# Patient Record
Sex: FEMALE | Race: WHITE | NOT HISPANIC OR LATINO | URBAN - METROPOLITAN AREA
[De-identification: names, ages, dates, MRNs, and addresses within clinical notes are randomized per-mention and may not be internally consistent; named-entity substitution may affect disease eponyms.]

---

## 2019-02-05 VITALS
TEMPERATURE: 98 F | DIASTOLIC BLOOD PRESSURE: 79 MMHG | RESPIRATION RATE: 20 BRPM | WEIGHT: 149.91 LBS | HEART RATE: 94 BPM | OXYGEN SATURATION: 95 % | SYSTOLIC BLOOD PRESSURE: 120 MMHG

## 2019-02-05 RX ORDER — SODIUM CHLORIDE 9 MG/ML
1000 INJECTION INTRAMUSCULAR; INTRAVENOUS; SUBCUTANEOUS ONCE
Qty: 0 | Refills: 0 | Status: COMPLETED | OUTPATIENT
Start: 2019-02-05 | End: 2019-02-05

## 2019-02-05 NOTE — ED PROVIDER NOTE - PROGRESS NOTE DETAILS
Pt perked up with IVfs, now talking and more strength noted on exam. CT head neg for ICH. UA with +infection. CXR with no obvious infiltrate. Pt was covered with broad spectrum abx.  She denies CP/SOB, no abdominal pain. Per daughter, patient has no complaints at this time. She is resting comfortably in bed. No resp distress.

## 2019-02-05 NOTE — ED ADULT TRIAGE NOTE - CHIEF COMPLAINT QUOTE
pt BIBA from [facility] for eval of abnormal labs drawn on 2/4/19 noted WBC's 16 and keppra level 8. Per nursing home staff pt has been more lethargic refusing food and medications x 24hrs
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
## 2019-02-05 NOTE — ED PROVIDER NOTE - MEDICAL DECISION MAKING DETAILS
Pt febrile with ams and abnormal labs, very weak and dehydrated appearing on exam, decreased strength throughout, septic workup initiated, will also repeat CT head given recent trauma to r/o ICH. Pt will require admission

## 2019-02-05 NOTE — ED PROVIDER NOTE - PHYSICAL EXAMINATION
GEN: Elderly, febrile, awake, alert, responsive to voice, appears very weak.  ENT: Airway patent, Nasal mucosa clear. Mouth with dry mucosa.  EYES: Clear bilaterally. perrl, eomi  RESPIRATORY: Breathing comfortably with normal RR. No hypoxia, no resp distress.  CARDIAC: Regular rate and rhythm  ABDOMEN: Soft, nontender, +bowel sounds, no rebound, rigidity, or guarding.  MSK: Range of motion is not limited, no deformities noted.  NEURO: Awake and responsive to voice, able to follow some commands, overall strength decreased, but able to move hands and feet on command.   SKIN: Skin normal color for race, warm, dry and intact. Resolving ecchymosis to right forehead. GEN: Elderly, febrile, awake, alert, responsive to voice, appears very weak.  ENT: Airway patent, Nasal mucosa clear. Mouth with dry mucosa.  EYES: Clear bilaterally. perrl, eomi  RESPIRATORY: Breathing comfortably with normal RR. No hypoxia, no resp distress.  CARDIAC: Regular rate and rhythm  ABDOMEN: Soft, nontender, +bowel sounds, no rebound, rigidity, or guarding.  MSK: Range of motion is not limited, no deformities noted. No pain with ROM of extremities, no midline c-spine TTP, step offs or deformities.   NEURO: Awake and responsive to voice, able to follow some commands, overall strength decreased, but able to move hands and feet on command.    SKIN: Skin normal color for race, warm, dry and intact. Resolving ecchymosis to right forehead.

## 2019-02-05 NOTE — ED PROVIDER NOTE - CARE PLAN
Principal Discharge DX:	Fever Principal Discharge DX:	Fever  Secondary Diagnosis:	UTI (urinary tract infection)  Secondary Diagnosis:	Dehydration

## 2019-02-06 ENCOUNTER — INPATIENT (INPATIENT)
Facility: HOSPITAL | Age: 74
LOS: 4 days | Discharge: NURSING, REHAB OR LONG-TERM CARE | DRG: 872 | End: 2019-02-11
Attending: INTERNAL MEDICINE | Admitting: INTERNAL MEDICINE
Payer: MEDICARE

## 2019-02-06 DIAGNOSIS — N17.9 ACUTE KIDNEY FAILURE, UNSPECIFIED: ICD-10-CM

## 2019-02-06 DIAGNOSIS — I10 ESSENTIAL (PRIMARY) HYPERTENSION: ICD-10-CM

## 2019-02-06 DIAGNOSIS — Z29.9 ENCOUNTER FOR PROPHYLACTIC MEASURES, UNSPECIFIED: ICD-10-CM

## 2019-02-06 DIAGNOSIS — E78.5 HYPERLIPIDEMIA, UNSPECIFIED: ICD-10-CM

## 2019-02-06 DIAGNOSIS — I25.10 ATHEROSCLEROTIC HEART DISEASE OF NATIVE CORONARY ARTERY WITHOUT ANGINA PECTORIS: ICD-10-CM

## 2019-02-06 DIAGNOSIS — Z91.89 OTHER SPECIFIED PERSONAL RISK FACTORS, NOT ELSEWHERE CLASSIFIED: ICD-10-CM

## 2019-02-06 DIAGNOSIS — N39.0 URINARY TRACT INFECTION, SITE NOT SPECIFIED: ICD-10-CM

## 2019-02-06 DIAGNOSIS — R56.9 UNSPECIFIED CONVULSIONS: ICD-10-CM

## 2019-02-06 DIAGNOSIS — A41.9 SEPSIS, UNSPECIFIED ORGANISM: ICD-10-CM

## 2019-02-06 DIAGNOSIS — D64.9 ANEMIA, UNSPECIFIED: ICD-10-CM

## 2019-02-06 DIAGNOSIS — Z98.890 OTHER SPECIFIED POSTPROCEDURAL STATES: Chronic | ICD-10-CM

## 2019-02-06 DIAGNOSIS — Z90.3 ACQUIRED ABSENCE OF STOMACH [PART OF]: Chronic | ICD-10-CM

## 2019-02-06 LAB
ANION GAP SERPL CALC-SCNC: 12 MMOL/L — SIGNIFICANT CHANGE UP (ref 5–17)
BLD GP AB SCN SERPL QL: NEGATIVE — SIGNIFICANT CHANGE UP
BUN SERPL-MCNC: 25 MG/DL — HIGH (ref 7–23)
CALCIUM SERPL-MCNC: 9.1 MG/DL — SIGNIFICANT CHANGE UP (ref 8.4–10.5)
CHLORIDE SERPL-SCNC: 106 MMOL/L — SIGNIFICANT CHANGE UP (ref 96–108)
CO2 SERPL-SCNC: 22 MMOL/L — SIGNIFICANT CHANGE UP (ref 22–31)
CREAT SERPL-MCNC: 1.54 MG/DL — HIGH (ref 0.5–1.3)
FERRITIN SERPL-MCNC: 292 NG/ML — HIGH (ref 15–150)
GLUCOSE SERPL-MCNC: 110 MG/DL — HIGH (ref 70–99)
HCT VFR BLD CALC: 23.7 % — LOW (ref 34.5–45)
HGB BLD-MCNC: 7.2 G/DL — LOW (ref 11.5–15.5)
IRON SATN MFR SERPL: 13 UG/DL — LOW (ref 30–160)
IRON SATN MFR SERPL: 6 % — LOW (ref 14–50)
MAGNESIUM SERPL-MCNC: 2.1 MG/DL — SIGNIFICANT CHANGE UP (ref 1.6–2.6)
MCHC RBC-ENTMCNC: 30 PG — SIGNIFICANT CHANGE UP (ref 27–34)
MCHC RBC-ENTMCNC: 30.4 GM/DL — LOW (ref 32–36)
MCV RBC AUTO: 98.8 FL — SIGNIFICANT CHANGE UP (ref 80–100)
NRBC # BLD: 0 /100 WBCS — SIGNIFICANT CHANGE UP (ref 0–0)
PLATELET # BLD AUTO: 439 K/UL — HIGH (ref 150–400)
POTASSIUM SERPL-MCNC: 4.3 MMOL/L — SIGNIFICANT CHANGE UP (ref 3.5–5.3)
POTASSIUM SERPL-SCNC: 4.3 MMOL/L — SIGNIFICANT CHANGE UP (ref 3.5–5.3)
RAPID RVP RESULT: SIGNIFICANT CHANGE UP
RBC # BLD: 2.4 M/UL — LOW (ref 3.8–5.2)
RBC # FLD: 15.4 % — HIGH (ref 10.3–14.5)
RH IG SCN BLD-IMP: POSITIVE — SIGNIFICANT CHANGE UP
RH IG SCN BLD-IMP: POSITIVE — SIGNIFICANT CHANGE UP
SODIUM SERPL-SCNC: 140 MMOL/L — SIGNIFICANT CHANGE UP (ref 135–145)
TIBC SERPL-MCNC: 215 UG/DL — LOW (ref 220–430)
TRANSFERRIN SERPL-MCNC: 177 MG/DL — LOW (ref 200–360)
UIBC SERPL-MCNC: 202 UG/DL — SIGNIFICANT CHANGE UP (ref 110–370)
WBC # BLD: 12.65 K/UL — HIGH (ref 3.8–10.5)
WBC # FLD AUTO: 12.65 K/UL — HIGH (ref 3.8–10.5)

## 2019-02-06 PROCEDURE — 93010 ELECTROCARDIOGRAM REPORT: CPT

## 2019-02-06 PROCEDURE — 70450 CT HEAD/BRAIN W/O DYE: CPT | Mod: 26

## 2019-02-06 PROCEDURE — 71045 X-RAY EXAM CHEST 1 VIEW: CPT | Mod: 26

## 2019-02-06 PROCEDURE — 99285 EMERGENCY DEPT VISIT HI MDM: CPT | Mod: 25

## 2019-02-06 RX ORDER — LEVETIRACETAM 250 MG/1
1000 TABLET, FILM COATED ORAL ONCE
Qty: 0 | Refills: 0 | Status: COMPLETED | OUTPATIENT
Start: 2019-02-06 | End: 2019-02-06

## 2019-02-06 RX ORDER — PANTOPRAZOLE SODIUM 20 MG/1
40 TABLET, DELAYED RELEASE ORAL
Qty: 0 | Refills: 0 | Status: DISCONTINUED | OUTPATIENT
Start: 2019-02-06 | End: 2019-02-11

## 2019-02-06 RX ORDER — SODIUM CHLORIDE 9 MG/ML
1000 INJECTION INTRAMUSCULAR; INTRAVENOUS; SUBCUTANEOUS ONCE
Qty: 0 | Refills: 0 | Status: COMPLETED | OUTPATIENT
Start: 2019-02-06 | End: 2019-02-06

## 2019-02-06 RX ORDER — LACOSAMIDE 50 MG/1
200 TABLET ORAL
Qty: 0 | Refills: 0 | Status: DISCONTINUED | OUTPATIENT
Start: 2019-02-06 | End: 2019-02-11

## 2019-02-06 RX ORDER — PIPERACILLIN AND TAZOBACTAM 4; .5 G/20ML; G/20ML
3.38 INJECTION, POWDER, LYOPHILIZED, FOR SOLUTION INTRAVENOUS ONCE
Qty: 0 | Refills: 0 | Status: COMPLETED | OUTPATIENT
Start: 2019-02-06 | End: 2019-02-06

## 2019-02-06 RX ORDER — ATORVASTATIN CALCIUM 80 MG/1
10 TABLET, FILM COATED ORAL AT BEDTIME
Qty: 0 | Refills: 0 | Status: DISCONTINUED | OUTPATIENT
Start: 2019-02-06 | End: 2019-02-11

## 2019-02-06 RX ORDER — LEVETIRACETAM 250 MG/1
500 TABLET, FILM COATED ORAL
Qty: 0 | Refills: 0 | Status: DISCONTINUED | OUTPATIENT
Start: 2019-02-06 | End: 2019-02-11

## 2019-02-06 RX ORDER — HALOPERIDOL DECANOATE 100 MG/ML
1 INJECTION INTRAMUSCULAR ONCE
Qty: 0 | Refills: 0 | Status: COMPLETED | OUTPATIENT
Start: 2019-02-06 | End: 2019-02-06

## 2019-02-06 RX ORDER — FERROUS SULFATE 325(65) MG
325 TABLET ORAL DAILY
Qty: 0 | Refills: 0 | Status: DISCONTINUED | OUTPATIENT
Start: 2019-02-06 | End: 2019-02-11

## 2019-02-06 RX ORDER — MEMANTINE HYDROCHLORIDE 10 MG/1
10 TABLET ORAL DAILY
Qty: 0 | Refills: 0 | Status: DISCONTINUED | OUTPATIENT
Start: 2019-02-06 | End: 2019-02-11

## 2019-02-06 RX ORDER — ACETAMINOPHEN 500 MG
1000 TABLET ORAL ONCE
Qty: 0 | Refills: 0 | Status: COMPLETED | OUTPATIENT
Start: 2019-02-06 | End: 2019-02-06

## 2019-02-06 RX ORDER — VANCOMYCIN HCL 1 G
1000 VIAL (EA) INTRAVENOUS ONCE
Qty: 0 | Refills: 0 | Status: COMPLETED | OUTPATIENT
Start: 2019-02-06 | End: 2019-02-06

## 2019-02-06 RX ORDER — DONEPEZIL HYDROCHLORIDE 10 MG/1
5 TABLET, FILM COATED ORAL AT BEDTIME
Qty: 0 | Refills: 0 | Status: DISCONTINUED | OUTPATIENT
Start: 2019-02-06 | End: 2019-02-11

## 2019-02-06 RX ORDER — ASPIRIN/CALCIUM CARB/MAGNESIUM 324 MG
81 TABLET ORAL DAILY
Qty: 0 | Refills: 0 | Status: DISCONTINUED | OUTPATIENT
Start: 2019-02-06 | End: 2019-02-11

## 2019-02-06 RX ORDER — CEFTRIAXONE 500 MG/1
1 INJECTION, POWDER, FOR SOLUTION INTRAMUSCULAR; INTRAVENOUS EVERY 24 HOURS
Qty: 0 | Refills: 0 | Status: DISCONTINUED | OUTPATIENT
Start: 2019-02-06 | End: 2019-02-10

## 2019-02-06 RX ADMIN — LEVETIRACETAM 500 MILLIGRAM(S): 250 TABLET, FILM COATED ORAL at 21:50

## 2019-02-06 RX ADMIN — LACOSAMIDE 200 MILLIGRAM(S): 50 TABLET ORAL at 06:58

## 2019-02-06 RX ADMIN — PIPERACILLIN AND TAZOBACTAM 200 GRAM(S): 4; .5 INJECTION, POWDER, LYOPHILIZED, FOR SOLUTION INTRAVENOUS at 00:45

## 2019-02-06 RX ADMIN — LACOSAMIDE 200 MILLIGRAM(S): 50 TABLET ORAL at 18:48

## 2019-02-06 RX ADMIN — LEVETIRACETAM 400 MILLIGRAM(S): 250 TABLET, FILM COATED ORAL at 01:15

## 2019-02-06 RX ADMIN — DONEPEZIL HYDROCHLORIDE 5 MILLIGRAM(S): 10 TABLET, FILM COATED ORAL at 21:50

## 2019-02-06 RX ADMIN — MEMANTINE HYDROCHLORIDE 10 MILLIGRAM(S): 10 TABLET ORAL at 13:00

## 2019-02-06 RX ADMIN — PANTOPRAZOLE SODIUM 40 MILLIGRAM(S): 20 TABLET, DELAYED RELEASE ORAL at 06:58

## 2019-02-06 RX ADMIN — HALOPERIDOL DECANOATE 1 MILLIGRAM(S): 100 INJECTION INTRAMUSCULAR at 05:31

## 2019-02-06 RX ADMIN — SODIUM CHLORIDE 2000 MILLILITER(S): 9 INJECTION INTRAMUSCULAR; INTRAVENOUS; SUBCUTANEOUS at 01:31

## 2019-02-06 RX ADMIN — CEFTRIAXONE 100 GRAM(S): 500 INJECTION, POWDER, FOR SOLUTION INTRAMUSCULAR; INTRAVENOUS at 12:59

## 2019-02-06 RX ADMIN — Medication 250 MILLIGRAM(S): at 01:31

## 2019-02-06 RX ADMIN — LEVETIRACETAM 500 MILLIGRAM(S): 250 TABLET, FILM COATED ORAL at 13:01

## 2019-02-06 RX ADMIN — SODIUM CHLORIDE 1000 MILLILITER(S): 9 INJECTION INTRAMUSCULAR; INTRAVENOUS; SUBCUTANEOUS at 00:13

## 2019-02-06 RX ADMIN — ATORVASTATIN CALCIUM 10 MILLIGRAM(S): 80 TABLET, FILM COATED ORAL at 21:50

## 2019-02-06 RX ADMIN — Medication 325 MILLIGRAM(S): at 13:00

## 2019-02-06 RX ADMIN — Medication 81 MILLIGRAM(S): at 13:00

## 2019-02-06 RX ADMIN — Medication 400 MILLIGRAM(S): at 00:20

## 2019-02-06 NOTE — H&P ADULT - ASSESSMENT
74 y/o F PMHx CAD s/p stents (>10 years ago), HTN, HLD, anemia, seizure disorder on Keppra, dementia (waxes and wanes) presenting from [facility] with increased weakness, lethargy for the last few days, admitted for sepsis 2/2 UTI.

## 2019-02-06 NOTE — H&P ADULT - NSHPPHYSICALEXAM_GEN_ALL_CORE
.  VITAL SIGNS:  T(C): 36.8 (02-06-19 @ 02:11), Max: 38.4 (02-06-19 @ 00:00)  T(F): 98.3 (02-06-19 @ 02:11), Max: 101.2 (02-06-19 @ 00:00)  HR: 80 (02-06-19 @ 02:11) (80 - 94)  BP: 95/- (02-06-19 @ 02:11) (95/- - 120/79)  BP(mean): --  RR: 20 (02-05-19 @ 23:38) (20 - 20)  SpO2: 95% (02-05-19 @ 23:38) (95% - 95%)  Wt(kg): --    PHYSICAL EXAM:    Constitutional: WDWN resting comfortably in bed; NAD  Head: NC/AT  Eyes: PERRL, EOMI, anicteric sclera  ENT: no nasal discharge; uvula midline, no oropharyngeal erythema or exudates; MMM  Neck: supple; no JVD or thyromegaly  Respiratory: CTA B/L; no W/R/R, no retractions  Cardiac: +S1/S2; RRR; no M/R/G; PMI non-displaced  Gastrointestinal: abdomen soft, NT/ND; no rebound or guarding; +BSx4  Genitourinary: normal external genitalia  Back: spine midline, no bony tenderness or step-offs; no CVAT B/L  Extremities: WWP, no clubbing or cyanosis; no peripheral edema  Musculoskeletal: NROM x4; no joint swelling, tenderness or erythema  Vascular: 2+ radial, femoral, DP/PT pulses B/L  Dermatologic: skin warm, dry and intact; no rashes, wounds, or scars  Lymphatic: no submandibular or cervical LAD  Neurologic: AAOx3; CNII-XII grossly intact; no focal deficits  Psychiatric: affect and characteristics of appearance, verbalizations, behaviors are appropriate .  VITAL SIGNS:  T(C): 36.8 (02-06-19 @ 02:11), Max: 38.4 (02-06-19 @ 00:00)  T(F): 98.3 (02-06-19 @ 02:11), Max: 101.2 (02-06-19 @ 00:00)  HR: 80 (02-06-19 @ 02:11) (80 - 94)  BP: 95/- (02-06-19 @ 02:11) (95/- - 120/79)  BP(mean): --  RR: 20 (02-05-19 @ 23:38) (20 - 20)  SpO2: 95% (02-05-19 @ 23:38) (95% - 95%)  Wt(kg): --    PHYSICAL EXAM:    Constitutional: WDWN female, resting comfortably in bed; NAD, uncooperative  Head: NC/AT  Eyes: PERRL, EOMI, anicteric sclera  ENT: no nasal discharge; uvula midline, no oropharyngeal erythema or exudates; MMM  Neck: supple; no JVD or thyromegaly  Respiratory: refusing exam, combative  Cardiac: refusing exam, combative  Gastrointestinal: abdomen soft, NT/ND; no rebound or guarding;  Genitourinary: normal external genitalia  Back: spine midline, no bony tenderness or step-offs; no CVAT B/L  Extremities: WWP, no clubbing or cyanosis; no peripheral edema  Musculoskeletal: NROM x4; no joint swelling, tenderness or erythema  Vascular: 2+ radial, femoral, DP/PT pulses B/L  Dermatologic: skin warm, dry and intact; no rashes, wounds, or scars  Lymphatic: no submandibular or cervical LAD  Neurologic: speaking in [place], incoherent, unable to assess orientation

## 2019-02-06 NOTE — H&P ADULT - NSHPLABSRESULTS_GEN_ALL_CORE
.  LABS:                         7.7    14.07 )-----------( 530      ( 2019 00:03 )             25.4     02-    142  |  106  |  26<H>  ----------------------------<  120<H>  4.7   |  24  |  1.68<H>    Ca    9.5      2019 00:03  Mg     2.2         TPro  7.3  /  Alb  3.2<L>  /  TBili  0.3  /  DBili  x   /  AST  36  /  ALT  39  /  AlkPhos  103  02-      Urinalysis Basic - ( 2019 00:14 )    Color: Yellow / Appearance: Clear / S.015 / pH: x  Gluc: x / Ketone: NEGATIVE  / Bili: Negative / Urobili: 0.2 E.U./dL   Blood: x / Protein: 30 mg/dL / Nitrite: NEGATIVE   Leuk Esterase: Trace / RBC: < 5 /HPF / WBC 5-10 /HPF   Sq Epi: x / Non Sq Epi: 0-5 /HPF / Bacteria: Present /HPF            Lactate, Blood: 0.9 mmoL/L ( @ 00:02)      RADIOLOGY, EKG & ADDITIONAL TESTS: Reviewed.

## 2019-02-06 NOTE — H&P ADULT - PROBLEM SELECTOR PLAN 9
F: s/p 2 L NC   E: Replete electrolytes PRN, Goal: K>4, Mg>2  N: Dysphagia screen, then can resume DASH/TLC diet    DVT Ppx: SCDs  CODE: DNR (not DNI), MOLST in patient chart  Dispo: Admit to [facility].

## 2019-02-06 NOTE — ED ADULT NURSE REASSESSMENT NOTE - NS ED NURSE REASSESS COMMENT FT1
Patient started exhibiting agitation while attempting to complete rpt ekg. Medicine team notified. Patient awaiting to go to unit. Awaiting disposition

## 2019-02-06 NOTE — PATIENT PROFILE ADULT - NSPRONUTRITIONRISK_GEN_A_NUR
Order signed.  Sorry, I thought he had the PRESCRIPTION and just needed an okay to fill early.    Jackeline Rachel M.D.     No indicators present

## 2019-02-06 NOTE — H&P ADULT - PMH
Anemia    CAD (coronary artery disease)    Dementia    HLD (hyperlipidemia)    HTN (hypertension)    Seizure

## 2019-02-06 NOTE — H&P ADULT - PROBLEM SELECTOR PLAN 4
Patient with history of seizures, most recent episode approx 2 weeks ago with associated fall resulting in hospitalization at [facility]. On Keppra and Vimpat outpatient. Keppra level noted to be low on outpatient bloodwork at [facility] rehab.   -f/u AM Keppra level  -c/w home medications, Keppra and Vimpat.

## 2019-02-06 NOTE — H&P ADULT - PROBLEM SELECTOR PLAN 10
Transition of care performed with sharing of clinical summary. Plan; 1) PCP Contacted on Admission: (Y/N) --> Name & Phone #: Dr Zurdo Ruvalcaba (confirm phone number with patient daughter in AM. Daughter - Lorrie Schmitt Ph: [phone])  2) Date of Contact with PCP:  3) PCP Contacted at Discharge: (Y/N)  4) Summary of Handoff Given to PCP:   5) Post-Discharge Appointment Date and Location:

## 2019-02-06 NOTE — ED ADULT NURSE NOTE - NS ED NURSE REPORT GIVEN DT
VITAMIN B COMPLEX      Last Written Prescription Date: 9/15/16  Last Fill Quantity: 30,  # refills: 1   Last Office Visit with [facility], [facility] or [facility] prescribing provider: 11/22/16                                         Next 5 appointments (look out 90 days)     Jan 17, 2017  9:45 AM   [facility] with [place] [facility]    [address] [facility] [address]   [phone]                  FERROUS SULFATE        Last Written Prescription Date: 11/9/16  Last Fill Quantity: 60,    # refills: 1  Last Office Visit with [facility], [facility] or [facility] prescribing provider:  11/22/16   Next 5 appointments (look out 90 days)     Jan 17, 2017  9:45 AM   Nurse Only with [place] PI CMA/LPN   [facility]    [address] [facility] [address]   [phone]                   WBC     12.3   1/3/2017  RBC     3.79   1/3/2017  HGB     12.4   1/3/2017  HCT     37.8   1/3/2017  No components found with this name: mct  MCV      100   1/3/2017  MCH     32.7   1/3/2017  MCHC     32.8   1/3/2017  RDW     14.1   1/3/2017  PLT      412   1/3/2017  AST       27   9/25/2013  ALT       32   9/25/2013  CREATININE   Date Value Ref Range Status   05/10/2016 0.88 0.52 - 1.04 mg/dL Final            06-Feb-2019 03:24

## 2019-02-06 NOTE — ED ADULT NURSE NOTE - NSIMPLEMENTINTERV_GEN_ALL_ED
Implemented All Fall Risk Interventions:  [place] to call system. Call bell, personal items and telephone within reach. Instruct patient to call for assistance. Room bathroom lighting operational. Non-slip footwear when patient is off stretcher. Physically safe environment: no spills, clutter or unnecessary equipment. Stretcher in lowest position, wheels locked, appropriate side rails in place. Provide visual cue, wrist band, yellow gown, etc. Monitor gait and stability. Monitor for mental status changes and reorient to person, place, and time. Review medications for side effects contributing to fall risk. Reinforce activity limits and safety measures with patient and family.

## 2019-02-06 NOTE — ED ADULT NURSE NOTE - NSINTERVENTIONOPT_GEN_ALL_ED
Enhanced Supervision/Chair Alarms/Move Toilet (commode/urinal) to patient using "arms reach"/Stretcher Alarms/[place]our[place] Rounding

## 2019-02-06 NOTE — H&P ADULT - PROBLEM SELECTOR PLAN 3
Patient with ALE on admission, 1.68 (unknown baseline), bloodwork prior to admission at [facility] rehab 1.37 (2/5/19), likely pre-renal in the setting of sepsis  -f/u urine lytes and calculate FeNa  -continue to trend sCr.

## 2019-02-06 NOTE — H&P ADULT - PROBLEM SELECTOR PLAN 6
Patient with history of HTN, on Lisinopril 5 mg daily and Lopressor 25 mg BID  -hold home meds in the setting of sepsis.

## 2019-02-06 NOTE — H&P ADULT - PROBLEM SELECTOR PLAN 1
Patient presenting with symptoms of increased lethargy, altered mental status, meeting SEPSIS criteria (febrile to 101.2 F, tachycardic to 96, Leukocytosis 14.07), negative lactate. CXR with no obvious infiltrates, UA + on admission, s/p Vanc/Zosyn in ED  -UA +, f/u Ucx  -f/u Bcx  -s/p Vanc/Zosyn in ED x1, c/w Ceftriaxone 1 g daily  -f/u official read of CXR.

## 2019-02-06 NOTE — H&P ADULT - HISTORY OF PRESENT ILLNESS
74 y/o F PMHx CAD s/p stents (>10 years ago), HTN, HLD, anemia, seizure disorder on Keppra, dementia (waxes and wanes) presenting from [facility] with increased weakness, lethargy for the last few days.    ED VS: T 97.5-101.2F; HR 80-94; BP /79; RR 20; Sp02 95 RA  ED Course: Leukocytosis 14.07, Hgb 7.7 (normocytic), thrombocytosis 530, lactate 0.9, sCr 1.68 (unknown baseline), CT head negative, UA +, RVP negative, s/p 2L NS, s/p Vanc/Zosyn 74 y/o F PMHx CAD s/p stents (>10 years ago), HTN, HLD, anemia, seizure disorder on Keppra, dementia (waxes and wanes) presenting from [facility] rehab with increased weakness, lethargy for the last few days. Of note, patient with recent admission to [facility] for 2 weeks (discharged on Friday 2/1). Patient was admitted then for seizure/fall with course c/b UTI. Patient discharged on Cefpodoxime. Since her discharge to [facility], patient noted to become more lethargic and spiked a temperature of 101F. Patient otherwise with no symptoms of chest pain, shortness of breath, cough, nausea/vomiting, diarrhea, changes in urinary habits. Remainder of ROS negative.     ED VS: T 97.5-101.2F; HR 80-94; BP /79; RR 20; Sp02 95 RA  ED Course: Leukocytosis 14.07, Hgb 7.7 (normocytic), thrombocytosis 530, lactate 0.9, sCr 1.68 (unknown baseline), CT head negative, UA +, RVP negative, CXR performed (pending official read), preliminary read with no obvious infiltrates, s/p 2L NS, s/p Vanc/Zosyn
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
## 2019-02-06 NOTE — H&P ADULT - PROBLEM SELECTOR PLAN 7
Patient with history of CAD with multiple stents, last placed >10 years ago, on ASA 81  -c/w ASA 81 as no signs of active bleeding on exam.

## 2019-02-06 NOTE — ED ADULT NURSE NOTE - CHIEF COMPLAINT QUOTE
pt BIBA from [facility] for eval of abnormal labs drawn on 2/4/19 noted WBC's 16 and keppra level 8. Per nursing home staff pt has been more lethargic refusing food and medications x 24hrs

## 2019-02-07 LAB
ANION GAP SERPL CALC-SCNC: 14 MMOL/L — SIGNIFICANT CHANGE UP (ref 5–17)
BASOPHILS # BLD AUTO: 0.07 K/UL — SIGNIFICANT CHANGE UP (ref 0–0.2)
BASOPHILS NFR BLD AUTO: 0.5 % — SIGNIFICANT CHANGE UP (ref 0–2)
BUN SERPL-MCNC: 19 MG/DL — SIGNIFICANT CHANGE UP (ref 7–23)
CALCIUM SERPL-MCNC: 9.6 MG/DL — SIGNIFICANT CHANGE UP (ref 8.4–10.5)
CHLORIDE SERPL-SCNC: 106 MMOL/L — SIGNIFICANT CHANGE UP (ref 96–108)
CO2 SERPL-SCNC: 21 MMOL/L — LOW (ref 22–31)
CREAT SERPL-MCNC: 1.38 MG/DL — HIGH (ref 0.5–1.3)
EOSINOPHIL # BLD AUTO: 0.16 K/UL — SIGNIFICANT CHANGE UP (ref 0–0.5)
EOSINOPHIL NFR BLD AUTO: 1.2 % — SIGNIFICANT CHANGE UP (ref 0–6)
GLUCOSE SERPL-MCNC: 116 MG/DL — HIGH (ref 70–99)
HCT VFR BLD CALC: 28.8 % — LOW (ref 34.5–45)
HGB BLD-MCNC: 8.8 G/DL — LOW (ref 11.5–15.5)
IMM GRANULOCYTES NFR BLD AUTO: 0.7 % — SIGNIFICANT CHANGE UP (ref 0–1.5)
LYMPHOCYTES # BLD AUTO: 1.1 K/UL — SIGNIFICANT CHANGE UP (ref 1–3.3)
LYMPHOCYTES # BLD AUTO: 8.2 % — LOW (ref 13–44)
MAGNESIUM SERPL-MCNC: 2 MG/DL — SIGNIFICANT CHANGE UP (ref 1.6–2.6)
MCHC RBC-ENTMCNC: 29.5 PG — SIGNIFICANT CHANGE UP (ref 27–34)
MCHC RBC-ENTMCNC: 30.6 GM/DL — LOW (ref 32–36)
MCV RBC AUTO: 96.6 FL — SIGNIFICANT CHANGE UP (ref 80–100)
MONOCYTES # BLD AUTO: 0.76 K/UL — SIGNIFICANT CHANGE UP (ref 0–0.9)
MONOCYTES NFR BLD AUTO: 5.7 % — SIGNIFICANT CHANGE UP (ref 2–14)
NEUTROPHILS # BLD AUTO: 11.2 K/UL — HIGH (ref 1.8–7.4)
NEUTROPHILS NFR BLD AUTO: 83.7 % — HIGH (ref 43–77)
PLATELET # BLD AUTO: 466 K/UL — HIGH (ref 150–400)
POTASSIUM SERPL-MCNC: 4.2 MMOL/L — SIGNIFICANT CHANGE UP (ref 3.5–5.3)
POTASSIUM SERPL-SCNC: 4.2 MMOL/L — SIGNIFICANT CHANGE UP (ref 3.5–5.3)
RBC # BLD: 2.98 M/UL — LOW (ref 3.8–5.2)
RBC # FLD: 16.3 % — HIGH (ref 10.3–14.5)
SODIUM SERPL-SCNC: 141 MMOL/L — SIGNIFICANT CHANGE UP (ref 135–145)
WBC # BLD: 13.39 K/UL — HIGH (ref 3.8–10.5)
WBC # FLD AUTO: 13.39 K/UL — HIGH (ref 3.8–10.5)

## 2019-02-07 PROCEDURE — 99222 1ST HOSP IP/OBS MODERATE 55: CPT

## 2019-02-07 RX ORDER — HALOPERIDOL DECANOATE 100 MG/ML
0.5 INJECTION INTRAMUSCULAR ONCE
Qty: 0 | Refills: 0 | Status: COMPLETED | OUTPATIENT
Start: 2019-02-07 | End: 2019-02-07

## 2019-02-07 RX ORDER — QUETIAPINE FUMARATE 200 MG/1
12.5 TABLET, FILM COATED ORAL DAILY
Qty: 0 | Refills: 0 | Status: DISCONTINUED | OUTPATIENT
Start: 2019-02-07 | End: 2019-02-11

## 2019-02-07 RX ORDER — SODIUM CHLORIDE 9 MG/ML
1000 INJECTION INTRAMUSCULAR; INTRAVENOUS; SUBCUTANEOUS
Qty: 0 | Refills: 0 | Status: DISCONTINUED | OUTPATIENT
Start: 2019-02-07 | End: 2019-02-11

## 2019-02-07 RX ADMIN — HALOPERIDOL DECANOATE 0.5 MILLIGRAM(S): 100 INJECTION INTRAMUSCULAR at 23:42

## 2019-02-07 RX ADMIN — LEVETIRACETAM 500 MILLIGRAM(S): 250 TABLET, FILM COATED ORAL at 21:50

## 2019-02-07 RX ADMIN — PANTOPRAZOLE SODIUM 40 MILLIGRAM(S): 20 TABLET, DELAYED RELEASE ORAL at 06:42

## 2019-02-07 RX ADMIN — Medication 81 MILLIGRAM(S): at 12:04

## 2019-02-07 RX ADMIN — SODIUM CHLORIDE 80 MILLILITER(S): 9 INJECTION INTRAMUSCULAR; INTRAVENOUS; SUBCUTANEOUS at 18:35

## 2019-02-07 RX ADMIN — CEFTRIAXONE 100 GRAM(S): 500 INJECTION, POWDER, FOR SOLUTION INTRAMUSCULAR; INTRAVENOUS at 12:04

## 2019-02-07 RX ADMIN — SODIUM CHLORIDE 80 MILLILITER(S): 9 INJECTION INTRAMUSCULAR; INTRAVENOUS; SUBCUTANEOUS at 21:50

## 2019-02-07 RX ADMIN — LACOSAMIDE 200 MILLIGRAM(S): 50 TABLET ORAL at 06:42

## 2019-02-07 RX ADMIN — MEMANTINE HYDROCHLORIDE 10 MILLIGRAM(S): 10 TABLET ORAL at 12:04

## 2019-02-07 RX ADMIN — QUETIAPINE FUMARATE 12.5 MILLIGRAM(S): 200 TABLET, FILM COATED ORAL at 21:50

## 2019-02-07 RX ADMIN — ATORVASTATIN CALCIUM 10 MILLIGRAM(S): 80 TABLET, FILM COATED ORAL at 21:50

## 2019-02-07 RX ADMIN — Medication 325 MILLIGRAM(S): at 12:04

## 2019-02-07 RX ADMIN — LEVETIRACETAM 500 MILLIGRAM(S): 250 TABLET, FILM COATED ORAL at 09:17

## 2019-02-07 RX ADMIN — DONEPEZIL HYDROCHLORIDE 5 MILLIGRAM(S): 10 TABLET, FILM COATED ORAL at 21:49

## 2019-02-07 RX ADMIN — LACOSAMIDE 200 MILLIGRAM(S): 50 TABLET ORAL at 18:33

## 2019-02-07 NOTE — PROGRESS NOTE ADULT - SUBJECTIVE AND OBJECTIVE BOX
OVERNIGHT EVENTS: JERO    SUBJECTIVE: No new issues, afebrile    Vital Signs Last 12 Hrs  T(F): 98.5 (19 @ 08:50), Max: 98.5 (19 @ 08:50)  HR: 83 (19 @ 08:50) (83 - 83)  BP: 111/73 (19 @ 08:50) (111/73 - 147/80)  RR: 16 (19 @ 08:50) (16 - 18)  SpO2: 98% (19 @ 08:50) (98% - 98%)  I&O's Summary      PHYSICAL EXAM:  Constitutional: NAD, comfortable in bed.  HEENT: NC/AT, PERRLA, EOMI, no conjunctival pallor or scleral icterus, MMM  Neck: Supple, no JVD  Respiratory: Normal rate, rhythm, depth, effort. CTAB. No w/r/r.   Cardiovascular: RRR, normal S1 and S2, no m/r/g.   Gastrointestinal: +BS, soft NTND, no guarding or rebound tenderness, no palpable masses   Extremities: wwp; no cyanosis, clubbing or edema.   Vascular: Pulses equal and strong throughout.   Neurological: AAOx3, no CN deficits, strength and sensation intact throughout.   Skin: No gross skin abnormalities or rashes        LABS:                        8.8    13.39 )-----------( 466      ( 2019 06:56 )             28.8     02-07    141  |  106  |  19  ----------------------------<  116<H>  4.2   |  21<L>  |  1.38<H>    Ca    9.6      2019 06:56  Mg     2.0     -    TPro  7.3  /  Alb  3.2<L>  /  TBili  0.3  /  DBili  x   /  AST  36  /  ALT  39  /  AlkPhos  103  02-      Urinalysis Basic - ( 2019 00:14 )    Color: Yellow / Appearance: Clear / S.015 / pH: x  Gluc: x / Ketone: NEGATIVE  / Bili: Negative / Urobili: 0.2 E.U./dL   Blood: x / Protein: 30 mg/dL / Nitrite: NEGATIVE   Leuk Esterase: Trace / RBC: < 5 /HPF / WBC 5-10 /HPF   Sq Epi: x / Non Sq Epi: 0-5 /HPF / Bacteria: Present /HPF        RADIOLOGY & ADDITIONAL TESTS:    MEDICATIONS  (STANDING):  aspirin enteric coated 81 milliGRAM(s) Oral daily  atorvastatin 10 milliGRAM(s) Oral at bedtime  cefTRIAXone   IVPB 1 Gram(s) IV Intermittent every 24 hours  donepezil 5 milliGRAM(s) Oral at bedtime  ferrous    sulfate 325 milliGRAM(s) Oral daily  lacosamide 200 milliGRAM(s) Oral two times a day  levETIRAcetam 500 milliGRAM(s) Oral two times a day  memantine 10 milliGRAM(s) Oral daily  pantoprazole    Tablet 40 milliGRAM(s) Oral before breakfast    MEDICATIONS  (PRN):

## 2019-02-07 NOTE — PROGRESS NOTE ADULT - PROBLEM SELECTOR PLAN 4
Patient with history of seizures, most recent episode approx 2 weeks ago with associated fall resulting in hospitalization at [facility]. On Keppra and Vimpat outpatient. Keppra level noted to be low on outpatient bloodwork at [facility] rehab.   -will f/u Keppra level (it is a send out so takes longer to get back)   -c/w home medications, Keppra and Vimpat. Patient with history of seizures, most recent episode approx 2 weeks ago with associated fall resulting in hospitalization at [facility]. On Keppra and Vimpat outpatient. Keppra level noted to be low on outpatient bloodwork at [email or web address] rehab.   - will f/u Keppra level (it is a send out so takes longer to get back)   - c/w home medications, Keppra and Vimpat

## 2019-02-07 NOTE — PROGRESS NOTE ADULT - PROBLEM SELECTOR PLAN 3
Patient with ALE on admission, 1.68 (unknown baseline), bloodwork prior to admission at [facility] rehab 1.37 (2/5/19), likely pre-renal in the setting of sepsis  -f/u urine lytes and calculate FeNa  -continue to trend sCr.

## 2019-02-07 NOTE — PROGRESS NOTE ADULT - PROBLEM SELECTOR PLAN 3
Patient with ALE on admission, 1.68 (unknown baseline), bloodwork prior to admission at [facility] rehab 1.37 (2/5/19), likely pre-renal in the setting of sepsis  -f/u urine lytes and calculate FeNa  -continue to trend sCr.

## 2019-02-07 NOTE — PROGRESS NOTE ADULT - PROBLEM SELECTOR PLAN 4
Patient with history of seizures, most recent episode approx 2 weeks ago with associated fall resulting in hospitalization at [facility]. On Keppra and Vimpat outpatient. Keppra level noted to be low on outpatient bloodwork at [facility] rehab.   - will f/u Keppra level (it is a send out so takes longer to get back)   - c/w home medications, Keppra and Vimpat

## 2019-02-07 NOTE — PROGRESS NOTE ADULT - SUBJECTIVE AND OBJECTIVE BOX
OVERNIGHT EVENTS: JERO    SUBJECTIVE: Patient reports she is feeling well. She has no chest pain, shortness of breath. No pain with urination. No abdominal pain, nausea or vomiting.     Vital Signs Last 12 Hrs  T(F): 98.5 (19 @ 08:50), Max: 98.5 (19 @ 08:50)  HR: 83 (19 @ 08:50) (83 - 83)  BP: 111/73 (19 @ 08:50) (111/73 - 147/80)  RR: 16 (19 @ 08:50) (16 - 18)  SpO2: 98% (19 @ 08:50) (98% - 98%)  I&O's Summary      PHYSICAL EXAM:  Constitutional: NAD, comfortable in bed.  HEENT: NC/AT, PERRLA, EOMI, no conjunctival pallor or scleral icterus, MMM  Neck: Supple, no JVD  Respiratory: Normal rate, rhythm, depth, effort. CTAB. No w/r/r.   Cardiovascular: RRR, normal S1 and S2, no m/r/g.   Gastrointestinal: +BS, soft NTND, no guarding or rebound tenderness, no palpable masses   Extremities: wwp; no cyanosis, clubbing or edema.   Vascular: Pulses equal and strong throughout.   Neurological: AAOx3, no CN deficits, strength and sensation intact throughout.   Skin: No gross skin abnormalities or rashes        LABS:                        8.8    13.39 )-----------( 466      ( 2019 06:56 )             28.8     -    141  |  106  |  19  ----------------------------<  116<H>  4.2   |  21<L>  |  1.38<H>    Ca    9.6      2019 06:56  Mg     2.0         TPro  7.3  /  Alb  3.2<L>  /  TBili  0.3  /  DBili  x   /  AST  36  /  ALT  39  /  AlkPhos  103        Urinalysis Basic - ( 2019 00:14 )    Color: Yellow / Appearance: Clear / S.015 / pH: x  Gluc: x / Ketone: NEGATIVE  / Bili: Negative / Urobili: 0.2 E.U./dL   Blood: x / Protein: 30 mg/dL / Nitrite: NEGATIVE   Leuk Esterase: Trace / RBC: < 5 /HPF / WBC 5-10 /HPF   Sq Epi: x / Non Sq Epi: 0-5 /HPF / Bacteria: Present /HPF        RADIOLOGY & ADDITIONAL TESTS:    MEDICATIONS  (STANDING):  aspirin enteric coated 81 milliGRAM(s) Oral daily  atorvastatin 10 milliGRAM(s) Oral at bedtime  cefTRIAXone   IVPB 1 Gram(s) IV Intermittent every 24 hours  donepezil 5 milliGRAM(s) Oral at bedtime  ferrous    sulfate 325 milliGRAM(s) Oral daily  lacosamide 200 milliGRAM(s) Oral two times a day  levETIRAcetam 500 milliGRAM(s) Oral two times a day  memantine 10 milliGRAM(s) Oral daily  pantoprazole    Tablet 40 milliGRAM(s) Oral before breakfast    MEDICATIONS  (PRN):

## 2019-02-07 NOTE — PROGRESS NOTE ADULT - PROBLEM SELECTOR PLAN 1
Patient presenting with symptoms of increased lethargy, altered mental status, meeting SEPSIS criteria (febrile to 101.2 F, tachycardic to 96, Leukocytosis 14.07), negative lactate. CXR with no obvious infiltrates, UA + on admission, s/p Vanc/Zosyn in ED  -UA positive and will f/u UC (results should be back tomorrow)   - blood cx NGTD   -s/p Vanc/Zosyn in ED x1, c/w Ceftriaxone 1 g daily  - CXR without any infiltrates   - unclear why WBC continues to elevate from 12 to 13.5 today; starting on 80cc/hour NS overnight and will continue to trend WBC   - will follow up with [facility] tomorrow about wanting ID; currently does not want ID

## 2019-02-07 NOTE — PROGRESS NOTE ADULT - PROBLEM SELECTOR PLAN 1
Patient presenting with symptoms of increased lethargy, altered mental status, meeting SEPSIS criteria (febrile to 101.2 F, tachycardic to 96, Leukocytosis 14.07), negative lactate. CXR with no obvious infiltrates, UA + on admission, s/p Vanc/Zosyn in ED  -UA +, f/u Ucx  -f/u Bcx  -s/p Vanc/Zosyn in ED x1, c/w Ceftriaxone 1 g daily  -f/u official read of CXR. Patient presenting with symptoms of increased lethargy, altered mental status, meeting SEPSIS criteria (febrile to 101.2 F, tachycardic to 96, Leukocytosis 14.07), negative lactate. CXR with no obvious infiltrates, UA + on admission, s/p Vanc/Zosyn in ED  -UA positive and will f/u UC (results should be back tomorrow)   - blood cx NGTD   -s/p Vanc/Zosyn in ED x1, c/w Ceftriaxone 1 g daily  - CXR without any infiltrates   - unclear why WBC continues to elevate from 12 to 13.5 today; starting on 80cc/hour NS overnight and will continue to trend WBC   - will follow up with Latosha tomorrow about wanting ID; currently does not want ID

## 2019-02-07 NOTE — PROGRESS NOTE ADULT - SUBJECTIVE AND OBJECTIVE BOX
CC/ HPI Patient is a 73 year old female with dementia, CAD s/p stents, hypertension, transferred from [facility] with weakness and lethargy for the last few days, admitted for urosepsis, seen this morning the patient denies respiratory complaint.      PAST MEDICAL & SURGICAL HISTORY:  Dementia  Seizure  Anemia  HLD (hyperlipidemia)  HTN (hypertension)  CAD (coronary artery disease)  S/P partial gastrectomy  H/O thyroidectomy    SOCHX:   -  alcohol    FMHX: FA/MO  - contributory     ROS reviewed below with positive findings marked (+) :  GEN:  fever, chills ENT: tracheostomy,   epistaxis,  sinusitis COR: +CAD, CHF, + HTN, dysrhythmia PUL: COPD, ILD, asthma, pneumonia GI: PEG, dysphagia, hemorrhage, other DAVONTE: kidney disease, electrolyte disorder HEM:  +anemia, thrombus, coagulopathy, cancer ENDO:  thyroid disease, diabetes mellitus CNS:  +dementia, stroke, +seizure, PSY:  depression, anxiety, other      MEDICATIONS  (STANDING):  aspirin enteric coated 81 milliGRAM(s) Oral daily  atorvastatin 10 milliGRAM(s) Oral at bedtime  cefTRIAXone   IVPB 1 Gram(s) IV Intermittent every 24 hours  donepezil 5 milliGRAM(s) Oral at bedtime  ferrous    sulfate 325 milliGRAM(s) Oral daily  lacosamide 200 milliGRAM(s) Oral two times a day  levETIRAcetam 500 milliGRAM(s) Oral two times a day  memantine 10 milliGRAM(s) Oral daily  pantoprazole    Tablet 40 milliGRAM(s) Oral before breakfast  sodium chloride 0.9%. 1000 milliLiter(s) (80 mL/Hr) IV Continuous <Continuous>        Vital Signs Last 24 Hrs  T(C): 37.6 (07 Feb 2019 16:36), Max: 37.6 (07 Feb 2019 16:36)  T(F): 99.6 (07 Feb 2019 16:36), Max: 99.6 (07 Feb 2019 16:36)  HR: 93 (07 Feb 2019 16:36) (80 - 93)  BP: 147/83 (07 Feb 2019 16:36) (111/73 - 147/83)  RR: 16 (07 Feb 2019 16:36) (16 - 18)  SpO2: 97% (07 Feb 2019 16:36) (96% - 98%)    GENERAL:         comfortable,  - distress.  HEENT:            - trauma,  - icterus,  - injection,  - nasal discharge.  NECK:              - jugular venous distention, - thyromegaly.  LYMPH:           - lymphadenopathy, - masses.  RESP:               + clear,   - rales,   - rhonchi,   - wheezes.   COR:                S1S2   - gallops,  - rubs.  ABD:                bowel sounds,   soft, - tender, - distended.  EXT/MSC:         - cyanosis,  - clubbing,  - edema.    NEURO:             alert,   responds to stimuli.                          8.8    13.39 )-----------( 466      ( 07 Feb 2019 06:56 )             28.8     02-07    141  |  106  |  19  ----------------------------<  116<H>  4.2   |  21<L>  |  1.38<H>      X-ray Chest 1 View AP/PA (02.06.19)  Frontal view of the chest demonstrates low lung volumes.  Midline trachea. Normal heart size. No consolidation. No pleural effusion.          ASSESSMENT/PLAN    1)  Altered mental status  2)  Sepsis  3)  Hypertension  4)  Dementia    Satisfactory SpO2  Bronchodilators:  Atrovent/ albuterol q 4 – 6 hours as needed  ID/Antibiotics: Ceftriaxone, follow up cultures  Cardiac/HTN: BP satisfactory  GI: Rx/ prophylaxis c PPI/H2B  Heme: Rx/VT prophylaxis   Discuss with medical team

## 2019-02-08 LAB
ANION GAP SERPL CALC-SCNC: 13 MMOL/L — SIGNIFICANT CHANGE UP (ref 5–17)
BASOPHILS # BLD AUTO: 0.05 K/UL — SIGNIFICANT CHANGE UP (ref 0–0.2)
BASOPHILS NFR BLD AUTO: 0.4 % — SIGNIFICANT CHANGE UP (ref 0–2)
BUN SERPL-MCNC: 21 MG/DL — SIGNIFICANT CHANGE UP (ref 7–23)
CALCIUM SERPL-MCNC: 9.5 MG/DL — SIGNIFICANT CHANGE UP (ref 8.4–10.5)
CHLORIDE SERPL-SCNC: 106 MMOL/L — SIGNIFICANT CHANGE UP (ref 96–108)
CO2 SERPL-SCNC: 22 MMOL/L — SIGNIFICANT CHANGE UP (ref 22–31)
CREAT SERPL-MCNC: 1.39 MG/DL — HIGH (ref 0.5–1.3)
EOSINOPHIL # BLD AUTO: 0.3 K/UL — SIGNIFICANT CHANGE UP (ref 0–0.5)
EOSINOPHIL NFR BLD AUTO: 2.5 % — SIGNIFICANT CHANGE UP (ref 0–6)
GLUCOSE SERPL-MCNC: 98 MG/DL — SIGNIFICANT CHANGE UP (ref 70–99)
HCT VFR BLD CALC: 30.2 % — LOW (ref 34.5–45)
HGB BLD-MCNC: 9.1 G/DL — LOW (ref 11.5–15.5)
IMM GRANULOCYTES NFR BLD AUTO: 0.7 % — SIGNIFICANT CHANGE UP (ref 0–1.5)
LEVETIRACETAM SERPL-MCNC: 37.1 MCG/ML — SIGNIFICANT CHANGE UP (ref 12–46)
LYMPHOCYTES # BLD AUTO: 1.33 K/UL — SIGNIFICANT CHANGE UP (ref 1–3.3)
LYMPHOCYTES # BLD AUTO: 11.2 % — LOW (ref 13–44)
MAGNESIUM SERPL-MCNC: 1.9 MG/DL — SIGNIFICANT CHANGE UP (ref 1.6–2.6)
MCHC RBC-ENTMCNC: 29.1 PG — SIGNIFICANT CHANGE UP (ref 27–34)
MCHC RBC-ENTMCNC: 30.1 GM/DL — LOW (ref 32–36)
MCV RBC AUTO: 96.5 FL — SIGNIFICANT CHANGE UP (ref 80–100)
MONOCYTES # BLD AUTO: 0.69 K/UL — SIGNIFICANT CHANGE UP (ref 0–0.9)
MONOCYTES NFR BLD AUTO: 5.8 % — SIGNIFICANT CHANGE UP (ref 2–14)
NEUTROPHILS # BLD AUTO: 9.47 K/UL — HIGH (ref 1.8–7.4)
NEUTROPHILS NFR BLD AUTO: 79.4 % — HIGH (ref 43–77)
PLATELET # BLD AUTO: 443 K/UL — HIGH (ref 150–400)
POTASSIUM SERPL-MCNC: 4.4 MMOL/L — SIGNIFICANT CHANGE UP (ref 3.5–5.3)
POTASSIUM SERPL-SCNC: 4.4 MMOL/L — SIGNIFICANT CHANGE UP (ref 3.5–5.3)
RBC # BLD: 3.13 M/UL — LOW (ref 3.8–5.2)
RBC # FLD: 16 % — HIGH (ref 10.3–14.5)
SODIUM SERPL-SCNC: 141 MMOL/L — SIGNIFICANT CHANGE UP (ref 135–145)
WBC # BLD: 11.92 K/UL — HIGH (ref 3.8–10.5)
WBC # FLD AUTO: 11.92 K/UL — HIGH (ref 3.8–10.5)

## 2019-02-08 RX ADMIN — QUETIAPINE FUMARATE 12.5 MILLIGRAM(S): 200 TABLET, FILM COATED ORAL at 12:20

## 2019-02-08 RX ADMIN — Medication 81 MILLIGRAM(S): at 12:20

## 2019-02-08 RX ADMIN — MEMANTINE HYDROCHLORIDE 10 MILLIGRAM(S): 10 TABLET ORAL at 12:20

## 2019-02-08 RX ADMIN — LACOSAMIDE 200 MILLIGRAM(S): 50 TABLET ORAL at 17:31

## 2019-02-08 RX ADMIN — DONEPEZIL HYDROCHLORIDE 5 MILLIGRAM(S): 10 TABLET, FILM COATED ORAL at 21:59

## 2019-02-08 RX ADMIN — Medication 325 MILLIGRAM(S): at 12:20

## 2019-02-08 RX ADMIN — PANTOPRAZOLE SODIUM 40 MILLIGRAM(S): 20 TABLET, DELAYED RELEASE ORAL at 06:49

## 2019-02-08 RX ADMIN — CEFTRIAXONE 100 GRAM(S): 500 INJECTION, POWDER, FOR SOLUTION INTRAMUSCULAR; INTRAVENOUS at 12:21

## 2019-02-08 RX ADMIN — LACOSAMIDE 200 MILLIGRAM(S): 50 TABLET ORAL at 06:49

## 2019-02-08 RX ADMIN — ATORVASTATIN CALCIUM 10 MILLIGRAM(S): 80 TABLET, FILM COATED ORAL at 21:59

## 2019-02-08 RX ADMIN — LEVETIRACETAM 500 MILLIGRAM(S): 250 TABLET, FILM COATED ORAL at 12:20

## 2019-02-08 RX ADMIN — SODIUM CHLORIDE 80 MILLILITER(S): 9 INJECTION INTRAMUSCULAR; INTRAVENOUS; SUBCUTANEOUS at 06:49

## 2019-02-08 RX ADMIN — LEVETIRACETAM 500 MILLIGRAM(S): 250 TABLET, FILM COATED ORAL at 21:59

## 2019-02-08 NOTE — PHYSICAL THERAPY INITIAL EVALUATION ADULT - ADDITIONAL COMMENTS
Pt. is an unreliable historian, unable to provide further background information.   Pt. is currently admitted from [facility].

## 2019-02-08 NOTE — PROGRESS NOTE ADULT - PROBLEM SELECTOR PLAN 4
Patient with history of seizures, most recent episode approx 2 weeks ago with associated fall resulting in hospitalization at [facility]. On Keppra and Vimpat outpatient. Keppra level noted to be low on outpatient bloodwork at [facility] rehab.   - Keppra levels normal today   - c/w home medications, Keppra and Vimpat

## 2019-02-08 NOTE — PROGRESS NOTE ADULT - PROBLEM SELECTOR PLAN 3
Patient with ALE on admission, 1.68 (unknown baseline), blood work prior to admission at [facility] rehab 1.37 (2/5/19), likely pre-renal in the setting of sepsis  - creatinine still at 1.39 this AM (2/8) improved from admission   - will f/u urine lytes

## 2019-02-08 NOTE — PROGRESS NOTE ADULT - SUBJECTIVE AND OBJECTIVE BOX
CC/ HPI Patient is a 73 year old female with dementia, CAD s/p stents, hypertension, transferred from [facility] with weakness and lethargy for the last few days, admitted for urosepsis,  this morning the patient denies respiratory complaint.      PAST MEDICAL & SURGICAL HISTORY:  Dementia  Seizure  Anemia  HLD (hyperlipidemia)  HTN (hypertension)  CAD (coronary artery disease)  S/P partial gastrectomy  H/O thyroidectomy    SOCHX:   -  alcohol    FMHX: FA/MO  - contributory     ROS reviewed below with positive findings marked (+) :  GEN:  fever, chills ENT: tracheostomy,   epistaxis,  sinusitis COR: +CAD, CHF, + HTN, dysrhythmia PUL: COPD, ILD, asthma, pneumonia GI: PEG, dysphagia, hemorrhage, other DAVONTE: kidney disease, electrolyte disorder HEM:  +anemia, thrombus, coagulopathy, cancer ENDO:  thyroid disease, diabetes mellitus CNS:  +dementia, stroke, +seizure, PSY:  depression, anxiety, other      MEDICATIONS  (STANDING):  aspirin enteric coated 81 milliGRAM(s) Oral daily  atorvastatin 10 milliGRAM(s) Oral at bedtime  cefTRIAXone   IVPB 1 Gram(s) IV Intermittent every 24 hours  donepezil 5 milliGRAM(s) Oral at bedtime  ferrous    sulfate 325 milliGRAM(s) Oral daily  lacosamide 200 milliGRAM(s) Oral two times a day  levETIRAcetam 500 milliGRAM(s) Oral two times a day  memantine 10 milliGRAM(s) Oral daily  pantoprazole    Tablet 40 milliGRAM(s) Oral before breakfast  QUEtiapine 12.5 milliGRAM(s) Oral daily  sodium chloride 0.9%. 1000 milliLiter(s) (80 mL/Hr) IV Continuous <Continuous>    MEDICATIONS  (PRN):      Vital Signs Last 24 Hrs  T(C): 37.2 (08 Feb 2019 09:18), Max: 37.6 (07 Feb 2019 16:36)  T(F): 99 (08 Feb 2019 09:18), Max: 99.6 (07 Feb 2019 16:36)  HR: 82 (08 Feb 2019 09:18) (82 - 97)  BP: 121/82 (08 Feb 2019 09:18) (121/82 - 160/81)  BP(mean): --  RR: 20 (08 Feb 2019 09:18) (16 - 20)  SpO2: 99% (08 Feb 2019 09:18) (95% - 99%)    GENERAL:         comfortable,  - distress.  HEENT:            - trauma,  - icterus,  - injection,  - nasal discharge.  NECK:              - jugular venous distention, - thyromegaly.  LYMPH:           - lymphadenopathy, - masses.  RESP:               + clear,   - rales,   - rhonchi,   - wheezes.   COR:                S1S2   - gallops,  - rubs.  ABD:                bowel sounds,   soft, - tender, - distended.  EXT/MSC:         - cyanosis,  - clubbing,  - edema.    NEURO:             alert,   responds to stimuli.                            9.1    11.92 )-----------( 443      ( 08 Feb 2019 07:34 )             30.2     02-08    141  |  106  |  21  ----------------------------<  98  4.4   |  22  |  1.39<H>        X-ray Chest 1 View AP/PA (02.06.19)  Frontal view of the chest demonstrates low lung volumes.  Midline trachea. Normal heart size. No consolidation. No pleural effusion.          ASSESSMENT/PLAN    1)  Altered mental status  2)  Sepsis  3)  Hypertension  4)  Dementia    Satisfactory SpO2  Bronchodilators:  Atrovent/ albuterol q 4 – 6 hours as needed  ID/Antibiotics: Ceftriaxone, follow up cultures  Cardiac/HTN: BP satisfactory  GI: Rx/ prophylaxis c PPI/H2B  Heme: Rx/VT prophylaxis   Discussed with Dr. De La Torre, who will cover Feb 9 -11.

## 2019-02-08 NOTE — PROGRESS NOTE ADULT - PROBLEM SELECTOR PLAN 3
Patient with ALE on admission, 1.68 (unknown baseline), blood work prior to admission at [facility] rehab 1.37 (2/5/19), likely pre-renal in the setting of sepsis  -f/u urine lytes and calculate FeNa  -continue to trend sCr. Patient with ALE on admission, 1.68 (unknown baseline), blood work prior to admission at [facility] rehab 1.37 (2/5/19), likely pre-renal in the setting of sepsis  - creatinine still at 1.39 this AM (2/8) improved from admission   - will f/u urine lytes

## 2019-02-08 NOTE — CONSULT NOTE ADULT - ASSESSMENT
72 y/o F PMHx CAD s/p stents (>10 years ago), HTN, HLD, anemia, seizure disorder on Keppra, dementia (waxes and wanes) presenting from [facility] with increased weakness, lethargy for the last few days, admitted for sepsis 2/2 UTI.     Problem/Plan - 1:  ·  Problem: Sepsis.  Plan: Patient presenting with symptoms of increased lethargy, altered mental status, meeting SEPSIS criteria (febrile to 101.2 F, tachycardic to 96, Leukocytosis 14.07), negative lactate. CXR with no obvious infiltrates, UA + on admission, s/p Vanc/Zosyn in ED  -UA positive and will f/u UC (results should be back tomorrow)   - blood cx NGTD   -s/p Vanc/Zosyn in ED x1, c/w Ceftriaxone 1 g daily  - CXR without any infiltrates   - unclear why WBC continues to elevate from 12 to 13.5 today; starting on 80cc/hour NS overnight and will continue to trend WBC   - will follow up with Latosha tomorrow about wanting ID; currently does not want ID.    Problem/Plan - 2:  ·  Problem: UTI (urinary tract infection).  Plan: Plan as per above for Problem #1    #Dementia  -c/w home medication, Donepezil.     Problem/Plan - 3:  ·  Problem: ALE (acute kidney injury).  Plan: Patient with ALE on admission, 1.68 (unknown baseline), bloodwork prior to [facility] at [facility] 1.37 (2/5/19), likely pre-renal in the setting of sepsis  -f/u urine lytes and calculate FeNa  -continue to trend sCr.

## 2019-02-08 NOTE — PHYSICAL THERAPY INITIAL EVALUATION ADULT - PERTINENT HX OF CURRENT PROBLEM, REHAB EVAL
Pt. is a 73 y.o. female s/p recent admission to [facility] with seizure and UTI with subsequent discharge to [facility], currently returning from [facility] with increased lethargy and weakness , found to be septic due to UTI.

## 2019-02-08 NOTE — PROGRESS NOTE ADULT - PROBLEM SELECTOR PLAN 4
Patient with history of seizures, most recent episode approx 2 weeks ago with associated fall resulting in hospitalization at [facility]. On Keppra and Vimpat outpatient. Keppra level noted to be low on outpatient bloodwork at [facility] rehab.   - Keppra levels normal today   - c/w home medications, Keppra and Vimpat

## 2019-02-08 NOTE — CONSULT NOTE ADULT - SUBJECTIVE AND OBJECTIVE BOX
REASON FOR CONSULT:    HISTORY OF PRESENT ILLNESS:    74 y/o F PMHx CAD s/p stents (>10 years ago), HTN, HLD, anemia, seizure disorder on Keppra, dementia (waxes and wanes) presenting from [facility] rehab with increased weakness, lethargy for the last few days. Of note, patient with recent admission to [facility] for 2 weeks (discharged on Friday 2/1). Patient was admitted then for seizure/fall with course c/b UTI. Patient discharged on Cefpodoxime. Since her discharge to [facility] rehab, patient noted to become more lethargic and spiked a temperature of 101F. Patient otherwise with no symptoms of chest pain, shortness of breath, cough, nausea/vomiting, diarrhea, changes in urinary habits. Remainder of ROS negative.     PAST MEDICAL & SURGICAL HISTORY:  Dementia  Seizure  Anemia  HLD (hyperlipidemia)  HTN (hypertension)  CAD (coronary artery disease)  S/P partial gastrectomy  H/O thyroidectomy      [ ] Diabetes   [ ] Hypertension  [ ] Hyperlipidemia  [ ] CAD  [ ] PCI  [ ] CABG    PREVIOUS DIAGNOSTIC TESTING:    [ ] Echocardiogram:  [ ]  Catheterization:  [ ] Stress Test:  	    MEDICATIONS:    cefTRIAXone   IVPB 1 Gram(s) IV Intermittent every 24 hours      donepezil 5 milliGRAM(s) Oral at bedtime  lacosamide 200 milliGRAM(s) Oral two times a day  levETIRAcetam 500 milliGRAM(s) Oral two times a day  memantine 10 milliGRAM(s) Oral daily    pantoprazole    Tablet 40 milliGRAM(s) Oral before breakfast    atorvastatin 10 milliGRAM(s) Oral at bedtime    aspirin enteric coated 81 milliGRAM(s) Oral daily  ferrous    sulfate 325 milliGRAM(s) Oral daily      FAMILY HISTORY:  Family history of hypertension (Mother)  Family history of diabetes mellitus (Mother)      SOCIAL HISTORY:    [ x] Non-smoker  [ ] Smoker  [ ] Alcohol    FAMILY HX: NC    Allergies    No Known Allergies    Intolerances    	    REVIEW OF SYSTEMS:    [x] as per HPI  CONSTITUTIONAL: No fever, weight loss, or fatigue  ENT:  No difficulty hearing, tinnitus, vertigo; No sinus or throat pain  RESPIRATORY: No cough, wheezing, chills or hemoptysis; No Shortness of Breath  CARDIOVASCULAR: No chest pain, palpitations, dizziness, or leg swelling  GASTROINTESTINAL: No abdominal or epigastric pain. No nausea, vomiting, or hematemesis; No diarrhea or constipation. No melena or hematochezia.  GENITOURINARY: No dysuria, frequency, hematuria, or incontinence  NEUROLOGICAL: No headaches, memory loss, loss of strength, numbness, or tremors  MUSCULOSKELETAL: No joint pain or swelling; No muscle, back, or extremity pain  [x] All others negative	  [ ] Unable to obtain    PHYSICAL EXAM:  T(C): 36.9 (02-07-19 @ 08:50), Max: 36.9 (02-06-19 @ 14:40)  HR: 83 (02-07-19 @ 08:50) (73 - 83)  BP: 111/73 (02-07-19 @ 08:50) (111/73 - 147/80)  RR: 16 (02-07-19 @ 08:50) (16 - 18)  SpO2: 98% (02-07-19 @ 08:50) (96% - 99%)  Wt(kg): --  I&O's Summary      Appearance: Normal	  HEENT:   Normal oral mucosa, PERRL, EOMI	  Lymphatic: No lymphadenopathy  Cardiovascular: Normal S1 S2, No JVD, No murmurs, No edema  Respiratory: Lungs clear to auscultation	  Psychiatry: A & O x 3, Mood & affect appropriate  Gastrointestinal:  Soft, Non-tender, + BS	  Skin: No rashes, No ecchymoses, No cyanosis	  Neurologic: Non-focal  Extremities: Normal range of motion, No clubbing, cyanosis or edema  Vascular: Peripheral pulses palpable 2+ bilaterally    TELEMETRY: 	    ECG:  < from: 12 Lead ECG (02.05.19 @ 23:52) >  Diagnosis Line Normal sinus rhythm    < end of copied text >    ECHO:   STRESS:  CATH:  	  RADIOLOGY:  CXR: < from: Xray Chest 1 View AP/PA (02.06.19 @ 01:45) >  IMPRESSION: Frontal view of the chest demonstrates low lung volumes.   Midline trachea. Normal heart size. No consolidation. No pleural effusion.    < end of copied text >    CT:  US:   	  	  LABS:	 	    CARDIAC MARKERS:                                  8.8    13.39 )-----------( 466      ( 07 Feb 2019 06:56 )             28.8     02-07    141  |  106  |  19  ----------------------------<  116<H>  4.2   |  21<L>  |  1.38<H>    Ca    9.6      07 Feb 2019 06:56  Mg     2.0     02-07    TPro  7.3  /  Alb  3.2<L>  /  TBili  0.3  /  DBili  x   /  AST  36  /  ALT  39  /  AlkPhos  103  02-06    proBNP:   Lipid Profile:   HgA1c:   TSH:     ASSESSMENT/PLAN: 	    HLD (hyperlipidemia).  Plan: Continue home medication, Lipitor 10 mg qHS.        HTN (hypertension). Plan: Patient with history of HTN, on Lisinopril 5 mg daily and Lopressor 25 mg BID  -resume acei and BB as tolerated         CAD (coronary artery disease).  Plan: Patient with history of CAD with multiple stents, last placed >10 years ago, on ASA 81  -c/w ASA 81 as no signs of active bleeding on exam.
Patient is a 73y old  Female who presents with a chief complaint of fevers (07 Feb 2019 20:53)       HPI:  74 y/o F PMHx CAD s/p stents (>10 years ago), HTN, HLD, anemia, seizure disorder on Keppra, dementia (waxes and wanes) presenting from [facility] rehab with increased weakness, lethargy for the last few days. Of note, patient with recent admission to [facility] for 2 weeks (discharged on Friday 2/1). Patient was admitted then for seizure/fall with course c/b UTI. Patient discharged on Cefpodoxime. Since her discharge to [facility] rehab, patient noted to become more lethargic and spiked a temperature of 101F. Patient otherwise with no symptoms of chest pain, shortness of breath, cough, nausea/vomiting, diarrhea, changes in urinary habits. Remainder of ROS negative.     ED VS: T 97.5-101.2F; HR 80-94; BP /79; RR 20; Sp02 95 RA  ED Course: Leukocytosis 14.07, Hgb 7.7 (normocytic), thrombocytosis 530, lactate 0.9, sCr 1.68 (unknown baseline), CT head negative, UA +, RVP negative, CXR performed (pending official read), preliminary read with no obvious infiltrates, s/p 2L NS, s/p Vanc/Zosyn (06 Feb 2019 02:56)      PAST MEDICAL & SURGICAL HISTORY:  Dementia  Seizure  Anemia  HLD (hyperlipidemia)  HTN (hypertension)  CAD (coronary artery disease)  S/P partial gastrectomy  H/O thyroidectomy      MEDICATIONS  (STANDING):  aspirin enteric coated 81 milliGRAM(s) Oral daily  atorvastatin 10 milliGRAM(s) Oral at bedtime  cefTRIAXone   IVPB 1 Gram(s) IV Intermittent every 24 hours  donepezil 5 milliGRAM(s) Oral at bedtime  ferrous    sulfate 325 milliGRAM(s) Oral daily  lacosamide 200 milliGRAM(s) Oral two times a day  levETIRAcetam 500 milliGRAM(s) Oral two times a day  memantine 10 milliGRAM(s) Oral daily  pantoprazole    Tablet 40 milliGRAM(s) Oral before breakfast  QUEtiapine 12.5 milliGRAM(s) Oral daily  sodium chloride 0.9%. 1000 milliLiter(s) (80 mL/Hr) IV Continuous <Continuous>    MEDICATIONS  (PRN):      Social History: transferred from [facility] subacute rehab, prior to [facility], lives alone in an elevator accessible apartment building, had HHA 3 days x 4 hrs    Functional Level Prior to Admission: unknown at [facility], patient refused to answer    FAMILY HISTORY:  Family history of hypertension (Mother)  Family history of diabetes mellitus (Mother)      CBC Full  -  ( 08 Feb 2019 07:34 )  WBC Count : 11.92 K/uL  Hemoglobin : 9.1 g/dL  Hematocrit : 30.2 %  Platelet Count - Automated : 443 K/uL  Mean Cell Volume : 96.5 fl  Mean Cell Hemoglobin : 29.1 pg  Mean Cell Hemoglobin Concentration : 30.1 gm/dL  Auto Neutrophil # : 9.47 K/uL  Auto Lymphocyte # : 1.33 K/uL  Auto Monocyte # : 0.69 K/uL  Auto Eosinophil # : 0.30 K/uL  Auto Basophil # : 0.05 K/uL  Auto Neutrophil % : 79.4 %  Auto Lymphocyte % : 11.2 %  Auto Monocyte % : 5.8 %  Auto Eosinophil % : 2.5 %  Auto Basophil % : 0.4 %      02-08    141  |  106  |  21  ----------------------------<  98  4.4   |  22  |  1.39<H>    Ca    9.5      08 Feb 2019 07:34  Mg     1.9     02-08              Radiology:    < from: Xray Chest 1 View AP/PA (02.06.19 @ 01:45) >  EXAM:  XR CHEST AP OR PA 1V                          PROCEDURE DATE:  02/06/2019          INTERPRETATION:  CLINICAL INDICATION: 73-year-old with altered mental   status.      IMPRESSION: Frontal view of the chest demonstrates low lung volumes.   Midline trachea. Normal heart size. No consolidation. No pleural effusion.          < from: CT Head No Cont (02.06.19 @ 01:50) >  EXAM:  CT BRAIN                          PROCEDURE DATE:  02/06/2019          INTERPRETATION:  CT OF THE HEAD WITHOUT CONTRAST    INDICATION:  fever, ams, s/p fall/sz two weeks ago  r/o ICH    TECHNIQUE: An axial noncontrast CT scan of the head wasperformed.   Sagittal and coronal reformatted images were also obtained.    CONTRAST:  None    COMPARISON:  None    FINDINGS:  There is no hydrocephalus. The basal cisterns are patent. There is no   focal mass effect or midline shift. There are no extra-axial collections   or intraparenchymal hemorrhage.    There are scattered regions of hypodensity involving the periventricular   and subcortical white matter consistent with chronic microvascular   ischemic changes. There is no evidence of acute transcortical territorial   infarction.    The globes and retrobulbar fat are intact.    The mastoids and paranasal sinuses are well aerated. The calvaria is   intact. Right lateral periorbital soft tissue swelling.    IMPRESSION:  No hydrocephalus, midline shift, acute intracranial hemorrhage or   demarcated territorial infarct.              Vital Signs Last 24 Hrs  T(C): 37.4 (08 Feb 2019 06:11), Max: 37.6 (07 Feb 2019 16:36)  T(F): 99.3 (08 Feb 2019 06:11), Max: 99.6 (07 Feb 2019 16:36)  HR: 97 (08 Feb 2019 06:11) (93 - 97)  BP: 142/82 (08 Feb 2019 06:11) (142/82 - 160/81)  BP(mean): --  RR: 18 (08 Feb 2019 06:11) (16 - 18)  SpO2: 98% (08 Feb 2019 06:11) (95% - 98%)    REVIEW OF SYSTEMS:    CONSTITUTIONAL: No fever, weight loss, or fatigue  EYES: No eye pain, visual disturbances, or discharge  ENMT:  No difficulty hearing, tinnitus, vertigo; No sinus or throat pain  NECK: No pain or stiffness  BREASTS: No pain, masses, or nipple discharge  RESPIRATORY: No cough, wheezing, chills or hemoptysis; No shortness of breath  CARDIOVASCULAR: No chest pain, palpitations, dizziness, or leg swelling  GASTROINTESTINAL: No abdominal or epigastric pain. No nausea, vomiting, or hematemesis; No diarrhea or constipation. No melena or hematochezia.  GENITOURINARY: No dysuria, frequency, hematuria, or incontinence  NEUROLOGICAL: No headaches, memory loss, loss of strength, numbness, or tremors  SKIN: No itching, burning, rashes, or lesions   LYMPH NODES: No enlarged glands  ENDOCRINE: No heat or cold intolerance; No hair loss  MUSCULOSKELETAL: No joint pain or swelling; No muscle, back, or extremity pain  PSYCHIATRIC: No depression, anxiety, mood swings, or difficulty sleeping  HEME/LYMPH: No easy bruising, or bleeding gums  ALLERGY AND IMMUNOLOGIC: No hives or eczema  VASCULAR: no swelling, erythema      Physical Exam: WDWN 72 yo [place] woman lying in semi Sierra's position, c/o feeling tired    Head: normocephalic, atraumatic    Eyes: PERRLA, EOMI, no nystagmus, sclera anicteric    ENT: nasal discharge, uvula midline, no oropharyngeal erythema/exudate    Neck: supple, negative JVD, negative carotid bruits, no thyromegaly    Chest: CTA bilaterally, neg wheeze, rhonchi, rales, crackles, egophany    Cardiovascular: regular rate and rhythm, neg murmurs/rubs/gallops    Abdomen: soft, non distended, non tender, negative rebound/guarding, normal bowel sounds, neg hepatosplenomegaly    Extremities: WWP, neg cyanosis/clubbing/edema, negative calf tenderness to palpation, negative Mayte's sign    :     Neurologic Exam:    Alert and oriented to person, place, date/year, speech fluent w/o dysarthria, recent and remote memory intact, repetition intact, comprehension intact,     Cranial Nerves:     II:                       pupils equal, round and reactive to light, visual fields intact   III/ IV/VI:            extraocular movements intact, neg nystagmus, ptosis  V:                       facial sensation intact, V1-3 normal  VII:                     face symmetric, no droop, normal eye closure and smile  VIII:                    hearing intact to finger rub bilaterally  IX/ X:                 soft palate rise symmetrical  XI:                      head turning, shoulder shrug normal  XII:                     tongue midline    Motor Exam:    Upper Extremities:     RIght:  poor effort > 3+/5               negative drift    Left :     poor effort > 3+/5               negative drift    Lower Extremities:                 Right:      poor effort > 3+/5    Left:       poor effort > 3+/5      Sensory:    intact to LT/PP in all UE/LE dermatomes    DTR:            = biceps/     triceps/     brachioradialis                      = patella/   medial hamstring/ankle                      neg clonus                      neg Babinski                      neg Hoffmans      Gait:  not tested        PM&R Impression:    1) deconditioned  2) no focal weakness  3) gait dysfunction        Recommendations:    1) Physical therapy focusing on therapeutic exercises, bed mobility/transfer out of bed evaluation, progressive ambulation with assistive devices prn.    2) Anticipated [facility]

## 2019-02-08 NOTE — PROGRESS NOTE ADULT - PROBLEM SELECTOR PLAN 1
Patient presenting with symptoms of increased lethargy, altered mental status, meeting SEPSIS criteria (febrile to 101.2 F, tachycardic to 96, Leukocytosis 14.07), negative lactate. CXR with no obvious infiltrates, UA + on admission, s/p Vanc/Zosyn in ED  -UA positive and UC negative   - blood cx NGTD   -s/p Vanc/Zosyn in ED x1, c/w Ceftriaxone 1 g daily  - CXR without any infiltrates   - unclear why WBC continues to elevate from 12 to 13.5 today down to 11.9 (2/8)  - Latosha does not want ID for now; pending gallium scan to look for another source of elevated WBC

## 2019-02-08 NOTE — PROGRESS NOTE ADULT - SUBJECTIVE AND OBJECTIVE BOX
OVERNIGHT EVENTS: Was very agitated overnight and confused. Gave 12.5 Seroquel and 0.5 haldol.     SUBJECTIVE: This morning patient is more lethargic but arousable and able to respond to questions Patient reports no chest pain, shortness of breath or fevers/chills overnight. Patient pending Gallium scan as WBC continues to rise.     Vital Signs Last 12 Hrs  T(F): 99 (02-08-19 @ 09:18), Max: 99.3 (02-08-19 @ 06:11)  HR: 82 (02-08-19 @ 09:18) (82 - 97)  BP: 121/82 (02-08-19 @ 09:18) (121/82 - 142/82)  RR: 20 (02-08-19 @ 09:18) (18 - 20)  SpO2: 99% (02-08-19 @ 09:18) (98% - 99%)  I&O's Summary      PHYSICAL EXAM:  Constitutional: NAD, comfortable in bed.  HEENT: NC/AT, PERRLA, EOMI, no conjunctival pallor or scleral icterus, MMM  Neck: Supple, no JVD  Respiratory: Normal rate, rhythm, depth, effort. CTAB. No w/r/r.   Cardiovascular: RRR, normal S1 and S2, no m/r/g.   Gastrointestinal: +BS, soft NTND, no guarding or rebound tenderness, no palpable masses   Extremities: wwp; no cyanosis, clubbing or edema.   Vascular: Pulses equal and strong throughout.   Neurological: AAOx1 (self), no CN deficits, strength and sensation intact throughout.   Skin: No gross skin abnormalities or rashes        LABS:                        9.1    11.92 )-----------( 443      ( 08 Feb 2019 07:34 )             30.2     02-08    141  |  106  |  21  ----------------------------<  98  4.4   |  22  |  1.39<H>    Ca    9.5      08 Feb 2019 07:34  Mg     1.9     02-08            RADIOLOGY & ADDITIONAL TESTS:    MEDICATIONS  (STANDING):  aspirin enteric coated 81 milliGRAM(s) Oral daily  atorvastatin 10 milliGRAM(s) Oral at bedtime  cefTRIAXone   IVPB 1 Gram(s) IV Intermittent every 24 hours  donepezil 5 milliGRAM(s) Oral at bedtime  ferrous    sulfate 325 milliGRAM(s) Oral daily  lacosamide 200 milliGRAM(s) Oral two times a day  levETIRAcetam 500 milliGRAM(s) Oral two times a day  memantine 10 milliGRAM(s) Oral daily  pantoprazole    Tablet 40 milliGRAM(s) Oral before breakfast  QUEtiapine 12.5 milliGRAM(s) Oral daily  sodium chloride 0.9%. 1000 milliLiter(s) (80 mL/Hr) IV Continuous <Continuous>    MEDICATIONS  (PRN):

## 2019-02-09 LAB
ANION GAP SERPL CALC-SCNC: 13 MMOL/L — SIGNIFICANT CHANGE UP (ref 5–17)
BASOPHILS # BLD AUTO: 0.08 K/UL — SIGNIFICANT CHANGE UP (ref 0–0.2)
BASOPHILS NFR BLD AUTO: 0.7 % — SIGNIFICANT CHANGE UP (ref 0–2)
BUN SERPL-MCNC: 22 MG/DL — SIGNIFICANT CHANGE UP (ref 7–23)
CALCIUM SERPL-MCNC: 9.6 MG/DL — SIGNIFICANT CHANGE UP (ref 8.4–10.5)
CHLORIDE SERPL-SCNC: 110 MMOL/L — HIGH (ref 96–108)
CO2 SERPL-SCNC: 18 MMOL/L — LOW (ref 22–31)
CREAT SERPL-MCNC: 1.39 MG/DL — HIGH (ref 0.5–1.3)
EOSINOPHIL # BLD AUTO: 0.26 K/UL — SIGNIFICANT CHANGE UP (ref 0–0.5)
EOSINOPHIL NFR BLD AUTO: 2.2 % — SIGNIFICANT CHANGE UP (ref 0–6)
GLUCOSE SERPL-MCNC: 99 MG/DL — SIGNIFICANT CHANGE UP (ref 70–99)
HCT VFR BLD CALC: 30.8 % — LOW (ref 34.5–45)
HGB BLD-MCNC: 9.4 G/DL — LOW (ref 11.5–15.5)
IMM GRANULOCYTES NFR BLD AUTO: 0.6 % — SIGNIFICANT CHANGE UP (ref 0–1.5)
LYMPHOCYTES # BLD AUTO: 1.97 K/UL — SIGNIFICANT CHANGE UP (ref 1–3.3)
LYMPHOCYTES # BLD AUTO: 16.4 % — SIGNIFICANT CHANGE UP (ref 13–44)
MAGNESIUM SERPL-MCNC: 1.9 MG/DL — SIGNIFICANT CHANGE UP (ref 1.6–2.6)
MCHC RBC-ENTMCNC: 29.9 PG — SIGNIFICANT CHANGE UP (ref 27–34)
MCHC RBC-ENTMCNC: 30.5 GM/DL — LOW (ref 32–36)
MCV RBC AUTO: 98.1 FL — SIGNIFICANT CHANGE UP (ref 80–100)
MONOCYTES # BLD AUTO: 0.7 K/UL — SIGNIFICANT CHANGE UP (ref 0–0.9)
MONOCYTES NFR BLD AUTO: 5.8 % — SIGNIFICANT CHANGE UP (ref 2–14)
NEUTROPHILS # BLD AUTO: 8.95 K/UL — HIGH (ref 1.8–7.4)
NEUTROPHILS NFR BLD AUTO: 74.3 % — SIGNIFICANT CHANGE UP (ref 43–77)
PLATELET # BLD AUTO: 391 K/UL — SIGNIFICANT CHANGE UP (ref 150–400)
POTASSIUM SERPL-MCNC: 5 MMOL/L — SIGNIFICANT CHANGE UP (ref 3.5–5.3)
POTASSIUM SERPL-SCNC: 5 MMOL/L — SIGNIFICANT CHANGE UP (ref 3.5–5.3)
RBC # BLD: 3.14 M/UL — LOW (ref 3.8–5.2)
RBC # FLD: 15.6 % — HIGH (ref 10.3–14.5)
SODIUM SERPL-SCNC: 141 MMOL/L — SIGNIFICANT CHANGE UP (ref 135–145)
WBC # BLD: 12.03 K/UL — HIGH (ref 3.8–10.5)
WBC # FLD AUTO: 12.03 K/UL — HIGH (ref 3.8–10.5)

## 2019-02-09 PROCEDURE — 78806: CPT | Mod: 26

## 2019-02-09 RX ADMIN — SODIUM CHLORIDE 80 MILLILITER(S): 9 INJECTION INTRAMUSCULAR; INTRAVENOUS; SUBCUTANEOUS at 21:43

## 2019-02-09 RX ADMIN — PANTOPRAZOLE SODIUM 40 MILLIGRAM(S): 20 TABLET, DELAYED RELEASE ORAL at 06:27

## 2019-02-09 RX ADMIN — DONEPEZIL HYDROCHLORIDE 5 MILLIGRAM(S): 10 TABLET, FILM COATED ORAL at 21:43

## 2019-02-09 RX ADMIN — LACOSAMIDE 200 MILLIGRAM(S): 50 TABLET ORAL at 18:00

## 2019-02-09 RX ADMIN — MEMANTINE HYDROCHLORIDE 10 MILLIGRAM(S): 10 TABLET ORAL at 13:26

## 2019-02-09 RX ADMIN — SODIUM CHLORIDE 80 MILLILITER(S): 9 INJECTION INTRAMUSCULAR; INTRAVENOUS; SUBCUTANEOUS at 06:28

## 2019-02-09 RX ADMIN — LEVETIRACETAM 500 MILLIGRAM(S): 250 TABLET, FILM COATED ORAL at 21:43

## 2019-02-09 RX ADMIN — LEVETIRACETAM 500 MILLIGRAM(S): 250 TABLET, FILM COATED ORAL at 10:11

## 2019-02-09 RX ADMIN — LACOSAMIDE 200 MILLIGRAM(S): 50 TABLET ORAL at 06:27

## 2019-02-09 RX ADMIN — ATORVASTATIN CALCIUM 10 MILLIGRAM(S): 80 TABLET, FILM COATED ORAL at 21:43

## 2019-02-09 RX ADMIN — Medication 325 MILLIGRAM(S): at 13:26

## 2019-02-09 RX ADMIN — Medication 81 MILLIGRAM(S): at 13:26

## 2019-02-09 RX ADMIN — CEFTRIAXONE 100 GRAM(S): 500 INJECTION, POWDER, FOR SOLUTION INTRAMUSCULAR; INTRAVENOUS at 13:26

## 2019-02-09 RX ADMIN — QUETIAPINE FUMARATE 12.5 MILLIGRAM(S): 200 TABLET, FILM COATED ORAL at 13:29

## 2019-02-09 NOTE — PROGRESS NOTE ADULT - SUBJECTIVE AND OBJECTIVE BOX
Interventional, Pulmonary, Critical, Chest Special Procedures.    Pt was seen and fully examined by myself.     Time spent with patient in minutes:37    Patient is a 73y old  Female who presents with a chief complaint of fevers (08 Feb 2019 22:58)The patient appears comfortable, engages in Ecuadorean, refuses speaking English. Denies any pain when seen, eupneic on RA    HPI:  72 y/o F PMHx CAD s/p stents (>10 years ago), HTN, HLD, anemia, seizure disorder on Keppra, dementia (waxes and wanes) presenting from [facility] rehab with increased weakness, lethargy for the last few days. Of note, patient with recent admission to [facility] for 2 weeks (discharged on Friday 2/1). Patient was admitted then for seizure/fall with course c/b UTI. Patient discharged on Cefpodoxime. Since her discharge to [facility], patient noted to become more lethargic and spiked a temperature of 101F. Patient otherwise with no symptoms of chest pain, shortness of breath, cough, nausea/vomiting, diarrhea, changes in urinary habits. Remainder of ROS negative.     ED VS: T 97.5-101.2F; HR 80-94; BP /79; RR 20; Sp02 95 RA  ED Course: Leukocytosis 14.07, Hgb 7.7 (normocytic), thrombocytosis 530, lactate 0.9, sCr 1.68 (unknown baseline), CT head negative, UA +, RVP negative, CXR performed (pending official read), preliminary read with no obvious infiltrates, s/p 2L NS, s/p Vanc/Zosyn (06 Feb 2019 02:56)    REVIEW OF SYSTEMS:  ROS reviewed below with positive findings marked (+) :  GEN:  fever, chills ENT: tracheostomy,   epistaxis,  sinusitis COR: +CAD, CHF, + HTN, dysrhythmia PUL: COPD, ILD, asthma, pneumonia GI: PEG, dysphagia, hemorrhage, other DAVONTE: kidney disease, electrolyte disorder HEM:  +anemia, thrombus, coagulopathy, cancer ENDO:  thyroid disease, diabetes mellitus CNS:  +dementia, stroke, +seizure, PSY:  depression, anxiety, other    PAST MEDICAL & SURGICAL HISTORY:  Dementia  Seizure  Anemia  HLD (hyperlipidemia)  HTN (hypertension)  CAD (coronary artery disease)  S/P partial gastrectomy  H/O thyroidectomy    FAMILY HISTORY:  Family history of hypertension (Mother)  Family history of diabetes mellitus (Mother)    SOCIAL HISTORY:      - Tobacco     - ETOH    Allergies    No Known Allergies    Intolerances      Vital Signs Last 24 Hrs  T(C): 36.9 (09 Feb 2019 16:45), Max: 36.9 (09 Feb 2019 04:52)  T(F): 98.4 (09 Feb 2019 16:45), Max: 98.5 (09 Feb 2019 04:52)  HR: 87 (09 Feb 2019 16:45) (84 - 90)  BP: 152/92 (09 Feb 2019 16:45) (130/76 - 161/91)  BP(mean): --  RR: 19 (09 Feb 2019 16:45) (18 - 19)  SpO2: 97% (09 Feb 2019 16:45) (93% - 98%)    02-08 @ 07:01  -  02-09 @ 07:00  --------------------------------------------------------  IN: 960 mL / OUT: 0 mL / NET: 960 mL        PHYSICAL EXAM:    GENERAL:         comfortable,  - distress.  HEENT:            - trauma,  - icterus,  - injection,  - nasal discharge.  NECK:              - jugular venous distention, - thyromegaly.  LYMPH:           - lymphadenopathy, - masses.  RESP:               + clear,   - rales,   - rhonchi,   - wheezes.   COR:                S1S2   - gallops,  - rubs.  ABD:                bowel sounds,   soft, - tender, - distended.  EXT/MSC:         - cyanosis,  - clubbing,  - edema.    NEURO:             alert,   responds to stimuli.  DEVICES:  - DENTURES   +IV R / L     - ETUBE   -TRACH   -CTUBE  R / L      LABS:                          9.4    12.03 )-----------( 391      ( 09 Feb 2019 08:33 )             30.8     02-09    141  |  110<H>  |  22  ----------------------------<  99  5.0   |  18<L>  |  1.39<H>    Ca    9.6      09 Feb 2019 08:33  Mg     1.9     02-09        RADIOLOGY & ADDITIONAL STUDIES (The following images were personally reviewed):

## 2019-02-09 NOTE — PROGRESS NOTE ADULT - SUBJECTIVE AND OBJECTIVE BOX
OVERNIGHT EVENTS:    SUBJECTIVE: confusion at basline    Vital Signs Last 12 Hrs  T(F): 99 (02-08-19 @ 09:18), Max: 99.3 (02-08-19 @ 06:11)  HR: 82 (02-08-19 @ 09:18) (82 - 97)  BP: 121/82 (02-08-19 @ 09:18) (121/82 - 142/82)  RR: 20 (02-08-19 @ 09:18) (18 - 20)  SpO2: 99% (02-08-19 @ 09:18) (98% - 99%)  I&O's Summary      PHYSICAL EXAM:  Constitutional: NAD, comfortable in bed.  HEENT: NC/AT, PERRLA, EOMI, no conjunctival pallor or scleral icterus, MMM  Neck: Supple, no JVD  Respiratory: Normal rate, rhythm, depth, effort. CTAB. No w/r/r.   Cardiovascular: RRR, normal S1 and S2, no m/r/g.   Gastrointestinal: +BS, soft NTND, no guarding or rebound tenderness, no palpable masses   Extremities: wwp; no cyanosis, clubbing or edema.   Vascular: Pulses equal and strong throughout.   Neurological: AAOx1 (self), no CN deficits, strength and sensation intact throughout.   Skin: No gross skin abnormalities or rashes        LABS:                        9.1    11.92 )-----------( 443      ( 08 Feb 2019 07:34 )             30.2   Complete Blood Count + Automated Diff in AM (02.09.19 @ 08:33)    WBC Count: 12.03 K/uL    RBC Count: 3.14 M/uL    Hemoglobin: 9.4 g/dL    Hematocrit: 30.8 %    Mean Cell Volume: 98.1 fl    Mean Cell Hemoglobin: 29.9 pg    Mean Cell Hemoglobin Conc: 30.5 gm/dL    Red Cell Distrib Width: 15.6 %    Platelet Count - Automated: 391 K/uL    Auto Neutrophil #: 8.95 K/uL    Auto Lymphocyte #: 1.97 K/uL    Auto Monocyte #: 0.70 K/uL    Auto Eosinophil #: 0.26 K/uL    Auto Basophil #: 0.08 K/uL    Auto Neutrophil %: 74.3: Differential percentages must be correlated with absolute numbers for  clinical significance. %    Auto Lymphocyte %: 16.4 %    Auto Monocyte %: 5.8 %    Auto Eosinophil %: 2.2 %    Auto Basophil %: 0.7 %    Auto Immature Granulocyte %: 0.6 %    Basic Metabolic Panel in AM (02.09.19 @ 08:33)    Anion Gap, Serum: 13 mmol/L      02-08    141  |  106  |  21  ----------------------------<  98  4.4   |  22  |  1.39<H>    Ca    9.5      08 Feb 2019 07:34  Mg     1.9     02-08            RADIOLOGY & ADDITIONAL TESTS:    MEDICATIONS  (STANDING):  aspirin enteric coated 81 milliGRAM(s) Oral daily  atorvastatin 10 milliGRAM(s) Oral at bedtime  cefTRIAXone   IVPB 1 Gram(s) IV Intermittent every 24 hours  donepezil 5 milliGRAM(s) Oral at bedtime  ferrous    sulfate 325 milliGRAM(s) Oral daily  lacosamide 200 milliGRAM(s) Oral two times a day  levETIRAcetam 500 milliGRAM(s) Oral two times a day  memantine 10 milliGRAM(s) Oral daily  pantoprazole    Tablet 40 milliGRAM(s) Oral before breakfast  QUEtiapine 12.5 milliGRAM(s) Oral daily  sodium chloride 0.9%. 1000 milliLiter(s) (80 mL/Hr) IV Continuous <Continuous>    MEDICATIONS  (PRN):

## 2019-02-09 NOTE — PROGRESS NOTE ADULT - PROBLEM SELECTOR PLAN 3
Patient with ALE on admission, 1.68 (unknown baseline), blood work prior to admission at [facility] rehab 1.37 (2/5/19), likely pre-renal in the setting of sepsis  - creatinine still at 1.39 this AM (2/8) improved from admission   - will f/u urine lytes

## 2019-02-09 NOTE — PROGRESS NOTE ADULT - PROBLEM SELECTOR PLAN 4
Patient with history of seizures, most recent episode approx 2 weeks ago with associated fall resulting in hospitalization at [facility]. On Keppra and Vimpat outpatient. Keppra level noted to be low on outpatient bloodwork at [facility] rehab.   - Keppra levels normal today   - c/w home medications, Keppra and Vimpat

## 2019-02-10 ENCOUNTER — TRANSCRIPTION ENCOUNTER (OUTPATIENT)
Age: 74
End: 2019-02-10

## 2019-02-10 LAB
ANION GAP SERPL CALC-SCNC: 13 MMOL/L — SIGNIFICANT CHANGE UP (ref 5–17)
BUN SERPL-MCNC: 17 MG/DL — SIGNIFICANT CHANGE UP (ref 7–23)
CALCIUM SERPL-MCNC: 9.6 MG/DL — SIGNIFICANT CHANGE UP (ref 8.4–10.5)
CHLORIDE SERPL-SCNC: 107 MMOL/L — SIGNIFICANT CHANGE UP (ref 96–108)
CO2 SERPL-SCNC: 21 MMOL/L — LOW (ref 22–31)
CREAT SERPL-MCNC: 1.32 MG/DL — HIGH (ref 0.5–1.3)
GLUCOSE SERPL-MCNC: 99 MG/DL — SIGNIFICANT CHANGE UP (ref 70–99)
HCT VFR BLD CALC: 27.7 % — LOW (ref 34.5–45)
HGB BLD-MCNC: 8.6 G/DL — LOW (ref 11.5–15.5)
MAGNESIUM SERPL-MCNC: 1.7 MG/DL — SIGNIFICANT CHANGE UP (ref 1.6–2.6)
MCHC RBC-ENTMCNC: 29.8 PG — SIGNIFICANT CHANGE UP (ref 27–34)
MCHC RBC-ENTMCNC: 31 GM/DL — LOW (ref 32–36)
MCV RBC AUTO: 95.8 FL — SIGNIFICANT CHANGE UP (ref 80–100)
NRBC # BLD: 0 /100 WBCS — SIGNIFICANT CHANGE UP (ref 0–0)
PHOSPHATE SERPL-MCNC: 3 MG/DL — SIGNIFICANT CHANGE UP (ref 2.5–4.5)
PLATELET # BLD AUTO: 431 K/UL — HIGH (ref 150–400)
POTASSIUM SERPL-MCNC: 4.6 MMOL/L — SIGNIFICANT CHANGE UP (ref 3.5–5.3)
POTASSIUM SERPL-SCNC: 4.6 MMOL/L — SIGNIFICANT CHANGE UP (ref 3.5–5.3)
RBC # BLD: 2.89 M/UL — LOW (ref 3.8–5.2)
RBC # FLD: 15 % — HIGH (ref 10.3–14.5)
SODIUM SERPL-SCNC: 141 MMOL/L — SIGNIFICANT CHANGE UP (ref 135–145)
WBC # BLD: 11.35 K/UL — HIGH (ref 3.8–10.5)
WBC # FLD AUTO: 11.35 K/UL — HIGH (ref 3.8–10.5)

## 2019-02-10 RX ORDER — HEPARIN SODIUM 5000 [USP'U]/ML
5000 INJECTION INTRAVENOUS; SUBCUTANEOUS EVERY 8 HOURS
Qty: 0 | Refills: 0 | Status: DISCONTINUED | OUTPATIENT
Start: 2019-02-10 | End: 2019-02-11

## 2019-02-10 RX ORDER — PANTOPRAZOLE SODIUM 20 MG/1
1 TABLET, DELAYED RELEASE ORAL
Qty: 0 | Refills: 0 | DISCHARGE
Start: 2019-02-10

## 2019-02-10 RX ADMIN — SODIUM CHLORIDE 80 MILLILITER(S): 9 INJECTION INTRAMUSCULAR; INTRAVENOUS; SUBCUTANEOUS at 18:24

## 2019-02-10 RX ADMIN — QUETIAPINE FUMARATE 12.5 MILLIGRAM(S): 200 TABLET, FILM COATED ORAL at 11:44

## 2019-02-10 RX ADMIN — HEPARIN SODIUM 5000 UNIT(S): 5000 INJECTION INTRAVENOUS; SUBCUTANEOUS at 21:42

## 2019-02-10 RX ADMIN — LEVETIRACETAM 500 MILLIGRAM(S): 250 TABLET, FILM COATED ORAL at 21:42

## 2019-02-10 RX ADMIN — SODIUM CHLORIDE 80 MILLILITER(S): 9 INJECTION INTRAMUSCULAR; INTRAVENOUS; SUBCUTANEOUS at 10:51

## 2019-02-10 RX ADMIN — MEMANTINE HYDROCHLORIDE 10 MILLIGRAM(S): 10 TABLET ORAL at 11:45

## 2019-02-10 RX ADMIN — Medication 325 MILLIGRAM(S): at 11:44

## 2019-02-10 RX ADMIN — PANTOPRAZOLE SODIUM 40 MILLIGRAM(S): 20 TABLET, DELAYED RELEASE ORAL at 06:20

## 2019-02-10 RX ADMIN — Medication 81 MILLIGRAM(S): at 11:44

## 2019-02-10 RX ADMIN — LACOSAMIDE 200 MILLIGRAM(S): 50 TABLET ORAL at 18:23

## 2019-02-10 RX ADMIN — HEPARIN SODIUM 5000 UNIT(S): 5000 INJECTION INTRAVENOUS; SUBCUTANEOUS at 14:55

## 2019-02-10 RX ADMIN — LACOSAMIDE 200 MILLIGRAM(S): 50 TABLET ORAL at 06:20

## 2019-02-10 RX ADMIN — CEFTRIAXONE 100 GRAM(S): 500 INJECTION, POWDER, FOR SOLUTION INTRAMUSCULAR; INTRAVENOUS at 11:45

## 2019-02-10 RX ADMIN — LEVETIRACETAM 500 MILLIGRAM(S): 250 TABLET, FILM COATED ORAL at 10:47

## 2019-02-10 RX ADMIN — SODIUM CHLORIDE 80 MILLILITER(S): 9 INJECTION INTRAMUSCULAR; INTRAVENOUS; SUBCUTANEOUS at 21:42

## 2019-02-10 RX ADMIN — SODIUM CHLORIDE 80 MILLILITER(S): 9 INJECTION INTRAMUSCULAR; INTRAVENOUS; SUBCUTANEOUS at 06:20

## 2019-02-10 RX ADMIN — DONEPEZIL HYDROCHLORIDE 5 MILLIGRAM(S): 10 TABLET, FILM COATED ORAL at 21:42

## 2019-02-10 RX ADMIN — ATORVASTATIN CALCIUM 10 MILLIGRAM(S): 80 TABLET, FILM COATED ORAL at 21:42

## 2019-02-10 NOTE — PROGRESS NOTE ADULT - PROBLEM SELECTOR PLAN 3
Patient with ALE on admission, 1.68 (unknown baseline), blood work prior to admission at [facility] rehab 1.37 (2/5/19), likely pre-renal in the setting of sepsis  - creatinine still at 1.32 this AM (2/10) improved from admission   - will f/u urine lytes

## 2019-02-10 NOTE — PROGRESS NOTE ADULT - PROBLEM SELECTOR PLAN 4
Patient with history of seizures, most recent episode approx 2 weeks ago with associated fall resulting in hospitalization at [facility]. On Keppra and Vimpat outpatient. Keppra level noted to be low on outpatient bloodwork at [facility] rehab.   - Keppra levels normal today   - c/w home medications, Keppra and Vimpat

## 2019-02-10 NOTE — PROGRESS NOTE ADULT - PROBLEM SELECTOR PLAN 3
Patient with ALE on admission, 1.68 (unknown baseline), blood work prior to admission at [facility] rehab 1.37 (2/5/19), likely pre-renal in the setting of sepsis  - creatinine still at 1.39 this AM (2/8) improved from admission   - will f/u urine lytes Patient with ALE on admission, 1.68 (unknown baseline), blood work prior to admission at [facility] rehab 1.37 (2/5/19), likely pre-renal in the setting of sepsis  - creatinine still at 1.32 this AM (2/10) improved from admission   - will f/u urine lytes

## 2019-02-10 NOTE — PROGRESS NOTE ADULT - PROBLEM SELECTOR PLAN 4
Patient with history of seizures, most recent episode approx 2 weeks ago with associated fall resulting in hospitalization at [facility]. On Keppra and Vimpat outpatient. Keppra level noted to be low on outpatient bloodwork at [facility] rehab.   - Keppra levels normal today   - c/w home medications, Keppra and Vimpat

## 2019-02-10 NOTE — DISCHARGE NOTE ADULT - PATIENT PORTAL LINK FT
You can access the [facility] Patient Portal, offered by [facility], by registering with the following website: [email or web address]

## 2019-02-10 NOTE — PROGRESS NOTE ADULT - PROBLEM SELECTOR PLAN 1
Patient presenting with symptoms of increased lethargy, altered mental status, meeting SEPSIS criteria (febrile to 101.2 F, tachycardic to 96, Leukocytosis 14.07), negative lactate. CXR with no obvious infiltrates, UA + on admission, s/p Vanc/Zosyn in ED  -UA positive and UC negative   - blood cx NGTD   -s/p Vanc/Zosyn in ED x1, c/w Ceftriaxone 1 g daily  - CXR without any infiltrates   - unclear why WBC continues to elevate from 12 to 13.5 today down to 11.9 (2/8)  - Latosha does not want ID for now; pending gallium scan to look for another source of elevated WBC Patient presenting with symptoms of increased lethargy, altered mental status, meeting SEPSIS criteria (febrile to 101.2 F, tachycardic to 96, Leukocytosis 14.07), negative lactate. CXR with no obvious infiltrates, UA + on admission, s/p Vanc/Zosyn in ED  -UA positive and UC negative   - blood cx NGTD   -s/p Vanc/Zosyn in ED x1, c/w Ceftriaxone 1 g daily  - CXR without any infiltrates   - unclear why WBC continues to elevate from 12 to 13.5 today down to 11.35 (2/10)  - Latosha does not want ID for now; pending gallium scan to look for another source of elevated WBC

## 2019-02-10 NOTE — DISCHARGE NOTE ADULT - HOSPITAL COURSE
72 y/o F PMHx CAD s/p stents (>10 years ago), HTN, HLD, anemia, seizure disorder on Keppra, dementia (waxes and wanes) presenting from [facility] rehab with increased weakness, lethargy for the last few days. Of note, patient with recent admission to [facility] for 2 weeks (discharged on Friday 2/1). Patient was admitted then for seizure/fall with course c/b UTI. Patient discharged on Cefpodoxime. Since her discharge to [facility] rehab, patient noted to become more lethargic and spiked a temperature of 101F. Patient otherwise with no symptoms of chest pain, shortness of breath, cough, nausea/vomiting, diarrhea, changes in urinary habits. Remainder of ROS negative. Hospital course c/b elevated WBC of 12-13 without any source so patient received gallium scan to look for another infectious etiology as her CXR was negative and patient was actively being treated for UTI with ceftriaxone without decreased WBC. Patient's results of the gallium scan were ------. She completed 5 days of treatment for UTI and stable to discharge back to [facility] on her prior medications. 72 y/o F PMHx CAD s/p stents (>10 years ago), HTN, HLD, anemia, seizure disorder on Keppra, dementia (waxes and wanes) presenting from [facility] rehab with increased weakness, lethargy for the last few days. Of note, patient with recent admission to [facility] for 2 weeks (discharged on Friday 2/1). Patient was admitted then for seizure/fall with course c/b UTI. Patient discharged on Cefpodoxime. Since her discharge to [facility] rehab, patient noted to become more lethargic and spiked a temperature of 101F. Patient otherwise with no symptoms of chest pain, shortness of breath, cough, nausea/vomiting, diarrhea, changes in urinary habits. Remainder of ROS negative. Hospital course c/b low Hgb of 7.2 without active s/s of bleeding and received 1 unit pRBCs. Hospital course c/b elevated WBC of 12-13 without any source so patient received gallium scan to look for another infectious etiology as her CXR was negative and patient was actively being treated for UTI with ceftriaxone without decreased WBC. Patient's results of the gallium scan were negative for any source of infection. WBC trended down today to 8. She completed 5 days of treatment for UTI and stable to discharge back to [facility] on her prior medications.

## 2019-02-10 NOTE — DISCHARGE NOTE ADULT - PLAN OF CARE
to improve infection To treat your UTI adequately you were given 5 day course of ceftriaxone. You never had any complaints of pain with urination or urgency. to improve blood pressure You were previously diagnosed with high blood pressure. Please continue on your home medications at this time and follow up with your PMD for further surveillance and management of your high blood pressure. to improve seizure You have a history of seizure disorder and are continued on your current medications. Please follow up with your primary medical doctor. to improve cholesterol You were previously diagnosed with high cholesterol. Please continue on your home medications at this time and follow up with your PMD for further surveillance and management of your high cholesterol. to improve dehydration You came in dehydrated and received IV fluids. You had no symptoms of dehydration such as dizziness or lightheadedness. Please maintain adequate hydration. You have a history of dementia and were given haldol and seroquel which are 2 medications that help with calming someone down when they are confused. to ensure no active heart condition You came in with low hemolgobin level which is the amount of blood in your body. You were given 1 unit of blood and your blood count increased appropriately. To treat your UTI adequately you were given 5 day course of ceftriaxone. You never had any complaints of pain with urination or urgency. The gallium scan which is a scan to look for other sources of infection was negative. Your white count which is a measure of your infectious state trended down to normal levels and you are feeling well to go back to rehab.

## 2019-02-10 NOTE — PROGRESS NOTE ADULT - SUBJECTIVE AND OBJECTIVE BOX
Interventional, Pulmonary, Critical, Chest Special Procedures.    Pt was seen and fully examined by myself.     Time spent with patient in minutes:37    Patient is a 73y old  Female who presents with a chief complaint of fevers (10 Feb 2019 06:35)The patient appears comfortable and eupneic on RA, engaging and tangential in Citizen of the [place].    HPI:  74 y/o F PMHx CAD s/p stents (>10 years ago), HTN, HLD, anemia, seizure disorder on Keppra, dementia (waxes and wanes) presenting from [facility] rehab with increased weakness, lethargy for the last few days. Of note, patient with recent admission to [facility] for 2 weeks (discharged on Friday 2/1). Patient was admitted then for seizure/fall with course c/b UTI. Patient discharged on Cefpodoxime. Since her discharge to [facility] rehab, patient noted to become more lethargic and spiked a temperature of 101F. Patient otherwise with no symptoms of chest pain, shortness of breath, cough, nausea/vomiting, diarrhea, changes in urinary habits. Remainder of ROS negative.     ED VS: T 97.5-101.2F; HR 80-94; BP /79; RR 20; Sp02 95 RA  ED Course: Leukocytosis 14.07, Hgb 7.7 (normocytic), thrombocytosis 530, lactate 0.9, sCr 1.68 (unknown baseline), CT head negative, UA +, RVP negative, CXR performed (pending official read), preliminary read with no obvious infiltrates, s/p 2L NS, s/p Vanc/Zosyn (06 Feb 2019 02:56)    REVIEW OF SYSTEMS:  ROS reviewed below with positive findings marked (+) :  GEN:  fever, chills ENT: tracheostomy,   epistaxis,  sinusitis COR: +CAD, CHF, + HTN, dysrhythmia PUL: COPD, ILD, asthma, pneumonia GI: PEG, dysphagia, hemorrhage, other DAVONTE: kidney disease, electrolyte disorder HEM:  +anemia, thrombus, coagulopathy, cancer ENDO:  thyroid disease, diabetes mellitus CNS:  +dementia, stroke, +seizure, PSY:  depression, anxiety, other  PAST MEDICAL & SURGICAL HISTORY:  Dementia  Seizure  Anemia  HLD (hyperlipidemia)  HTN (hypertension)  CAD (coronary artery disease)  S/P partial gastrectomy  H/O thyroidectomy    FAMILY HISTORY:  Family history of hypertension (Mother)  Family history of diabetes mellitus (Mother)    SOCIAL HISTORY:      - Tobacco     - ETOH    Allergies    No Known Allergies    Intolerances      Vital Signs Last 24 Hrs  T(C): 36.8 (10 Feb 2019 08:40), Max: 37.1 (09 Feb 2019 20:39)  T(F): 98.3 (10 Feb 2019 08:40), Max: 98.8 (09 Feb 2019 20:39)  HR: 80 (10 Feb 2019 08:40) (77 - 87)  BP: 152/86 (10 Feb 2019 08:40) (150/82 - 158/85)  BP(mean): --  RR: 16 (10 Feb 2019 08:40) (16 - 19)  SpO2: 98% (10 Feb 2019 08:40) (97% - 98%)      PHYSICAL EXAM:  GENERAL:         comfortable,  - distress.  HEENT:            - trauma,  - icterus,  - injection,  - nasal discharge.  NECK:              - jugular venous distention, - thyromegaly.  LYMPH:           - lymphadenopathy, - masses.  RESP:               + clear,   - rales,   - rhonchi,   - wheezes.   COR:                S1S2   - gallops,  - rubs.  ABD:                bowel sounds,   soft, - tender, - distended.  EXT/MSC:         - cyanosis,  - clubbing,  - edema.    NEURO:             alert,   responds to stimuli.  DEVICES:  - DENTURES   +IV R / L     - ETUBE   -TRACH   -CTUBE  R / L      LABS:                          8.6    11.35 )-----------( 431      ( 10 Feb 2019 06:47 )             27.7     02-10    141  |  107  |  17  ----------------------------<  99  4.6   |  21<L>  |  1.32<H>    Ca    9.6      10 Feb 2019 06:47  Phos  3.0     02-10  Mg     1.7     02-10        RADIOLOGY & ADDITIONAL STUDIES (The following images were personally reviewed):

## 2019-02-10 NOTE — PROGRESS NOTE ADULT - PROBLEM SELECTOR PLAN 1
Patient presenting with symptoms of increased lethargy, altered mental status, meeting SEPSIS criteria (febrile to 101.2 F, tachycardic to 96, Leukocytosis 14.07), negative lactate. CXR with no obvious infiltrates, UA + on admission, s/p Vanc/Zosyn in ED  -UA positive and UC negative   - blood cx NGTD   -s/p Vanc/Zosyn in ED x1, c/w Ceftriaxone 1 g daily  - CXR without any infiltrates   - unclear why WBC continues to elevate from 12 to 13.5 today down to 11.35 (2/10)  - Latosha does not want ID for now; pending gallium scan to look for another source of elevated WBC

## 2019-02-10 NOTE — DISCHARGE NOTE ADULT - CARE PROVIDER_API CALL
Jose Reina)  Internal Medicine  [address] [address]  Phone: [phone]  Fax: [phone]  Follow Up Time:

## 2019-02-10 NOTE — DISCHARGE NOTE ADULT - CARE PLAN
Principal Discharge DX:	UTI (urinary tract infection)  Goal:	to improve infection  Assessment and plan of treatment:	To treat your UTI adequately you were given 5 day course of ceftriaxone. You never had any complaints of pain with urination or urgency.  Secondary Diagnosis:	HTN (hypertension)  Goal:	to improve blood pressure  Assessment and plan of treatment:	You were previously diagnosed with high blood pressure. Please continue on your home medications at this time and follow up with your PMD for further surveillance and management of your high blood pressure.  Secondary Diagnosis:	Seizure  Goal:	to improve seizure  Assessment and plan of treatment:	You have a history of seizure disorder and are continued on your current medications. Please follow up with your primary medical doctor.  Secondary Diagnosis:	HLD (hyperlipidemia)  Goal:	to improve cholesterol  Assessment and plan of treatment:	You were previously diagnosed with high cholesterol. Please continue on your home medications at this time and follow up with your PMD for further surveillance and management of your high cholesterol.  Secondary Diagnosis:	Dehydration  Goal:	to improve dehydration  Assessment and plan of treatment:	You came in dehydrated and received IV fluids. You had no symptoms of dehydration such as dizziness or lightheadedness. Please maintain adequate hydration.  Secondary Diagnosis:	Dementia  Assessment and plan of treatment:	You have a history of dementia and were given haldol and seroquel which are 2 medications that help with calming someone down when they are confused.  Secondary Diagnosis:	CAD (coronary artery disease)  Goal:	to ensure no active heart condition  Assessment and plan of treatment:	You came in with low hemolgobin level which is the amount of blood in your body. You were given 1 unit of blood and your blood count increased appropriately. Principal Discharge DX:	UTI (urinary tract infection)  Goal:	to improve infection  Assessment and plan of treatment:	To treat your UTI adequately you were given 5 day course of ceftriaxone. You never had any complaints of pain with urination or urgency. The gallium scan which is a scan to look for other sources of infection was negative. Your white count which is a measure of your infectious state trended down to normal levels and you are feeling well to go back to rehab.  Secondary Diagnosis:	HTN (hypertension)  Goal:	to improve blood pressure  Assessment and plan of treatment:	You were previously diagnosed with high blood pressure. Please continue on your home medications at this time and follow up with your PMD for further surveillance and management of your high blood pressure.  Secondary Diagnosis:	Seizure  Goal:	to improve seizure  Assessment and plan of treatment:	You have a history of seizure disorder and are continued on your current medications. Please follow up with your primary medical doctor.  Secondary Diagnosis:	HLD (hyperlipidemia)  Goal:	to improve cholesterol  Assessment and plan of treatment:	You were previously diagnosed with high cholesterol. Please continue on your home medications at this time and follow up with your PMD for further surveillance and management of your high cholesterol.  Secondary Diagnosis:	Dehydration  Goal:	to improve dehydration  Assessment and plan of treatment:	You came in dehydrated and received IV fluids. You had no symptoms of dehydration such as dizziness or lightheadedness. Please maintain adequate hydration.  Secondary Diagnosis:	Dementia  Assessment and plan of treatment:	You have a history of dementia and were given haldol and seroquel which are 2 medications that help with calming someone down when they are confused.  Secondary Diagnosis:	CAD (coronary artery disease)  Goal:	to ensure no active heart condition  Assessment and plan of treatment:	You came in with low hemolgobin level which is the amount of blood in your body. You were given 1 unit of blood and your blood count increased appropriately.

## 2019-02-11 VITALS
TEMPERATURE: 98 F | DIASTOLIC BLOOD PRESSURE: 84 MMHG | HEART RATE: 74 BPM | RESPIRATION RATE: 20 BRPM | OXYGEN SATURATION: 97 % | SYSTOLIC BLOOD PRESSURE: 125 MMHG

## 2019-02-11 LAB
ANION GAP SERPL CALC-SCNC: 14 MMOL/L — SIGNIFICANT CHANGE UP (ref 5–17)
BASOPHILS # BLD AUTO: 0.05 K/UL — SIGNIFICANT CHANGE UP (ref 0–0.2)
BASOPHILS NFR BLD AUTO: 0.6 % — SIGNIFICANT CHANGE UP (ref 0–2)
BUN SERPL-MCNC: 17 MG/DL — SIGNIFICANT CHANGE UP (ref 7–23)
CALCIUM SERPL-MCNC: 9.7 MG/DL — SIGNIFICANT CHANGE UP (ref 8.4–10.5)
CHLORIDE SERPL-SCNC: 105 MMOL/L — SIGNIFICANT CHANGE UP (ref 96–108)
CO2 SERPL-SCNC: 19 MMOL/L — LOW (ref 22–31)
CREAT SERPL-MCNC: 1.23 MG/DL — SIGNIFICANT CHANGE UP (ref 0.5–1.3)
CULTURE RESULTS: SIGNIFICANT CHANGE UP
CULTURE RESULTS: SIGNIFICANT CHANGE UP
EOSINOPHIL # BLD AUTO: 0.29 K/UL — SIGNIFICANT CHANGE UP (ref 0–0.5)
EOSINOPHIL NFR BLD AUTO: 3.6 % — SIGNIFICANT CHANGE UP (ref 0–6)
GLUCOSE SERPL-MCNC: 80 MG/DL — SIGNIFICANT CHANGE UP (ref 70–99)
HCT VFR BLD CALC: 27 % — LOW (ref 34.5–45)
HGB BLD-MCNC: 8 G/DL — LOW (ref 11.5–15.5)
IMM GRANULOCYTES NFR BLD AUTO: 0.5 % — SIGNIFICANT CHANGE UP (ref 0–1.5)
LYMPHOCYTES # BLD AUTO: 1.45 K/UL — SIGNIFICANT CHANGE UP (ref 1–3.3)
LYMPHOCYTES # BLD AUTO: 17.9 % — SIGNIFICANT CHANGE UP (ref 13–44)
MAGNESIUM SERPL-MCNC: 1.8 MG/DL — SIGNIFICANT CHANGE UP (ref 1.6–2.6)
MCHC RBC-ENTMCNC: 28.8 PG — SIGNIFICANT CHANGE UP (ref 27–34)
MCHC RBC-ENTMCNC: 29.6 GM/DL — LOW (ref 32–36)
MCV RBC AUTO: 97.1 FL — SIGNIFICANT CHANGE UP (ref 80–100)
MONOCYTES # BLD AUTO: 0.5 K/UL — SIGNIFICANT CHANGE UP (ref 0–0.9)
MONOCYTES NFR BLD AUTO: 6.2 % — SIGNIFICANT CHANGE UP (ref 2–14)
NEUTROPHILS # BLD AUTO: 5.77 K/UL — SIGNIFICANT CHANGE UP (ref 1.8–7.4)
NEUTROPHILS NFR BLD AUTO: 71.2 % — SIGNIFICANT CHANGE UP (ref 43–77)
PLATELET # BLD AUTO: 362 K/UL — SIGNIFICANT CHANGE UP (ref 150–400)
POTASSIUM SERPL-MCNC: 4.2 MMOL/L — SIGNIFICANT CHANGE UP (ref 3.5–5.3)
POTASSIUM SERPL-SCNC: 4.2 MMOL/L — SIGNIFICANT CHANGE UP (ref 3.5–5.3)
RBC # BLD: 2.78 M/UL — LOW (ref 3.8–5.2)
RBC # FLD: 14.8 % — HIGH (ref 10.3–14.5)
SODIUM SERPL-SCNC: 138 MMOL/L — SIGNIFICANT CHANGE UP (ref 135–145)
SPECIMEN SOURCE: SIGNIFICANT CHANGE UP
SPECIMEN SOURCE: SIGNIFICANT CHANGE UP
WBC # BLD: 8.1 K/UL — SIGNIFICANT CHANGE UP (ref 3.8–10.5)
WBC # FLD AUTO: 8.1 K/UL — SIGNIFICANT CHANGE UP (ref 3.8–10.5)

## 2019-02-11 RX ADMIN — HEPARIN SODIUM 5000 UNIT(S): 5000 INJECTION INTRAVENOUS; SUBCUTANEOUS at 06:11

## 2019-02-11 RX ADMIN — QUETIAPINE FUMARATE 12.5 MILLIGRAM(S): 200 TABLET, FILM COATED ORAL at 12:24

## 2019-02-11 RX ADMIN — LEVETIRACETAM 500 MILLIGRAM(S): 250 TABLET, FILM COATED ORAL at 10:05

## 2019-02-11 RX ADMIN — MEMANTINE HYDROCHLORIDE 10 MILLIGRAM(S): 10 TABLET ORAL at 12:23

## 2019-02-11 RX ADMIN — PANTOPRAZOLE SODIUM 40 MILLIGRAM(S): 20 TABLET, DELAYED RELEASE ORAL at 06:11

## 2019-02-11 RX ADMIN — LACOSAMIDE 200 MILLIGRAM(S): 50 TABLET ORAL at 06:11

## 2019-02-11 RX ADMIN — Medication 81 MILLIGRAM(S): at 12:23

## 2019-02-11 RX ADMIN — Medication 325 MILLIGRAM(S): at 12:23

## 2019-02-11 RX ADMIN — HEPARIN SODIUM 5000 UNIT(S): 5000 INJECTION INTRAVENOUS; SUBCUTANEOUS at 14:01

## 2019-02-11 NOTE — PROGRESS NOTE ADULT - PROVIDER SPECIALTY LIST ADULT
Internal Medicine
Pulmonology
Rehab Medicine
Internal Medicine

## 2019-02-11 NOTE — PROGRESS NOTE ADULT - PROBLEM SELECTOR PLAN 2
Plan as per above for Problem #1    #Dementia  -c/w home medication, Donepezil.

## 2019-02-11 NOTE — PROGRESS NOTE ADULT - PROBLEM SELECTOR PLAN 4
Patient with history of seizures, most recent episode approx 2 weeks ago with associated fall resulting in hospitalization at [facility]. On Keppra and Vimpat outpatient. Keppra level noted to be low on outpatient bloodwork at [facility] rehab.   - Keppra levels normal today   - c/w home medications, Keppra and Vimpat

## 2019-02-11 NOTE — PROGRESS NOTE ADULT - SUBJECTIVE AND OBJECTIVE BOX
OVERNIGHT EVENTS: JERO     SUBJECTIVE: no complaints  CV stable    Vital Signs Last 12 Hrs  T(F): 98.3 (02-10-19 @ 04:49), Max: 98.8 (02-09-19 @ 20:39)  HR: 77 (02-10-19 @ 04:49) (77 - 86)  BP: 158/85 (02-10-19 @ 04:49) (150/82 - 158/85)  RR: 18 (02-10-19 @ 04:49) (18 - 19)  SpO2: 98% (02-10-19 @ 04:49) (97% - 98%)  I&O's Summary    08 Feb 2019 07:01  -  09 Feb 2019 07:00  --------------------------------------------------------  IN: 960 mL / OUT: 0 mL / NET: 960 mL        PHYSICAL EXAM:  Constitutional: pleasantly confused female in NAD, comfortable in bed.  HEENT: NC/AT, PERRLA, EOMI, no conjunctival pallor or scleral icterus, MMM  Neck: Supple, no JVD  Respiratory: Normal rate, rhythm, depth, effort. CTAB. No w/r/r.   Cardiovascular: RRR, normal S1 and S2, no m/r/g.   Gastrointestinal: +BS, soft NTND, no guarding or rebound tenderness, no palpable masses   Extremities: wwp; no cyanosis, clubbing or edema.   Vascular: Pulses equal and strong throughout.   Neurological: AAOx1 (self), no CN deficits, strength and sensation intact throughout.   Skin: No gross skin abnormalities or rashes      Complete Blood Count + Automated Diff in AM (02.11.19 @ 07:05)    WBC Count: 8.10 K/uL    RBC Count: 2.78 M/uL    Hemoglobin: 8.0 g/dL    Hematocrit: 27.0 %    Mean Cell Volume: 97.1 fl    Mean Cell Hemoglobin: 28.8 pg    Mean Cell Hemoglobin Conc: 29.6 gm/dL    Red Cell Distrib Width: 14.8 %    Platelet Count - Automated: 362 K/uL    Auto Neutrophil #: 5.77 K/uL    Auto Lymphocyte #: 1.45 K/uL    Auto Monocyte #: 0.50 K/uL    Auto Eosinophil #: 0.29 K/uL    Auto Basophil #: 0.05 K/uL    Auto Neutrophil %: 71.2: Differential percentages must be correlated with absolute numbers for  clinical significance. %    Auto Lymphocyte %: 17.9 %    Auto Monocyte %: 6.2 %    Auto Eosinophil %: 3.6 %    Auto Basophil %: 0.6 %    Auto Immature Granulocyte %: 0.5 %                            9.4    12.03 )-----------( 391      ( 09 Feb 2019 08:33 )             30.8     02-09    141  |  110<H>  |  22  ----------------------------<  99  5.0   |  18<L>  |  1.39<H>    Ca    9.6      09 Feb 2019 08:33  Mg     1.9     02-09            RADIOLOGY & ADDITIONAL TESTS:    MEDICATIONS  (STANDING):  aspirin enteric coated 81 milliGRAM(s) Oral daily  atorvastatin 10 milliGRAM(s) Oral at bedtime  cefTRIAXone   IVPB 1 Gram(s) IV Intermittent every 24 hours  donepezil 5 milliGRAM(s) Oral at bedtime  ferrous    sulfate 325 milliGRAM(s) Oral daily  lacosamide 200 milliGRAM(s) Oral two times a day  levETIRAcetam 500 milliGRAM(s) Oral two times a day  memantine 10 milliGRAM(s) Oral daily  pantoprazole    Tablet 40 milliGRAM(s) Oral before breakfast  QUEtiapine 12.5 milliGRAM(s) Oral daily  sodium chloride 0.9%. 1000 milliLiter(s) (80 mL/Hr) IV Continuous <Continuous>    MEDICATIONS  (PRN):

## 2019-02-11 NOTE — PROGRESS NOTE ADULT - PROBLEM SELECTOR PLAN 5
Continue home medication, Lipitor 10 mg qHS.
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
## 2019-02-11 NOTE — PROGRESS NOTE ADULT - ATTENDING COMMENTS
Dementia  OOB  Supp rx  cont Ceftriaxone--monitor WBC  f/u cult  if WBC cont to rise will broaden coverage--zosyn
Sepsis--cont ab  Seizure--cont present medication.  OOB  PT  Anemia--chronic
Sepsis--cont ab  Seizure--cont present medication.  OOB  PT  Anemia--chronic
WBC stable  Po intake--monitor  OOB  CV stable
elevated WBC ga p  CV stable

## 2019-02-11 NOTE — PROGRESS NOTE ADULT - PROBLEM SELECTOR PLAN 9
F: None (s/p 2L NS)   E: Replete electrolytes PRN, Goal: K>4, Mg>2  N: Dysphagia screen, then can resume DASH/TLC diet    DVT Ppx: SCDs  CODE: DNR (not DNI), MOLST in patient chart  Dispo: Admit to [facility].

## 2019-02-11 NOTE — PROGRESS NOTE ADULT - ASSESSMENT
72 y/o F PMHx CAD s/p stents (>10 years ago), HTN, HLD, anemia, seizure disorder on Keppra, dementia (waxes and wanes) presenting from [facility] with increased weakness, lethargy for the last few days, admitted for sepsis 2/2 UTI.
74 y/o F PMHx CAD s/p stents (>10 years ago), HTN, HLD, anemia, seizure disorder on Keppra, dementia (waxes and wanes) presenting from [facility] rehab with increased weakness, lethargy for the last few days, admitted for sepsis 2/2 UTI.     Problem/Plan - 1:  ·  Problem: Sepsis.  Plan: Patient presenting with symptoms of increased lethargy, altered mental status, meeting SEPSIS criteria (febrile to 101.2 F, tachycardic to 96, Leukocytosis 14.07), negative lactate. CXR with no obvious infiltrates, UA + on admission, s/p Vanc/Zosyn in ED  -UA positive and UC negative   - blood cx NGTD   -s/p Vanc/Zosyn in ED x1, c/w Ceftriaxone 1 g daily  - CXR without any infiltrates   - unclear why WBC continues to elevate from 12 to 13.5 today down to 11.35 (2/10)  - Latosha does not want ID for now; pending gallium scan to look for another source of elevated WBC.     Problem/Plan - 2:  ·  Problem: UTI (urinary tract infection).  Plan: Plan as per above for Problem #1    #Dementia  -c/w home medication, Donepezil.     Problem/Plan - 3:  ·  Problem: ALE (acute kidney injury).  Plan: Patient with ALE on admission, 1.68 (unknown baseline), blood work prior to admission at [facility] rehab 1.37 (2/5/19), likely pre-renal in the setting of sepsis  - creatinine still at 1.32 this AM (2/10) improved from admission   - will f/u urine lytes.     Problem/Plan - 4:  ·  Problem: Seizure.  Plan: Patient with history of seizures, most recent episode approx 2 weeks ago with associated fall resulting in hospitalization at [facility]. On Keppra and Vimpat outpatient. Keppra level noted to be low on outpatient bloodwork at [facility] rehab.   - Keppra levels normal today   - c/w home medications, Keppra and Vimpat.
74 y/o F PMHx CAD s/p stents (>10 years ago), HTN, HLD, anemia, seizure disorder on Keppra, dementia (waxes and wanes) presenting from [facility] with increased weakness, lethargy for the last few days, admitted for sepsis 2/2 UTI.
74 y/o F PMHx CAD s/p stents (>10 years ago), HTN, HLD, anemia, seizure disorder on Keppra, dementia (waxes and wanes) presenting from [facility] with increased weakness, lethargy for the last few days, admitted for sepsis 2/2 UTI.
ASSESSMENT/PLAN    1)  Altered mental status  2)  Sepsis  3)  Hypertension  4)  Dementia    Satisfactory SpO2  Bronchodilators:  Atrovent/ albuterol q 4 – 6 hours as needed  ID/Antibiotics: Ceftriaxone, follow up cultures, follow up Gallium scan  Cardiac/HTN: BP satisfactory  GI: Rx/ prophylaxis c PPI/H2B  Heme: Rx/VT prophylaxis   Discussed with
ASSESSMENT/PLAN    1)  Altered mental status  2)  Sepsis  3)  Hypertension  4)  Dementia    Satisfactory SpO2  Bronchodilators:  Atrovent/ albuterol q 4 – 6 hours as needed  ID/Antibiotics: Ceftriaxone, follow up cultures, follow up Gallium scan  Cardiac/HTN: BP satisfactory  GI: Rx/ prophylaxis c PPI/H2B  Heme: Rx/VT prophylaxis must be given, discussed c housestaff  Aspiration precautions at all times  Gallium Scan today  Discussed with housestaff
74 y/o F PMHx CAD s/p stents (>10 years ago), HTN, HLD, anemia, seizure disorder on Keppra, dementia (waxes and wanes) presenting from [facility] with increased weakness, lethargy for the last few days, admitted for sepsis 2/2 UTI.
74 y/o F PMHx CAD s/p stents (>10 years ago), HTN, HLD, anemia, seizure disorder on Keppra, dementia (waxes and wanes) presenting from [facility] with increased weakness, lethargy for the last few days, admitted for sepsis 2/2 UTI.
72 y/o F PMHx CAD s/p stents (>10 years ago), HTN, HLD, anemia, seizure disorder on Keppra, dementia (waxes and wanes) presenting from [facility] with increased weakness, lethargy for the last few days, admitted for sepsis 2/2 UTI.

## 2019-02-11 NOTE — PROGRESS NOTE ADULT - PROBLEM SELECTOR PLAN 3
Patient with ALE on admission, 1.68 (unknown baseline), blood work prior to admission at [facility] rehab 1.37 (2/5/19), likely pre-renal in the setting of sepsis  - creatinine still at 1.32 this AM (2/10) improved from admission   - will f/u urine lytes

## 2019-02-11 NOTE — PROGRESS NOTE ADULT - PROBLEM SELECTOR PROBLEM 3
ALE (acute kidney injury)

## 2019-02-11 NOTE — PROGRESS NOTE ADULT - SUBJECTIVE AND OBJECTIVE BOX
Physical Medicine and Rehabilitation Progress Note:    Patient is a 73y old  Female who presents with a chief complaint of fevers (10 Feb 2019 21:18)      HPI:  74 y/o F PMHx CAD s/p stents (>10 years ago), HTN, HLD, anemia, seizure disorder on Keppra, dementia (waxes and wanes) presenting from [facility] rehab with increased weakness, lethargy for the last few days. Of note, patient with recent admission to [facility] for 2 weeks (discharged on Friday 2/1). Patient was admitted then for seizure/fall with course c/b UTI. Patient discharged on Cefpodoxime. Since her discharge to [facility] rehab, patient noted to become more lethargic and spiked a temperature of 101F. Patient otherwise with no symptoms of chest pain, shortness of breath, cough, nausea/vomiting, diarrhea, changes in urinary habits. Remainder of ROS negative.     ED VS: T 97.5-101.2F; HR 80-94; BP /79; RR 20; Sp02 95 RA  ED Course: Leukocytosis 14.07, Hgb 7.7 (normocytic), thrombocytosis 530, lactate 0.9, sCr 1.68 (unknown baseline), CT head negative, UA +, RVP negative, CXR performed (pending official read), preliminary read with no obvious infiltrates, s/p 2L NS, s/p Vanc/Zosyn (06 Feb 2019 02:56)                            8.0    8.10  )-----------( 362      ( 11 Feb 2019 07:05 )             27.0       02-11    138  |  105  |  17  ----------------------------<  80  4.2   |  19<L>  |  1.23    Ca    9.7      11 Feb 2019 07:05  Phos  3.0     02-10  Mg     1.8     02-11      Vital Signs Last 24 Hrs  T(C): 36.6 (11 Feb 2019 09:21), Max: 36.9 (10 Feb 2019 20:46)  T(F): 97.8 (11 Feb 2019 09:21), Max: 98.5 (10 Feb 2019 20:46)  HR: 69 (11 Feb 2019 09:21) (69 - 81)  BP: 154/83 (11 Feb 2019 09:21) (130/81 - 154/83)  BP(mean): --  RR: 20 (11 Feb 2019 09:21) (16 - 20)  SpO2: 96% (11 Feb 2019 09:21) (95% - 98%)    MEDICATIONS  (STANDING):  aspirin enteric coated 81 milliGRAM(s) Oral daily  atorvastatin 10 milliGRAM(s) Oral at bedtime  donepezil 5 milliGRAM(s) Oral at bedtime  ferrous    sulfate 325 milliGRAM(s) Oral daily  heparin  Injectable 5000 Unit(s) SubCutaneous every 8 hours  lacosamide 200 milliGRAM(s) Oral two times a day  levETIRAcetam 500 milliGRAM(s) Oral two times a day  memantine 10 milliGRAM(s) Oral daily  pantoprazole    Tablet 40 milliGRAM(s) Oral before breakfast  QUEtiapine 12.5 milliGRAM(s) Oral daily    MEDICATIONS  (PRN):    Currently Undergoing Physical Therapy at bedside.    Functional Status Assessment:    Previous Level of Function:     · Ambulation Skills	needed assist; needs device; RW	  · Transfer Skills	needed assist	  · ADL Skills	needed assist	  · Additional Comments	Pt. is an unreliable historian, unable to provide further background information.  Pt. is currently admitted from [facility].	    Cognitive Status Examination:   · Orientation	person	  · Level of Consciousness	confused	  · Follows Commands and Answers Questions	75% of the time	  · Personal Safety and Judgment	impaired	    Range of Motion Exam:   · Range of Motion Examination	no ROM deficits were identified	    Manual Muscle Testing:   · Manual Muscle Testing Results	~3+/5 througout by functional assessment against gravity	    Bed Mobility: Rolling/Turning:     · Level of [place]	moderate assist (50% patients effort)	  · Physical Assist/Nonphysical Assist	1 person assist	  · Assistive Device	bed rails	    Bed Mobility: Scooting/Bridging:     · Level of [place]	maximum assist (25% patients effort)	  · Physical Assist/Nonphysical Assist	1 person assist	    Bed Mobility: Sit to Supine:     · Level of Preble	maximum assist (25% patients effort)	  · Physical Assist/Nonphysical Assist	1 person assist	    Bed Mobility: Supine to Sit:     · Level of Pre[place]	maximum assist (25% patients effort)	  · Physical Assist/Nonphysical Assist	1 person assist	    Transfer: Sit to Stand:     · Level of Pre[place]	maximum assist (25% patients effort)	  · Physical Assist/Nonphysical Assist	1 person assist	  · Weight-Bearing Restrictions	weight-bearing as tolerated	  · Assistive Device	rolling walker	    Transfer: Stand to Sit:     · Level of Pre[place]	maximum assist (25% patients effort)	  · Physical Assist/Nonphysical Assist	1 person assist	  · Weight-Bearing Restrictions	weight-bearing as tolerated	  · Assistive Device	rolling walker	    Sit/Stand Transfer Safety Analysis:     · Transfer Safety Concerns Noted	decreased weight-shifting ability	  · Impairments Contributing to Impaired Transfers	cognition; impaired coordination; decreased strength	    Gait Skills:     · Level of [place]	unable to perform	    Balance Skills Assessment:     · Sitting Balance: Static	fair plus	  · Sitting Balance: Dynamic	fair balance	  · Sit-to-Stand Balance	poor balance	  · Standing Balance: Static	poor balance	  · Standing Balance: Dynamic	TBA	  · Identified Impairments Contributing to Balance Disturbance	decreased strength	    Sensory Examination:   Sensory Examination:    Grossly Intact:   · Gross Sensory Examination	Grossly Intact; to light touch throughout	      Clinical Impressions:   · Criteria for Skilled Therapeutic Interventions	impairments found; functional limitations in following categories	  · Impairments Found (describe specific impairments)	aerobic capacity/endurance; gait, locomotion, and balance	  · Functional Limitations in Following Categories (describe specific limitations)	self-care; home management; community/leisure	  · Risk Reduction/Prevention (Describe Specific Areas of risk reduction/prevention)	risk factors	  · Risk Areas	fall	  · Rehab Potential	good, to achieve stated therapy goals	  · Therapy Frequency	3-5x/week	  · Predicted Duration of Therapy Intervention (days/wks)	Pt. would benefit from further PT follow up to improve strength, transfers, endurance, balance, assess ambulation.	  · Anticipated Equipment Needs at Discharge	rolling walker (5 inch wheels)	        PM&R Impression: as above    Disposition Plan Recommendations: return to [facility] subacute rehab

## 2019-02-11 NOTE — PROGRESS NOTE ADULT - SUBJECTIVE AND OBJECTIVE BOX
CC/ HPI Patient is a 73 year old female with dementia, CAD s/p stents, hypertension, transferred from [facility] with weakness and lethargy for the last few days, admitted for urosepsis,  seen this morning the patient denies respiratory complaint.      PAST MEDICAL & SURGICAL HISTORY:  Dementia  Seizure  Anemia  HLD (hyperlipidemia)  HTN (hypertension)  CAD (coronary artery disease)  S/P partial gastrectomy  H/O thyroidectomy    SOCHX:   -  alcohol    FMHX: FA/MO  - contributory     ROS reviewed below with positive findings marked (+) :  GEN:  fever, chills ENT: tracheostomy,   epistaxis,  sinusitis COR: +CAD, CHF, + HTN, dysrhythmia PUL: COPD, ILD, asthma, pneumonia GI: PEG, dysphagia, hemorrhage, other DAVONTE: kidney disease, electrolyte disorder HEM:  +anemia, thrombus, coagulopathy, cancer ENDO:  thyroid disease, diabetes mellitus CNS:  +dementia, stroke, +seizure, PSY:  depression, anxiety, other          MEDICATIONS  (STANDING):  aspirin enteric coated 81 milliGRAM(s) Oral daily  atorvastatin 10 milliGRAM(s) Oral at bedtime  donepezil 5 milliGRAM(s) Oral at bedtime  ferrous    sulfate 325 milliGRAM(s) Oral daily  heparin  Injectable 5000 Unit(s) SubCutaneous every 8 hours  lacosamide 200 milliGRAM(s) Oral two times a day  levETIRAcetam 500 milliGRAM(s) Oral two times a day  memantine 10 milliGRAM(s) Oral daily  pantoprazole    Tablet 40 milliGRAM(s) Oral before breakfast  QUEtiapine 12.5 milliGRAM(s) Oral daily    MEDICATIONS  (PRN):      Vital Signs Last 24 Hrs  T(C): 36.7 (11 Feb 2019 16:08), Max: 36.9 (10 Feb 2019 20:46)  T(F): 98.1 (11 Feb 2019 16:08), Max: 98.5 (10 Feb 2019 20:46)  HR: 74 (11 Feb 2019 16:08) (69 - 78)  BP: 125/84 (11 Feb 2019 16:08) (125/84 - 154/83)  RR: 20 (11 Feb 2019 16:08) (16 - 20)  SpO2: 97% (11 Feb 2019 16:08) (95% - 98%)    GENERAL:         comfortable,  - distress.  HEENT:            - trauma,  - icterus,  - injection,  - nasal discharge.  NECK:              - jugular venous distention, - thyromegaly.  LYMPH:           - lymphadenopathy, - masses.  RESP:              + crackles,   - rhonchi,   - wheezes.   COR:                S1S2   - gallops,  - rubs.  ABD:                bowel sounds,   soft, - tender, - distended.  EXT/MSC:         - cyanosis,  - clubbing,  - edema.    NEURO:             alert,   responds to stimuli.                            8.0    8.10  )-----------( 362      ( 11 Feb 2019 07:05 )             27.0     02-11    138  |  105  |  17  ----------------------------<  80  4.2   |  19<L>  |  1.23    Ca    9.7      11 Feb 2019 07:05  Phos  3.0     02-10  Mg     1.8     02-11      X-ray Chest 1 View AP/PA (02.06.19)  Frontal view of the chest demonstrates low lung volumes.  Midline trachea. Normal heart size. No consolidation. No pleural effusion.          ASSESSMENT/PLAN    1)  Altered mental status  2)  Sepsis  3)  Hypertension  4)  Dementia    Satisfactory SpO2  ID/Antibiotics: status post Ceftriaxone, follow up gallium scan  Leukocytosis improved, follow up cultures  Bronchodilators:  Atrovent/ albuterol q 4 – 6 hours as needed  Cardiac/HTN: BP satisfactory  GI: Rx/ prophylaxis c PPI/H2B  Heme: Rx/VT prophylaxis   Discussed with Dr. De La Torre.

## 2019-02-11 NOTE — PROGRESS NOTE ADULT - PROBLEM SELECTOR PLAN 10
Transition of care performed with sharing of clinical summary. Plan; 1) PCP Contacted on Admission: (Y/N) --> Name & Phone #: Dr Zurdo Ruvalcaba (confirm phone number with patient daughter in AM. Daughter - Lorrie Schmitt Ph: [phone])  2) Date of Contact with PCP:  3) PCP Contacted at Discharge: (Y/N)  4) Summary of Handoff Given to PCP:   5) Post-Discharge Appointment Date and Location:
Transition of care performed with sharing of clinical summary. Plan; 1) PCP Contacted on Admission: (Y/N) --> Name & Phone #: Dr Zurdo Ruvalcaba (confirm phone number with patient daughter in AM. Daughter - Lorrie Schmitt Ph: [phone])  2) Date of Contact with PCP:  3) PCP Contacted at Discharge: (Y/N)  4) Summary of Handoff Given to PCP:   5) Post-Discharge Appointment Date and Location:
Transition of care performed with sharing of clinical summary. Plan; 1) PCP Contacted on Admission: (Y/N) --> Name & Phone #: Dr Zurdo Ruvalcaba (confirm phone number with patient daughter in AM. Daughter - Lorrie Schmitt Ph: [phone])  2) Date of Contact with PCP:  3) PCP Contacted at Discharge: (Y/N)  4) Summary of Handoff Given to PCP:   5) Post-Discharge Appointment Date and Location:
Transition of care performed with sharing of clinical summary. Plan; 1) PCP Contacted on Admission: (Y/N) --> Name & Phone #: Dr Zurdo Ruvalcaba (confirm phone number with patient daughter in AM. Daughter - Lorrie Schmitt Ph: [phone])  2) Date of Contact with PCP:  3) PCP Contacted at Discharge: (Y/N)  4) Summary of Handoff Given to PCP:   5) Post-Discharge Appointment Date and Location:
Transition of care performed with sharing of clinical summary. Plan; 1) PCP Contacted on Admission: (Y/N) --> Name & Phone #: Dr Zurdo Ruvalcaba (confirm phone number with patient daughter in AM. Daughter - Lorrie Schmitt Ph: [phone])  2) Date of Contact with PCP:  3) PCP Contacted at Discharge: (Y/N)  4) Summary of Handoff Given to PCP:   5) Post-Discharge Appointment Date and Location:
Transition of care performed with sharing of clinical summary. Plan; 1) PCP Contacted on Admission: (Y/N) --> Name & Phone #: Dr Zurdo Ruvalcaba (confirm phone number with patient daughter in AM. Daughter - Lorrie Schmitt Ph: [phone])  2) Date of Contact with PCP:  3) PCP Contacted at Discharge: (Y/N)  4) Summary of Handoff Given to PCP:   5) Post-Discharge Appointment Date and Location:
Transition of care performed with sharing of clinical summary. Plan; 1) PCP Contacted on Admission: (Y/N) --> Name & Phone #: Dr Zurdo Ruvalcaba (confirm phone number with patient daughter in AM. Daughter - Lorrie Schmitt Ph: [phone])  2) Date of Contact with PCP:  3) PCP Contacted at Discharge: (Y/N)  4) Summary of Handoff Given to PCP:   5) Post-Discharge Appointment Date and Location:
Transition of care performed with sharing of clinical summary. Plan; 1) PCP Contacted on Admission: (Y/N) --> Name & Phone #: Dr Zurdo Ruvalcaba (confirm phone number with patient daughter in AM. Daughter - Lorrie Schmitt Ph: [phone])  2) Date of Contact with PCP:  3) PCP Contacted at Discharge: (Y/N)  4) Summary of Handoff Given to PCP:   5) Post-Discharge Appointment Date and Location:

## 2019-02-14 DIAGNOSIS — F17.210 NICOTINE DEPENDENCE, CIGARETTES, UNCOMPLICATED: ICD-10-CM

## 2019-02-14 DIAGNOSIS — M10.9 GOUT, UNSPECIFIED: ICD-10-CM

## 2019-02-14 DIAGNOSIS — I10 ESSENTIAL (PRIMARY) HYPERTENSION: ICD-10-CM

## 2019-02-14 DIAGNOSIS — F05 DELIRIUM DUE TO KNOWN PHYSIOLOGICAL CONDITION: ICD-10-CM

## 2019-02-14 DIAGNOSIS — M62.838 OTHER MUSCLE SPASM: ICD-10-CM

## 2019-02-14 DIAGNOSIS — I25.10 ATHEROSCLEROTIC HEART DISEASE OF NATIVE CORONARY ARTERY WITHOUT ANGINA PECTORIS: ICD-10-CM

## 2019-02-14 DIAGNOSIS — K21.9 GASTRO-ESOPHAGEAL REFLUX DISEASE WITHOUT ESOPHAGITIS: ICD-10-CM

## 2019-02-14 DIAGNOSIS — Z95.5 PRESENCE OF CORONARY ANGIOPLASTY IMPLANT AND GRAFT: ICD-10-CM

## 2019-02-14 DIAGNOSIS — Z83.3 FAMILY HISTORY OF DIABETES MELLITUS: ICD-10-CM

## 2019-02-14 DIAGNOSIS — F03.90 UNSPECIFIED DEMENTIA WITHOUT BEHAVIORAL DISTURBANCE: ICD-10-CM

## 2019-02-14 DIAGNOSIS — N17.9 ACUTE KIDNEY FAILURE, UNSPECIFIED: ICD-10-CM

## 2019-02-14 DIAGNOSIS — D50.9 IRON DEFICIENCY ANEMIA, UNSPECIFIED: ICD-10-CM

## 2019-02-14 DIAGNOSIS — E86.0 DEHYDRATION: ICD-10-CM

## 2019-02-14 DIAGNOSIS — Z82.49 FAMILY HISTORY OF ISCHEMIC HEART DISEASE AND OTHER DISEASES OF THE CIRCULATORY SYSTEM: ICD-10-CM

## 2019-02-14 DIAGNOSIS — A41.9 SEPSIS, UNSPECIFIED ORGANISM: ICD-10-CM

## 2019-02-14 DIAGNOSIS — Z66 DO NOT RESUSCITATE: ICD-10-CM

## 2019-02-14 DIAGNOSIS — Z98.890 OTHER SPECIFIED POSTPROCEDURAL STATES: ICD-10-CM

## 2019-02-14 DIAGNOSIS — E78.5 HYPERLIPIDEMIA, UNSPECIFIED: ICD-10-CM

## 2019-02-14 DIAGNOSIS — G40.909 EPILEPSY, UNSPECIFIED, NOT INTRACTABLE, WITHOUT STATUS EPILEPTICUS: ICD-10-CM

## 2019-02-14 DIAGNOSIS — Z79.82 LONG TERM (CURRENT) USE OF ASPIRIN: ICD-10-CM

## 2019-02-14 DIAGNOSIS — N39.0 URINARY TRACT INFECTION, SITE NOT SPECIFIED: ICD-10-CM

## 2019-02-14 DIAGNOSIS — E89.0 POSTPROCEDURAL HYPOTHYROIDISM: ICD-10-CM

## 2019-02-26 PROCEDURE — P9016: CPT

## 2019-02-26 PROCEDURE — 97161 PT EVAL LOW COMPLEX 20 MIN: CPT

## 2019-02-26 PROCEDURE — 87486 CHLMYD PNEUM DNA AMP PROBE: CPT

## 2019-02-26 PROCEDURE — 93005 ELECTROCARDIOGRAM TRACING: CPT

## 2019-02-26 PROCEDURE — 80053 COMPREHEN METABOLIC PANEL: CPT

## 2019-02-26 PROCEDURE — 80048 BASIC METABOLIC PNL TOTAL CA: CPT

## 2019-02-26 PROCEDURE — 70450 CT HEAD/BRAIN W/O DYE: CPT

## 2019-02-26 PROCEDURE — 83540 ASSAY OF IRON: CPT

## 2019-02-26 PROCEDURE — 86923 COMPATIBILITY TEST ELECTRIC: CPT

## 2019-02-26 PROCEDURE — 87086 URINE CULTURE/COLONY COUNT: CPT

## 2019-02-26 PROCEDURE — 86850 RBC ANTIBODY SCREEN: CPT

## 2019-02-26 PROCEDURE — 84100 ASSAY OF PHOSPHORUS: CPT

## 2019-02-26 PROCEDURE — 87633 RESP VIRUS 12-25 TARGETS: CPT

## 2019-02-26 PROCEDURE — 83690 ASSAY OF LIPASE: CPT

## 2019-02-26 PROCEDURE — 78802 RP LOCLZJ TUM WHBDY 1 D IMG: CPT

## 2019-02-26 PROCEDURE — 36430 TRANSFUSION BLD/BLD COMPNT: CPT

## 2019-02-26 PROCEDURE — 82728 ASSAY OF FERRITIN: CPT

## 2019-02-26 PROCEDURE — 83735 ASSAY OF MAGNESIUM: CPT

## 2019-02-26 PROCEDURE — 96375 TX/PRO/DX INJ NEW DRUG ADDON: CPT

## 2019-02-26 PROCEDURE — 85027 COMPLETE CBC AUTOMATED: CPT

## 2019-02-26 PROCEDURE — 84466 ASSAY OF TRANSFERRIN: CPT

## 2019-02-26 PROCEDURE — 85025 COMPLETE CBC W/AUTO DIFF WBC: CPT

## 2019-02-26 PROCEDURE — 87040 BLOOD CULTURE FOR BACTERIA: CPT

## 2019-02-26 PROCEDURE — 83550 IRON BINDING TEST: CPT

## 2019-02-26 PROCEDURE — A9556: CPT

## 2019-02-26 PROCEDURE — 96374 THER/PROPH/DIAG INJ IV PUSH: CPT

## 2019-02-26 PROCEDURE — 80177 DRUG SCRN QUAN LEVETIRACETAM: CPT

## 2019-02-26 PROCEDURE — 81001 URINALYSIS AUTO W/SCOPE: CPT

## 2019-02-26 PROCEDURE — 87581 M.PNEUMON DNA AMP PROBE: CPT

## 2019-02-26 PROCEDURE — 36415 COLL VENOUS BLD VENIPUNCTURE: CPT

## 2019-02-26 PROCEDURE — 71045 X-RAY EXAM CHEST 1 VIEW: CPT

## 2019-02-26 PROCEDURE — 86900 BLOOD TYPING SEROLOGIC ABO: CPT

## 2019-02-26 PROCEDURE — 99285 EMERGENCY DEPT VISIT HI MDM: CPT | Mod: 25

## 2019-02-26 PROCEDURE — 86901 BLOOD TYPING SEROLOGIC RH(D): CPT

## 2019-02-26 PROCEDURE — 83605 ASSAY OF LACTIC ACID: CPT

## 2019-02-26 PROCEDURE — 87798 DETECT AGENT NOS DNA AMP: CPT

## 2019-04-24 ENCOUNTER — INPATIENT (INPATIENT)
Facility: HOSPITAL | Age: 74
LOS: 6 days | Discharge: NURSING, REHAB OR LONG-TERM CARE | DRG: 871 | End: 2019-05-01
Attending: INTERNAL MEDICINE | Admitting: INTERNAL MEDICINE
Payer: MEDICARE

## 2019-04-24 VITALS
TEMPERATURE: 98 F | SYSTOLIC BLOOD PRESSURE: 92 MMHG | HEART RATE: 71 BPM | OXYGEN SATURATION: 96 % | RESPIRATION RATE: 16 BRPM | DIASTOLIC BLOOD PRESSURE: 65 MMHG

## 2019-04-24 DIAGNOSIS — R09.89 OTHER SPECIFIED SYMPTOMS AND SIGNS INVOLVING THE CIRCULATORY AND RESPIRATORY SYSTEMS: ICD-10-CM

## 2019-04-24 DIAGNOSIS — Z98.890 OTHER SPECIFIED POSTPROCEDURAL STATES: Chronic | ICD-10-CM

## 2019-04-24 DIAGNOSIS — Z90.3 ACQUIRED ABSENCE OF STOMACH [PART OF]: Chronic | ICD-10-CM

## 2019-04-24 PROBLEM — Z00.00 ENCOUNTER FOR PREVENTIVE HEALTH EXAMINATION: Status: ACTIVE | Noted: 2019-04-24

## 2019-04-24 PROBLEM — I25.10 ATHEROSCLEROTIC HEART DISEASE OF NATIVE CORONARY ARTERY WITHOUT ANGINA PECTORIS: Chronic | Status: ACTIVE | Noted: 2019-02-06

## 2019-04-24 PROBLEM — R56.9 UNSPECIFIED CONVULSIONS: Chronic | Status: ACTIVE | Noted: 2019-02-06

## 2019-04-24 PROBLEM — I10 ESSENTIAL (PRIMARY) HYPERTENSION: Chronic | Status: ACTIVE | Noted: 2019-02-06

## 2019-04-24 PROBLEM — D64.9 ANEMIA, UNSPECIFIED: Chronic | Status: ACTIVE | Noted: 2019-02-06

## 2019-04-24 PROBLEM — F03.90 UNSPECIFIED DEMENTIA WITHOUT BEHAVIORAL DISTURBANCE: Chronic | Status: ACTIVE | Noted: 2019-02-06

## 2019-04-24 PROBLEM — E78.5 HYPERLIPIDEMIA, UNSPECIFIED: Chronic | Status: ACTIVE | Noted: 2019-02-06

## 2019-04-24 LAB
ALBUMIN SERPL ELPH-MCNC: 3 G/DL — LOW (ref 3.3–5)
ALBUMIN SERPL ELPH-MCNC: 3.1 G/DL — LOW (ref 3.3–5)
ALP SERPL-CCNC: 97 U/L — SIGNIFICANT CHANGE UP (ref 40–120)
ALP SERPL-CCNC: SIGNIFICANT CHANGE UP U/L (ref 40–120)
ALT FLD-CCNC: <5 U/L — LOW (ref 10–45)
ALT FLD-CCNC: SIGNIFICANT CHANGE UP U/L (ref 10–45)
ANION GAP SERPL CALC-SCNC: 16 MMOL/L — SIGNIFICANT CHANGE UP (ref 5–17)
ANION GAP SERPL CALC-SCNC: 20 MMOL/L — HIGH (ref 5–17)
ANION GAP SERPL CALC-SCNC: 21 MMOL/L — HIGH (ref 5–17)
APPEARANCE UR: ABNORMAL
APTT BLD: 32.9 SEC — SIGNIFICANT CHANGE UP (ref 27.5–36.3)
AST SERPL-CCNC: 13 U/L — SIGNIFICANT CHANGE UP (ref 10–40)
AST SERPL-CCNC: SIGNIFICANT CHANGE UP U/L (ref 10–40)
BASOPHILS # BLD AUTO: 0.03 K/UL — SIGNIFICANT CHANGE UP (ref 0–0.2)
BASOPHILS NFR BLD AUTO: 0.3 % — SIGNIFICANT CHANGE UP (ref 0–2)
BILIRUB SERPL-MCNC: 0.4 MG/DL — SIGNIFICANT CHANGE UP (ref 0.2–1.2)
BILIRUB SERPL-MCNC: 0.4 MG/DL — SIGNIFICANT CHANGE UP (ref 0.2–1.2)
BILIRUB UR-MCNC: ABNORMAL
BUN SERPL-MCNC: 71 MG/DL — HIGH (ref 7–23)
BUN SERPL-MCNC: 84 MG/DL — HIGH (ref 7–23)
BUN SERPL-MCNC: 86 MG/DL — HIGH (ref 7–23)
CALCIUM SERPL-MCNC: 8.4 MG/DL — SIGNIFICANT CHANGE UP (ref 8.4–10.5)
CALCIUM SERPL-MCNC: 8.5 MG/DL — SIGNIFICANT CHANGE UP (ref 8.4–10.5)
CALCIUM SERPL-MCNC: 9.4 MG/DL — SIGNIFICANT CHANGE UP (ref 8.4–10.5)
CHLORIDE SERPL-SCNC: 101 MMOL/L — SIGNIFICANT CHANGE UP (ref 96–108)
CHLORIDE SERPL-SCNC: 105 MMOL/L — SIGNIFICANT CHANGE UP (ref 96–108)
CHLORIDE SERPL-SCNC: 97 MMOL/L — SIGNIFICANT CHANGE UP (ref 96–108)
CK MB CFR SERPL CALC: 2 NG/ML — SIGNIFICANT CHANGE UP (ref 0–6.7)
CO2 SERPL-SCNC: 12 MMOL/L — LOW (ref 22–31)
CO2 SERPL-SCNC: 16 MMOL/L — LOW (ref 22–31)
CO2 SERPL-SCNC: 16 MMOL/L — LOW (ref 22–31)
COLOR SPEC: YELLOW — SIGNIFICANT CHANGE UP
CREAT ?TM UR-MCNC: 256 MG/DL — SIGNIFICANT CHANGE UP
CREAT SERPL-MCNC: 5.35 MG/DL — HIGH (ref 0.5–1.3)
CREAT SERPL-MCNC: 6.05 MG/DL — HIGH (ref 0.5–1.3)
CREAT SERPL-MCNC: 6.44 MG/DL — HIGH (ref 0.5–1.3)
DIFF PNL FLD: ABNORMAL
EOSINOPHIL # BLD AUTO: 0.09 K/UL — SIGNIFICANT CHANGE UP (ref 0–0.5)
EOSINOPHIL NFR BLD AUTO: 0.9 % — SIGNIFICANT CHANGE UP (ref 0–6)
GAS PNL BLDV: SIGNIFICANT CHANGE UP
GAS PNL BLDV: SIGNIFICANT CHANGE UP
GLUCOSE SERPL-MCNC: 134 MG/DL — HIGH (ref 70–99)
GLUCOSE SERPL-MCNC: 220 MG/DL — HIGH (ref 70–99)
GLUCOSE SERPL-MCNC: 94 MG/DL — SIGNIFICANT CHANGE UP (ref 70–99)
GLUCOSE UR QL: NEGATIVE — SIGNIFICANT CHANGE UP
HCT VFR BLD CALC: 28 % — LOW (ref 34.5–45)
HGB BLD-MCNC: 8.6 G/DL — LOW (ref 11.5–15.5)
IMM GRANULOCYTES NFR BLD AUTO: 1.2 % — SIGNIFICANT CHANGE UP (ref 0–1.5)
INR BLD: 1.14 — SIGNIFICANT CHANGE UP (ref 0.88–1.16)
KETONES UR-MCNC: ABNORMAL MG/DL
LEUKOCYTE ESTERASE UR-ACNC: ABNORMAL
LYMPHOCYTES # BLD AUTO: 1.47 K/UL — SIGNIFICANT CHANGE UP (ref 1–3.3)
LYMPHOCYTES # BLD AUTO: 14.1 % — SIGNIFICANT CHANGE UP (ref 13–44)
MAGNESIUM SERPL-MCNC: 1.8 MG/DL — SIGNIFICANT CHANGE UP (ref 1.6–2.6)
MCHC RBC-ENTMCNC: 28.6 PG — SIGNIFICANT CHANGE UP (ref 27–34)
MCHC RBC-ENTMCNC: 30.7 GM/DL — LOW (ref 32–36)
MCV RBC AUTO: 93 FL — SIGNIFICANT CHANGE UP (ref 80–100)
MONOCYTES # BLD AUTO: 0.75 K/UL — SIGNIFICANT CHANGE UP (ref 0–0.9)
MONOCYTES NFR BLD AUTO: 7.2 % — SIGNIFICANT CHANGE UP (ref 2–14)
NEUTROPHILS # BLD AUTO: 7.97 K/UL — HIGH (ref 1.8–7.4)
NEUTROPHILS NFR BLD AUTO: 76.3 % — SIGNIFICANT CHANGE UP (ref 43–77)
NITRITE UR-MCNC: NEGATIVE — SIGNIFICANT CHANGE UP
NRBC # BLD: 0 /100 WBCS — SIGNIFICANT CHANGE UP (ref 0–0)
PH UR: 6 — SIGNIFICANT CHANGE UP (ref 5–8)
PHOSPHATE SERPL-MCNC: 4.5 MG/DL — SIGNIFICANT CHANGE UP (ref 2.5–4.5)
PLATELET # BLD AUTO: 180 K/UL — SIGNIFICANT CHANGE UP (ref 150–400)
POTASSIUM SERPL-MCNC: 5.3 MMOL/L — SIGNIFICANT CHANGE UP (ref 3.5–5.3)
POTASSIUM SERPL-MCNC: 5.8 MMOL/L — HIGH (ref 3.5–5.3)
POTASSIUM SERPL-MCNC: SIGNIFICANT CHANGE UP MMOL/L (ref 3.5–5.3)
POTASSIUM SERPL-SCNC: 5.3 MMOL/L — SIGNIFICANT CHANGE UP (ref 3.5–5.3)
POTASSIUM SERPL-SCNC: 5.8 MMOL/L — HIGH (ref 3.5–5.3)
POTASSIUM SERPL-SCNC: SIGNIFICANT CHANGE UP MMOL/L (ref 3.5–5.3)
PROT SERPL-MCNC: 6.1 G/DL — SIGNIFICANT CHANGE UP (ref 6–8.3)
PROT SERPL-MCNC: 6.4 G/DL — SIGNIFICANT CHANGE UP (ref 6–8.3)
PROT UR-MCNC: 100 MG/DL
PROTHROM AB SERPL-ACNC: 12.9 SEC — SIGNIFICANT CHANGE UP (ref 10–12.9)
RBC # BLD: 3.01 M/UL — LOW (ref 3.8–5.2)
RBC # FLD: 18.4 % — HIGH (ref 10.3–14.5)
SODIUM SERPL-SCNC: 133 MMOL/L — LOW (ref 135–145)
SODIUM SERPL-SCNC: 134 MMOL/L — LOW (ref 135–145)
SODIUM SERPL-SCNC: 137 MMOL/L — SIGNIFICANT CHANGE UP (ref 135–145)
SODIUM UR-SCNC: 35 MMOL/L — SIGNIFICANT CHANGE UP
SP GR SPEC: 1.02 — SIGNIFICANT CHANGE UP (ref 1–1.03)
TROPONIN T SERPL-MCNC: 0.09 NG/ML — CRITICAL HIGH (ref 0–0.01)
UROBILINOGEN FLD QL: 0.2 E.U./DL — SIGNIFICANT CHANGE UP
WBC # BLD: 10.43 K/UL — SIGNIFICANT CHANGE UP (ref 3.8–10.5)
WBC # FLD AUTO: 10.43 K/UL — SIGNIFICANT CHANGE UP (ref 3.8–10.5)

## 2019-04-24 PROCEDURE — 99285 EMERGENCY DEPT VISIT HI MDM: CPT | Mod: 25

## 2019-04-24 PROCEDURE — 93010 ELECTROCARDIOGRAM REPORT: CPT

## 2019-04-24 PROCEDURE — 71045 X-RAY EXAM CHEST 1 VIEW: CPT | Mod: 26

## 2019-04-24 RX ORDER — CEFTRIAXONE 500 MG/1
1 INJECTION, POWDER, FOR SOLUTION INTRAMUSCULAR; INTRAVENOUS ONCE
Qty: 0 | Refills: 0 | Status: COMPLETED | OUTPATIENT
Start: 2019-04-24 | End: 2019-04-24

## 2019-04-24 RX ORDER — SODIUM CHLORIDE 9 MG/ML
1000 INJECTION INTRAMUSCULAR; INTRAVENOUS; SUBCUTANEOUS ONCE
Qty: 0 | Refills: 0 | Status: COMPLETED | OUTPATIENT
Start: 2019-04-24 | End: 2019-04-24

## 2019-04-24 RX ORDER — DEXTROSE 50 % IN WATER 50 %
50 SYRINGE (ML) INTRAVENOUS ONCE
Qty: 0 | Refills: 0 | Status: COMPLETED | OUTPATIENT
Start: 2019-04-24 | End: 2019-04-24

## 2019-04-24 RX ORDER — ALBUTEROL 90 UG/1
10 AEROSOL, METERED ORAL ONCE
Qty: 0 | Refills: 0 | Status: COMPLETED | OUTPATIENT
Start: 2019-04-24 | End: 2019-04-24

## 2019-04-24 RX ORDER — DONEPEZIL HYDROCHLORIDE 10 MG/1
10 TABLET, FILM COATED ORAL AT BEDTIME
Qty: 0 | Refills: 0 | Status: DISCONTINUED | OUTPATIENT
Start: 2019-04-24 | End: 2019-05-01

## 2019-04-24 RX ORDER — PIPERACILLIN AND TAZOBACTAM 4; .5 G/20ML; G/20ML
2.25 INJECTION, POWDER, LYOPHILIZED, FOR SOLUTION INTRAVENOUS EVERY 12 HOURS
Qty: 0 | Refills: 0 | Status: DISCONTINUED | OUTPATIENT
Start: 2019-04-24 | End: 2019-04-25

## 2019-04-24 RX ORDER — PIPERACILLIN AND TAZOBACTAM 4; .5 G/20ML; G/20ML
2.25 INJECTION, POWDER, LYOPHILIZED, FOR SOLUTION INTRAVENOUS EVERY 8 HOURS
Qty: 0 | Refills: 0 | Status: DISCONTINUED | OUTPATIENT
Start: 2019-04-24 | End: 2019-04-24

## 2019-04-24 RX ORDER — LEVETIRACETAM 250 MG/1
500 TABLET, FILM COATED ORAL
Qty: 0 | Refills: 0 | Status: DISCONTINUED | OUTPATIENT
Start: 2019-04-24 | End: 2019-04-28

## 2019-04-24 RX ORDER — ESCITALOPRAM OXALATE 10 MG/1
10 TABLET, FILM COATED ORAL DAILY
Qty: 0 | Refills: 0 | Status: DISCONTINUED | OUTPATIENT
Start: 2019-04-24 | End: 2019-04-24

## 2019-04-24 RX ORDER — VANCOMYCIN HCL 1 G
750 VIAL (EA) INTRAVENOUS ONCE
Qty: 0 | Refills: 0 | Status: COMPLETED | OUTPATIENT
Start: 2019-04-24 | End: 2019-04-24

## 2019-04-24 RX ORDER — SODIUM CHLORIDE 9 MG/ML
500 INJECTION INTRAMUSCULAR; INTRAVENOUS; SUBCUTANEOUS ONCE
Qty: 0 | Refills: 0 | Status: DISCONTINUED | OUTPATIENT
Start: 2019-04-24 | End: 2019-04-24

## 2019-04-24 RX ORDER — PANTOPRAZOLE SODIUM 20 MG/1
40 TABLET, DELAYED RELEASE ORAL
Qty: 0 | Refills: 0 | Status: DISCONTINUED | OUTPATIENT
Start: 2019-04-24 | End: 2019-04-28

## 2019-04-24 RX ORDER — FERROUS SULFATE 325(65) MG
325 TABLET ORAL DAILY
Qty: 0 | Refills: 0 | Status: DISCONTINUED | OUTPATIENT
Start: 2019-04-24 | End: 2019-05-01

## 2019-04-24 RX ORDER — MEMANTINE HYDROCHLORIDE 10 MG/1
5 TABLET ORAL DAILY
Qty: 0 | Refills: 0 | Status: DISCONTINUED | OUTPATIENT
Start: 2019-04-24 | End: 2019-05-01

## 2019-04-24 RX ORDER — SODIUM CHLORIDE 9 MG/ML
500 INJECTION INTRAMUSCULAR; INTRAVENOUS; SUBCUTANEOUS ONCE
Qty: 0 | Refills: 0 | Status: COMPLETED | OUTPATIENT
Start: 2019-04-24 | End: 2019-04-24

## 2019-04-24 RX ORDER — NOREPINEPHRINE BITARTRATE/D5W 8 MG/250ML
0.05 PLASTIC BAG, INJECTION (ML) INTRAVENOUS
Qty: 8 | Refills: 0 | Status: DISCONTINUED | OUTPATIENT
Start: 2019-04-24 | End: 2019-04-27

## 2019-04-24 RX ORDER — ASCORBIC ACID 60 MG
500 TABLET,CHEWABLE ORAL DAILY
Qty: 0 | Refills: 0 | Status: DISCONTINUED | OUTPATIENT
Start: 2019-04-24 | End: 2019-04-24

## 2019-04-24 RX ORDER — DONEPEZIL HYDROCHLORIDE 10 MG/1
1 TABLET, FILM COATED ORAL
Qty: 0 | Refills: 0 | COMMUNITY

## 2019-04-24 RX ORDER — SODIUM POLYSTYRENE SULFONATE 4.1 MEQ/G
30 POWDER, FOR SUSPENSION ORAL ONCE
Qty: 0 | Refills: 0 | Status: COMPLETED | OUTPATIENT
Start: 2019-04-24 | End: 2019-04-24

## 2019-04-24 RX ORDER — HEPARIN SODIUM 5000 [USP'U]/ML
5000 INJECTION INTRAVENOUS; SUBCUTANEOUS EVERY 8 HOURS
Qty: 0 | Refills: 0 | Status: DISCONTINUED | OUTPATIENT
Start: 2019-04-24 | End: 2019-04-24

## 2019-04-24 RX ORDER — MIRTAZAPINE 45 MG/1
15 TABLET, ORALLY DISINTEGRATING ORAL AT BEDTIME
Qty: 0 | Refills: 0 | Status: DISCONTINUED | OUTPATIENT
Start: 2019-04-24 | End: 2019-05-01

## 2019-04-24 RX ORDER — INSULIN HUMAN 100 [IU]/ML
10 INJECTION, SOLUTION SUBCUTANEOUS ONCE
Qty: 0 | Refills: 0 | Status: COMPLETED | OUTPATIENT
Start: 2019-04-24 | End: 2019-04-24

## 2019-04-24 RX ORDER — ALLOPURINOL 300 MG
100 TABLET ORAL DAILY
Qty: 0 | Refills: 0 | Status: DISCONTINUED | OUTPATIENT
Start: 2019-04-24 | End: 2019-04-24

## 2019-04-24 RX ORDER — ATORVASTATIN CALCIUM 80 MG/1
10 TABLET, FILM COATED ORAL AT BEDTIME
Qty: 0 | Refills: 0 | Status: DISCONTINUED | OUTPATIENT
Start: 2019-04-24 | End: 2019-05-01

## 2019-04-24 RX ORDER — MEMANTINE HYDROCHLORIDE 10 MG/1
10 TABLET ORAL DAILY
Qty: 0 | Refills: 0 | Status: DISCONTINUED | OUTPATIENT
Start: 2019-04-24 | End: 2019-04-24

## 2019-04-24 RX ORDER — ALBUMIN HUMAN 25 %
500 VIAL (ML) INTRAVENOUS ONCE
Qty: 0 | Refills: 0 | Status: COMPLETED | OUTPATIENT
Start: 2019-04-24 | End: 2019-04-24

## 2019-04-24 RX ORDER — HEPARIN SODIUM 5000 [USP'U]/ML
INJECTION INTRAVENOUS; SUBCUTANEOUS
Qty: 25000 | Refills: 0 | Status: DISCONTINUED | OUTPATIENT
Start: 2019-04-24 | End: 2019-04-25

## 2019-04-24 RX ORDER — SODIUM CHLORIDE 9 MG/ML
1000 INJECTION INTRAMUSCULAR; INTRAVENOUS; SUBCUTANEOUS
Qty: 0 | Refills: 0 | Status: DISCONTINUED | OUTPATIENT
Start: 2019-04-24 | End: 2019-04-24

## 2019-04-24 RX ORDER — ALBUMIN HUMAN 25 %
250 VIAL (ML) INTRAVENOUS ONCE
Qty: 0 | Refills: 0 | Status: DISCONTINUED | OUTPATIENT
Start: 2019-04-24 | End: 2019-04-24

## 2019-04-24 RX ORDER — CHOLECALCIFEROL (VITAMIN D3) 125 MCG
1 CAPSULE ORAL
Qty: 0 | Refills: 0 | COMMUNITY

## 2019-04-24 RX ORDER — LACOSAMIDE 50 MG/1
200 TABLET ORAL
Qty: 0 | Refills: 0 | Status: DISCONTINUED | OUTPATIENT
Start: 2019-04-24 | End: 2019-04-24

## 2019-04-24 RX ADMIN — Medication 5.62 MICROGRAM(S)/KG/MIN: at 19:17

## 2019-04-24 RX ADMIN — PIPERACILLIN AND TAZOBACTAM 200 GRAM(S): 4; .5 INJECTION, POWDER, LYOPHILIZED, FOR SOLUTION INTRAVENOUS at 21:25

## 2019-04-24 RX ADMIN — HEPARIN SODIUM 1100 UNIT(S)/HR: 5000 INJECTION INTRAVENOUS; SUBCUTANEOUS at 22:50

## 2019-04-24 RX ADMIN — SODIUM CHLORIDE 500 MILLILITER(S): 9 INJECTION INTRAMUSCULAR; INTRAVENOUS; SUBCUTANEOUS at 17:11

## 2019-04-24 RX ADMIN — Medication 250 MILLIGRAM(S): at 22:49

## 2019-04-24 RX ADMIN — SODIUM CHLORIDE 1714.29 MILLILITER(S): 9 INJECTION INTRAMUSCULAR; INTRAVENOUS; SUBCUTANEOUS at 18:20

## 2019-04-24 RX ADMIN — CEFTRIAXONE 1 GRAM(S): 500 INJECTION, POWDER, FOR SOLUTION INTRAMUSCULAR; INTRAVENOUS at 18:00

## 2019-04-24 RX ADMIN — SODIUM CHLORIDE 500 MILLILITER(S): 9 INJECTION INTRAMUSCULAR; INTRAVENOUS; SUBCUTANEOUS at 17:07

## 2019-04-24 RX ADMIN — ALBUTEROL 2.5 MILLIGRAM(S): 90 AEROSOL, METERED ORAL at 16:32

## 2019-04-24 RX ADMIN — INSULIN HUMAN 10 UNIT(S): 100 INJECTION, SOLUTION SUBCUTANEOUS at 16:32

## 2019-04-24 RX ADMIN — SODIUM CHLORIDE 4000 MILLILITER(S): 9 INJECTION INTRAMUSCULAR; INTRAVENOUS; SUBCUTANEOUS at 17:27

## 2019-04-24 RX ADMIN — CEFTRIAXONE 100 GRAM(S): 500 INJECTION, POWDER, FOR SOLUTION INTRAMUSCULAR; INTRAVENOUS at 17:11

## 2019-04-24 RX ADMIN — SODIUM CHLORIDE 500 MILLILITER(S): 9 INJECTION INTRAMUSCULAR; INTRAVENOUS; SUBCUTANEOUS at 16:31

## 2019-04-24 RX ADMIN — Medication 50 MILLILITER(S): at 16:32

## 2019-04-24 RX ADMIN — HEPARIN SODIUM 5000 UNIT(S): 5000 INJECTION INTRAVENOUS; SUBCUTANEOUS at 21:25

## 2019-04-24 NOTE — ED ADULT NURSE NOTE - OBJECTIVE STATEMENT
74y F, able to follow commands and nod yes or no, hx of dementia, nonverbal. Presents to ed with HHA, for elevated creatinine and BUN. per HHA "its 6" PT denies pain, cp, sob, n/v, lightheadedness, dizziness. No fever nor chills. EKG performed, Dr Payne at bedside for evaluation. IV placed. 74y F, able to follow commands and nod yes or no, speaking in simple sentences. hx of dementia, nonverbal. Presents to ed with HHA, for elevated creatinine and BUN. per HHA "its 6" PT denies pain, cp, sob, n/v, lightheadedness, dizziness. No fever nor chills. EKG performed, Dr Payne at bedside for evaluation. IV placed.

## 2019-04-24 NOTE — H&P ADULT - NSHPLABSRESULTS_GEN_ALL_CORE
LABS:                        8.6    10.43 )-----------( 180      ( 2019 15:35 )             28.0         133<L>  |  101  |  84<H>  ----------------------------<  220<H>  5.8<H>   |  16<L>  |  6.05<H>    Ca    8.4      2019 17:21  Phos  5.0         TPro  6.4  /  Alb  3.1<L>  /  TBili  0.4  /  DBili  x   /  AST  SEE NOTE  /  ALT  SEE NOTE  /  AlkPhos  SEE NOTE      PT/INR - ( 2019 15:35 )   PT: 12.9 sec;   INR: 1.14          PTT - ( 2019 15:35 )  PTT:32.9 sec  Urinalysis Basic - ( 2019 16:15 )    Color: Yellow / Appearance: Cloudy / S.025 / pH: x  Gluc: x / Ketone: Trace mg/dL  / Bili: Moderate / Urobili: 0.2 E.U./dL   Blood: x / Protein: 100 mg/dL / Nitrite: NEGATIVE   Leuk Esterase: Large / RBC: < 5 /HPF / WBC Many /HPF   Sq Epi: x / Non Sq Epi: 0-5 /HPF / Bacteria: Present /HPF    Lactate, Blood (19 @ 00:02)    Lactate, Blood: 0.9 mmoL/L    Blood Gas Profile - Venous (19 @ 16:02)    pH, Venous: 7.28    pCO2, Venous: 41 mmHg    pO2, Venous: 48 mmHg    HCO3, Venous: 19 mmol/L    Base Excess, Venous: -7.1 mmol/L    Oxygen Saturation, Venous: 76 %      RADIOLOGY & ADDITIONAL TESTS:

## 2019-04-24 NOTE — ED PROVIDER NOTE - PROGRESS NOTE DETAILS
Daughter + HCP, Lorrie, at bedside - d/w her working dx + results. She also confirms no heroic measures such as intubation, CPR, central lines, HD; to treat + make comfortable. SW aware for MOLST.

## 2019-04-24 NOTE — H&P ADULT - NSICDXPASTMEDICALHX_GEN_ALL_CORE_FT
PAST MEDICAL HISTORY:  Anemia     CAD (coronary artery disease)     Dementia     HLD (hyperlipidemia)     HTN (hypertension)     Seizure

## 2019-04-24 NOTE — ED PROVIDER NOTE - OBJECTIVE STATEMENT
74y female h/o CAD, HTN, HLD, dementia, GERD, PVD, Afib, CKD, seizures on keppra, brought to ED aid after receiving call that labs from yesterday showed elevated creatinine (Cr on 2/11/19 was 1.2). Per aid, mental statis is at baseline; no appreciable fevers, no cough, V/D; has noted decreased urine output. Pt has advanced dementia, so history is limited, though she denies pain.

## 2019-04-24 NOTE — H&P ADULT - HISTORY OF PRESENT ILLNESS
72 yo F w PMHx CAD s/p stents (>10 years ago), HTN, HLD, anemia, seizure disorder (dx 01/2019) on Keppra, dementia with recent admission in 02/2019 for UTI who presents from home after labs performed by home physician showed ALE (86/6.44, baseline Cr 1.23). Per patient's daughter at bedside, pt has had kidney problems since childhood (unspecified) and frequently gets kidney stones and UTIs. Recently has noticed decreased urination. Denies f/c/n/v/d/c, CP, ab pain, dyspnea, dysuria, hematuria, bloody stools, melena, sick contacts. Also of note, daughter reports mental status decreased since January after being diagnosed w/ seizure, however no acute alterations.     In ED, T: labs showed acute ALE with hyperkalemia, metabolic acidosis, elevated phos.     FeNa prerenal.    Fierro placed which drained only a few ccs - cloudy with subsequent UA positive for UTI. No leukocytosis, elevated lactate or fever. Patient was given ceftriaxone for UTI and insulin/d50/kayexalate for hyperkalemia when patient's blood pressure trended down. Patient received 2 L of NS       D50, insulin 10, kayexalate; VBG K 6.5; ?peaked T's 74 yo F w PMH CAD s/p stents (>10 years ago), HTN, HLD, anemia, seizure disorder (dx 01/2019; on Keppra), dementia, recent admission in 02/2019 for UTI who presents from home after labs performed by home physician showed ALE (86/6.44, baseline Cr 1.23). Per patient's daughter at bedside, pt has had kidney problems since childhood (unspecified) and frequently gets kidney stones and UTIs. Recently has noticed decreased urination 2-3days (uncertain duration as pt uses diapers). Also complaints of LLE pain x3days. Today, with complaints of abdomen and back pain. Denies f/c/n/v/d/c, CP, ab pain, dyspnea, dysuria, hematuria, bloody stools, melena, sick contacts. Also of note, daughter reports mental status decreased since January after being diagnosed w/ seizure, however no acute alterations. Per daughter mother has been in nursing home until one week ago. She is currently at home with 24hr aide. For the last 2-3 months, has had decreased appetite.     In ED, T: 97.9, P: 71, BP: 92/65, RR: 16, O2: 96% RA. Labs BUN/Cr: 86/6.44, K: 6.7, bicarb: 16, P: 5. Fierro placed which drained only a few mL. U/a suggesting UTI. Patient was given ceftriaxone for UTI and insulin/d50/kayexalate for hyperkalemia. Patient's BP downtrended in the ED to sBP in 70's. Patient received 3L of NS with minimal improvement and ultimately started on levo.     D50, insulin 10, kayexalate; VBG K 6.5; ?peaked T's 72 yo F w PMH CAD s/p stents (>10 years ago), HTN, HLD, anemia, seizure disorder (dx 01/2019; on Keppra), dementia, depression, recent admission in 02/2019 for UTI who presents from home after labs performed by home physician showed ALE (86/6.44, baseline Cr 1.23). Per patient's daughter, patient had bloodwork obtained for regular checkup on 4/23. PCP noted significantly elevated BUN/CR (compared to bloodwork from nursing home on 4/16) and sent patient to ED. Daughter/aide has noticed decreased urination for possibly 2-3days (uncertain duration as pt uses diapers). Also patient has been complaining of LLE pain x3days. Today, with complaints of abdomen and back pain. No reports of f/c/n/v/d/c, CP, ab pain, dyspnea, dysuria, hematuria, bloody stools, melena, sick contacts. Of note, daughter reports pt has had kidney problems since childhood (unspecified) and frequently gets kidney stones and UTIs. Pt's mental status decreased since January after being having a seizure, however no acute alterations. Also per daughter, mother has been in nursing home since January until one week ago. She is currently at home with 24hr aide. For the last 2-3 months, has had decreased PO intake and weight loss.     In ED, T: 97.9, P: 71, BP: 92/65, RR: 16, O2: 96% RA. Labs BUN/Cr: 86/6.44, K: 6.7, bicarb: 16, P: 5. ECG w/ peaking T waves. Fierro placed which drained only a few mL. U/a suggesting UTI. Patient was given ceftriaxone for UTI and insulin/d50/kayexalate for hyperkalemia. Patient's BP downtrended in the ED to sBP in 70's. Patient received 3L of NS with minimal improvement and ultimately started on levo.

## 2019-04-24 NOTE — ED ADULT TRIAGE NOTE - CHIEF COMPLAINT QUOTE
Patient's PMD Dr Rene Resendiz ([phone]) called ems for patient for abnormal labs (high creatinine and BUN)

## 2019-04-24 NOTE — ED ADULT NURSE NOTE - CHPI ED NUR SYMPTOMS NEG
no vomiting/no syncope/no nausea/no back pain/no fever/no chest pain/no congestion/no chills/no shortness of breath/no diaphoresis/no dizziness

## 2019-04-24 NOTE — ED ADULT NURSE REASSESSMENT NOTE - NS ED NURSE REASSESS COMMENT FT1
Contacted [facility] for emergency contact number Daughter ( Lorrie Schmitt) [phone]

## 2019-04-24 NOTE — CONSULT NOTE ADULT - ASSESSMENT
74 year old female with dementia, multiple UTIs in past presents with shock from urosepsis.      Recommendations:   -s/p levophed initiation for hypotension   -s/p UA showing UTI, urine lytes showing pre-renal etiology  -f/u retroperitoneal ultrasound   -s/p CTX in ED; would cover broadly while await results of urine culture  -f/u Ucx and narrow antibiotics as appropriate  -LLE ultrasound to r/o DVT  -repeat labwork to monitor K, kidney function   -admission to MICU for further management 74 year old female with dementia, multiple UTIs in past presents with shock from urosepsis.      Recommendations:   -s/p levophed initiation for hypotension   -continue fluids and encourage po intake  -f/u retroperitoneal ultrasound   -would cover broadly while await results of urine culture  -f/u Ucx and narrow antibiotics as appropriate  -LLE ultrasound to r/o DVT  -repeat labwork to monitor K, kidney function; continue to trend  -admission to MICU for further management

## 2019-04-24 NOTE — H&P ADULT - NSHPPHYSICALEXAM_GEN_ALL_CORE
VITAL SIGNS:  T(C): 36.6 (04-24-19 @ 14:55), Max: 36.6 (04-24-19 @ 14:55)  T(F): 97.9 (04-24-19 @ 14:55), Max: 97.9 (04-24-19 @ 14:55)  HR: 68 (04-24-19 @ 19:25) (58 - 82)  BP: 98/55 (04-24-19 @ 19:25) (78/51 - 99/59)  BP(mean): --  RR: 16 (04-24-19 @ 19:25) (16 - 18)  SpO2: 100% (04-24-19 @ 19:25) (94% - 100%)  Wt(kg): --    PHYSICAL EXAM:    Constitutional: ill-appearing; NAD  Head: NC/AT  Eyes: PERRL, EOMI, anicteric sclera  ENT: no nasal discharge; uvula midline, no oropharyngeal erythema or exudates; MMM  Neck: supple; no JVD or thyromegaly  Respiratory: CTA B/L; no W/R/R, no retractions  Cardiac: +S1/S2; RRR; no M/R/G  Gastrointestinal: L sided CVA tenderness; soft, nondistended; no rebound or guarding; +BSx4  Genitourinary: indwelling gomez  Back: spine midline, no bony tenderness or step-offs; no CVAT B/L  Extremities: WWP, no clubbing or cyanosis; no peripheral edema  Musculoskeletal: NROM x4; no joint swelling, tenderness or erythema  Vascular: 2+ radial, DP pulses B/L  Dermatologic: skin warm, dry and intact; no rashes, wounds, or scars  Lymphatic: no submandibular or cervical LAD  Neurologic: AAOx1; CNII-XII grossly intact; no focal deficits VITAL SIGNS:  T(C): 36.6 (04-24-19 @ 14:55), Max: 36.6 (04-24-19 @ 14:55)  T(F): 97.9 (04-24-19 @ 14:55), Max: 97.9 (04-24-19 @ 14:55)  HR: 68 (04-24-19 @ 19:25) (58 - 82)  BP: 98/55 (04-24-19 @ 19:25) (78/51 - 99/59)  BP(mean): --  RR: 16 (04-24-19 @ 19:25) (16 - 18)  SpO2: 100% (04-24-19 @ 19:25) (94% - 100%)  Wt(kg): --    PHYSICAL EXAM:    Constitutional: lethargic, occasionally following commands; WDWN; NAD  Head: NC/AT  Eyes: PERRL, EOMI, anicteric sclera  ENT: no nasal discharge; uvula midline, no oropharyngeal erythema or exudates; dry MM  Neck: supple; no JVD or thyromegaly  Respiratory: CTA B/L; no W/R/R, no retractions  Cardiac: +S1/S2; RRR; no M/R/G  Gastrointestinal: soft, nontender, nondistended; no rebound or guarding; +BSx4  Genitourinary: indwelling gomez  Back: no bony tenderness or step-offs; no CVAT B/L  Extremities: LLE swelling > RLE; WWP, no clubbing or cyanosis; nonpitting peripheral edema  Musculoskeletal: NROM x4; no joint swelling, tenderness or erythema  Vascular: 1+ radial, DP pulses B/L  Dermatologic: skin warm, dry and intact; no rashes, wounds, or scars  Lymphatic: no submandibular or cervical LAD  Neurologic: minimally verbal; no focal deficits

## 2019-04-24 NOTE — ED ADULT NURSE NOTE - NSIMPLEMENTINTERV_GEN_ALL_ED
Implemented All Fall with Harm Risk Interventions:  [place] to call system. Call bell, personal items and telephone within reach. Instruct patient to call for assistance. Room bathroom lighting operational. Non-slip footwear when patient is off stretcher. Physically safe environment: no spills, clutter or unnecessary equipment. Stretcher in lowest position, wheels locked, appropriate side rails in place. Provide visual cue, wrist band, yellow gown, etc. Monitor gait and stability. Monitor for mental status changes and reorient to person, place, and time. Review medications for side effects contributing to fall risk. Reinforce activity limits and safety measures with patient and family. Provide visual clues: red socks.

## 2019-04-24 NOTE — H&P ADULT - ASSESSMENT
74 yo F w PMHx CAD s/p stents (>10 years ago), HTN, HLD, anemia, seizure disorder (dx 01/2019) on Keppra, dementia with recent admission in 02/2019 for UTI who presents with ALE and electrolyte derangements in the setting of UTI and hypotension refractory to fluids and necessitating pressors.    NEURO  #Dementia: history of dementia, worsened since onset of seizures; as per daughter, mother not very aware of her surroundings at times and is occasionally nonverbal and lethargic (frequently sleeping)  -c/w donepezil 10mg qhs    #Seizure  -continue w/ 72 yo F w PMHx CAD s/p stents (>10 years ago), HTN, HLD, anemia, seizure disorder (dx 01/2019) on Keppra, dementia with recent admission in 02/2019 for UTI who presents with ALE and electrolyte derangements in the setting of UTI and hypotension refractory to fluids and necessitating pressors.    NEURO  #Dementia: history of dementia, worsened since onset of seizures; as per daughter, mother not very aware of her surroundings at times and is occasionally nonverbal and lethargic (frequently sleeping) due to dementia and depression; as per daughter, current mental status is consistent with mother's norm  -c/w donepezil 10mg qhs  -c/w memantine 5mg qd  -dysphagia screen    #Seizure: no seizures since first diagnosed in Jan  -continue w/ keppra 500mg bid  -holding home lacosamide for now due to renal dysfunction; consider epilepsy/pharmacist consult for proper dosing clarification    PULMONARY  Satting appropriately on room air    CARDIAC  #Shock: hypotensive refractory to 3L NS boluses; requiring pressors; as per last admission records, BP typically in the 120-130 range; patient significantly dry on exam and oliguric suggesting component of hypovolemia, however possible underlying infectious and iatrogenic component  -strict I/Os  -cautious fluid repletion as pt s/p 3L NS, unclear cardiac function; currently administering NS 50cc/hr  -c/w pressors, titrate to MAP >65  -holding home antihypertensives (lisinopril, lopressor), baclofen, xanax for now     #CAD: s/p stents  -c/w atorvastatin 10mg qhs  -holding lopressor 25mg bid and lisinopril 5mg qd in the setting of shock    #Troponinemia: ECG w/ T wave peaking, however no evidence of ischemia; possibly 2/2 demand in the setting of shock and 2/2 decreased renal clearance  -c/w trend trops and serial ECGs  -c/w cardiac telemetry    ID  #UTI: u/a suggestive of UTI; patient w/ recurrent UTI and hx nephrolithiasis; recently hospitalized in Feb for UTI and lethargy (s/p CTX tx) and Jan for seizure and UTI (s/p cefpodoxime tx); s/p CTX 2g in ED  -f/u bcx, ucx  -starting vanc/zosyn due to recent hospitalization and current shock requiring pressors  -dose vanc by level    RENAL  #Acute on chronic kidney failure: baseline 1.2; FeNa suggesting prerenal; as per daughter, mother with decreased PO intake over the last several months with weight loss; very dry MM on PE; s/p 3L NS in the ED; patient also with apparent genitourinary infection likely contributing to renal dysfunction  -c/w maintenance fluids NS 50cc/hr  -trend BUN/Cr  -avoid nephrotoxins, ACE/Arb, IV contrast  -renally dose medications; certain home meds unable to be given due to renal dysfunction (llopurinol, lacosamide, lexapro); will reassess when to restart  -f/u renal u/s; r/o nephrolithiasis  -strict I/Os  -if worsening, obtain renal consult    #Electrolyte derangements: patient w/ hyperkalemia, hyperphosphatemia, hyponatremia; likely primarily due to acute on chronic renal dysfunction  -trend lytes [id number]  -hyperkalemia cocktail as needed  -serial ECGs  -renal consult if worsening electrolytes, concerning ECG changes  -c/w telemetry monitoring    #Metabolic acidosis w/ elevated anion gap: likely due to uremia; lactate normal  -c/w maintenance fluids  -c/w telemetry monitoring    HEME  #DVT r/o: LLE pain for 2-3days; LLE swelling noted on PE; as per pulm fellow, bedside u/s w/ noncompressible at all 5 stations of LLE (common fem, saphenous intake, superficial and deep femoral, popliteal)  -f/u duplex u/s  -starting heparin gtt, goal ptt 60-80     #Normocytic anemia: hgb within patient's baseline; prior iron studies (Feb 2019) suggesting anemia of chronic disease  -continue to monitor     PSYCH  #Depression: diagnosed w/ depression; as per daughter, pt w/ decreased PO x 2-3mos and bouts of sleepiness, lethargy, and minimal interaction  -c/w remeron 15mg qhs    F: NS 50 cc/hr  E: replete lytes as needed; monitoring q2h-4h due to multiple electrolyte derangements  N: DASH/renal diet pending dysphagia screen  LINES; peripheral IV's  GOMEZ: indwelling gomez placed in ED (4/24-)  DISPO: MICU  CODE: DNR/DNI (MOLST in chart); consider palliative care consult 72 yo F w PMHx CAD s/p stents (>10 years ago), HTN, HLD, anemia, seizure disorder (dx 01/2019) on Keppra, dementia with recent admission in 02/2019 for UTI who presents with ALE and electrolyte derangements in the setting of UTI and hypotension refractory to fluids and necessitating pressors.    NEURO  #Dementia: history of dementia, worsened since onset of seizures; as per daughter, mother not very aware of her surroundings at times and is occasionally nonverbal and lethargic (frequently sleeping) due to dementia and depression; as per daughter, current mental status is consistent with mother's norm  -c/w donepezil 10mg qhs  -c/w memantine 5mg qd  -dysphagia screen    #Seizure: no seizures since first diagnosed in Jan  -continue w/ keppra 500mg bid  -holding home lacosamide for now due to renal dysfunction; consider epilepsy/pharmacist consult for proper dosing clarification    PULMONARY  Satting appropriately on room air    CARDIAC  #Shock: hypotensive refractory to 3L NS boluses; requiring pressors; as per last admission records, BP typically in the 120-130 range; patient significantly dry on exam and oliguric suggesting component of hypovolemia, however possible underlying infectious and iatrogenic component  -strict I/Os  -cautious fluid repletion as pt s/p 3L NS, unclear cardiac function; currently administering NS 50cc/hr  -c/w pressors, titrate to MAP >65  -holding home antihypertensives (lisinopril, lopressor), baclofen, xanax for now     #CAD: s/p stents  -c/w atorvastatin 10mg qhs  -holding lopressor 25mg bid and lisinopril 5mg qd in the setting of shock    #Troponinemia: ECG w/ T wave peaking, however no evidence of ischemia; possibly 2/2 demand in the setting of shock and 2/2 decreased renal clearance  -c/w trend trops and serial ECGs  -c/w cardiac telemetry    ID  #UTI: u/a suggestive of UTI; patient w/ recurrent UTI and hx nephrolithiasis; recently hospitalized in Feb for UTI and lethargy (s/p CTX tx) and Jan for seizure and UTI (s/p cefpodoxime tx); s/p CTX 2g in ED  -f/u bcx, ucx  -starting vanc/zosyn due to recent hospitalization and current shock requiring pressors  -dose vanc by level    RENAL  #Acute on chronic kidney failure: baseline 1.2; FeNa 0.7% suggesting prerenal; as per daughter, mother with decreased PO intake over the last several months with weight loss; very dry MM on PE; s/p 3L NS in the ED; patient also with apparent genitourinary infection likely contributing to renal dysfunction  -c/w maintenance fluids NS 50cc/hr  -trend BUN/Cr  -avoid nephrotoxins, ACE/Arb, IV contrast  -renally dose medications; certain home meds unable to be given due to renal dysfunction (llopurinol, lacosamide, lexapro); will reassess when to restart  -f/u renal u/s; r/o nephrolithiasis  -strict I/Os  -if worsening, obtain renal consult    #Electrolyte derangements: patient w/ hyperkalemia, hyperphosphatemia, hyponatremia; likely primarily due to acute on chronic renal dysfunction  -trend lytes [id number]  -hyperkalemia cocktail as needed  -serial ECGs  -renal consult if worsening electrolytes, concerning ECG changes  -c/w telemetry monitoring    #Metabolic acidosis w/ elevated anion gap: likely due to uremia; lactate normal  -c/w maintenance fluids  -c/w telemetry monitoring    HEME  #DVT r/o: LLE pain for 2-3days; LLE swelling noted on PE; as per pulm fellow, bedside u/s w/ noncompressible at all 5 stations of LLE (common fem, saphenous intake, superficial and deep femoral, popliteal)  -f/u duplex u/s  -starting heparin gtt, goal ptt 60-80     #Normocytic anemia: hgb within patient's baseline; prior iron studies (Feb 2019) suggesting anemia of chronic disease  -continue to monitor     PSYCH  #Depression: diagnosed w/ depression; as per daughter, pt w/ decreased PO x 2-3mos and bouts of sleepiness, lethargy, and minimal interaction  -c/w remeron 15mg qhs    F: NS 50 cc/hr  E: replete lytes as needed; monitoring q2h-4h due to multiple electrolyte derangements  N: DASH/renal diet pending dysphagia screen  LINES; peripheral IV's  GOMEZ: indwelling gomez placed in ED (4/24-)  DISPO: MICU  CODE: DNR/DNI (MOLST in chart); consider palliative care consult

## 2019-04-24 NOTE — ED PROVIDER NOTE - CARE PLAN
Principal Discharge DX:	Hyperkalemia  Secondary Diagnosis:	Renal failure Principal Discharge DX:	Hyperkalemia  Secondary Diagnosis:	Renal failure  Secondary Diagnosis:	Hypotension  Secondary Diagnosis:	UTI (urinary tract infection)

## 2019-04-24 NOTE — ED ADULT NURSE REASSESSMENT NOTE - NS ED NURSE REASSESS COMMENT FT1
Transport here for US. Discussed concern with pt going to US with MD newman, US to be delayed. Continues to receive IV fluids. Pt responsive to verbal and tactile stimuli. Poor urine out pt. Continues to be on cardiac monitor. Transport here for US. Discussed concern with pt going to US with MD newman, Discussed need for iv vasopressor, fluids only for this time. US to be delayed. Continues to receive IV fluids. Pt responsive to verbal and tactile stimuli. Poor urine out pt. Continues to be on cardiac monitor.

## 2019-04-24 NOTE — ED ADULT NURSE NOTE - CHIEF COMPLAINT QUOTE
Patient's PMD Dr Rene Resendiz ([phone]) called ems for patient for abnormal labs (high creatinine and BUN)

## 2019-04-24 NOTE — ED PROVIDER NOTE - CLINICAL SUMMARY MEDICAL DECISION MAKING FREE TEXT BOX
74y female extensive PMH in ED as recent labs showed elevated creatinine. Cr 1.2 on 2.11.19. Pt contributes minimally to history 2/2 dementia, however aid reports decreased urine output. HDS, non-toxic appearing. EKG w peaked T waves, o/w ok. Labs pending. Will place Fierro for possible urinary obstruction. 74y female extensive PMH in ED as recent labs showed elevated creatinine. Cr 1.2 on 2.11.19. Pt contributes minimally to history 2/2 dementia, however aid reports decreased urine output. BPs 80s-90s systolic, afebrile recetally, non-toxic appearing. EKG w peaked T waves, o/w ok. Labs show K 6.7, Cr 6.4. Hyperkalemia txt ordered + IVF. Fierro placed for possible urinary obstruction - minimal output, cloudy yellow urine. D/w ICU for consult. Paged out to renal fellow as well.

## 2019-04-24 NOTE — CONSULT NOTE ADULT - SUBJECTIVE AND OBJECTIVE BOX
73 yoF w PMHx CAD s/p stents (>10 years ago), HTN, HLD, anemia, seizure disorder (dx 01/2019) on Keppra, dementia (waxes and wanes) with recent admission in 02/2019 for UTI who presents from home after labwork performed by home physician showed ALE (86/6.44, baseline Cr 1.23). Per patient's daughter at bedside she has had kidney problems since childhood (unspecified) and frequently gets kidney stones and UTIs. Per daughter, mother's mental status decline acutely changed in January after seizure disorder diagnosed, but no significant acute changes in the past three days from baseline. Denies fevers, chills, had not complained of pain with urination, however, daughter noted that urination has been scant. In ED, labwork showed acute ALE with hyperkalemia, metabolic acidosis, elevated phos.     FeNa prerenal.    Fierro placed which drained only a few ccs - cloudy with subsequent UA positive for UTI. No leukocytosis, elevated lactate or fever. Patient was given ceftriaxone for UTI and insulin/d50/kayexalate for hyperkalemia when patient's blood pressure trended down. Patient received 2 L of NS 73 yoF w PMHx CAD s/p stents (>10 years ago), HTN, HLD, anemia, seizure disorder (dx 2019) on Keppra, dementia (waxes and wanes) with recent admission in 2019 for UTI who presents from home after labwork performed by home physician showed ALE (86/6.44, baseline Cr 1.23). Per patient's daughter at bedside she has had kidney problems since childhood (unspecified) and frequently gets kidney stones and UTIs. Per daughter, mother's mental status decline acutely changed in January after seizure disorder diagnosed, but no significant acute changes in the past three days from baseline (some intermittent agitation). Denies fevers, chills, had not complained of pain with urination, however, daughter noted that urination has been scant. Home aid noted more frequency in going to the bathroom. In ED, labwork showed acute ALE with hyperkalemia, metabolic acidosis, elevated phos. Gomez placed which drained only a few ccs - cloudy with subsequent UA positive for UTI. No leukocytosis, elevated lactate or fever. Patient was given ceftriaxone for UTI and insulin/d50/kayexalate for hyperkalemia when patient's blood pressure trended down. Patient received 2 L of NS. FeNa prerenal. Patient     PMHx: as above  Surgical: , ?thyroid nodule removed  FHx: no kidney dz  Allergies: NKA  Meds: levetiracetam, omeprazole, lisinopril, metoprolol, allopurinol, atorvastatin, iron, memantine, donepezil, baclofen, sodium bicarb, vimpat, alprazolam, lexapro, mirtazapine  Social; 1-2cigs/day for "many years"  ROS: L leg pain    VITAL SIGNS:  Vital Signs Last 24 Hrs  T(C): 36.6 (2019 14:55), Max: 36.6 (2019 14:55)  T(F): 97.9 (2019 14:55), Max: 97.9 (2019 14:55)  HR: 68 (2019 19:25) (58 - 82)  BP: 98/55 (2019 19:25) (78/51 - 99/59)  RR: 16 (2019 19:25) (16 - 18)  SpO2: 100% (2019 19:25) (94% - 100%)    PHYSICAL EXAM:  General: elderly female laying in bed in NAD  HEENT: PERRL, anicteric sclera, dry mucous membranes  Cardiovascular: +S1/S2; regular rate/rhythm  Respiratory: CTA B/L; no W/R/R  Gastrointestinal: soft, nontender to palpation in lower quadrants, +left sided CVA tenderness  : gomez in place with only a few cc of concentrated urine in bag  Extremities: warm, well perfused  Vascular: 2+ radial and DP pulses  Neurological: calm and cooperative; follows basic commands    MEDICATIONS  (STANDING):  norepinephrine Infusion 0.05 MICROgram(s)/kG/Min (5.625 mL/Hr) IV Continuous <Continuous>    ALLERGIES:  No Known Allergies    LABS:                        8.6    10.43 )-----------( 180      ( 2019 15:35 )             28.0         133<L>  |  101  |  84<H>  ----------------------------<  220<H>  5.8<H>   |  16<L>  |  6.05<H>    Ca    8.4      2019 17:21  Phos  5.0         TPro  6.4  /  Alb  3.1<L>  /  TBili  0.4  /  DBili  x   /  AST  SEE NOTE  /  ALT  SEE NOTE  /  AlkPhos  SEE NOTE      PT/INR - ( 2019 15:35 )   PT: 12.9 sec;   INR: 1.14     PTT - ( 2019 15:35 )  PTT:32.9 sec    Urinalysis Basic - ( 2019 16:15 )    Color: Yellow / Appearance: Cloudy / S.025 / pH: x  Gluc: x / Ketone: Trace mg/dL  / Bili: Moderate / Urobili: 0.2 E.U./dL   Blood: x / Protein: 100 mg/dL / Nitrite: NEGATIVE   Leuk Esterase: Large / RBC: < 5 /HPF / WBC Many /HPF   Sq Epi: x / Non Sq Epi: 0-5 /HPF / Bacteria: Present /HPF      CAPILLARY BLOOD GLUCOSE    POCT Blood Glucose.: 179 mg/dL (2019 17:34)    CARDIAC MARKERS ( 2019 17:21 )  x     / x     / 97 U/L / x     / x            RADIOLOGY & ADDITIONAL TESTS: Reviewed.      ASSESSMENT:    PLAN: 73 yoF w PMHx CAD s/p stents (>10 years ago), HTN, HLD, anemia, seizure disorder (dx 2019) on Keppra, dementia (waxes and wanes) with recent admission in 2019 for UTI who presents from home after labwork performed by home physician showed ALE (86/6.44, baseline Cr 1.23). Per patient's daughter at bedside she has had kidney problems since childhood (unspecified) and frequently gets kidney stones and UTIs. Per daughter, mother's mental status decline acutely changed in January after seizure disorder diagnosed, but no significant acute changes in the past three days from baseline (some intermittent agitation). Denies fevers, chills, had not complained of pain with urination, however, daughter noted that urination has been scant. Home aid noted multiple loose bowel movements, no melena. In ED, labwork showed acute ALE (FeNa showing pre-renal etiology) with hyperkalemia, metabolic acidosis, elevated phos. Gomez placed which drained only a few ccs - cloudy with subsequent UA positive for UTI. No leukocytosis, elevated lactate or fever. Patient was given ceftriaxone for UTI and insulin/d50/kayexalate for hyperkalemia. Levophed started when patient's blood pressure trended down, refractory to 3L NS.    PMHx: as above  Surgical: , ?thyroid nodule removed  FHx: no kidney dz  Allergies: NKA  Meds: levetiracetam, omeprazole, lisinopril, metoprolol, allopurinol, atorvastatin, iron, memantine, donepezil, baclofen, sodium bicarb, vimpat, alprazolam, lexapro, mirtazapine  Social; 1-2cigs/day for "many years"  ROS: L leg pain    VITAL SIGNS:  Vital Signs Last 24 Hrs  T(C): 36.6 (2019 14:55), Max: 36.6 (2019 14:55)  T(F): 97.9 (2019 14:55), Max: 97.9 (2019 14:55)  HR: 68 (2019 19:25) (58 - 82)  BP: 98/55 (2019 19:25) (78/51 - 99/59)  RR: 16 (2019 19:25) (16 - 18)  SpO2: 100% (2019 19:25) (94% - 100%)    PHYSICAL EXAM:  General: elderly female laying in bed in NAD  HEENT: PERRL, anicteric sclera, dry mucous membranes  Cardiovascular: +S1/S2; regular rate/rhythm  Respiratory: CTA B/L; no W/R/R  Gastrointestinal: soft, nontender to palpation in lower quadrants, +left sided CVA tenderness  : gomez in place with only a few cc of concentrated urine in bag  Extremities: warm, well perfused; bilateral edema with LLE>RLE  Vascular: 2+ radial and DP pulses  Neurological: calm and cooperative; follows basic commands    MEDICATIONS  (STANDING):  norepinephrine Infusion 0.05 MICROgram(s)/kG/Min (5.625 mL/Hr) IV Continuous <Continuous>    ALLERGIES:  No Known Allergies    LABS:                        8.6    10.43 )-----------( 180      ( 2019 15:35 )             28.0         133<L>  |  101  |  84<H>  ----------------------------<  220<H>  5.8<H>   |  16<L>  |  6.05<H>    Ca    8.4      2019 17:21  Phos  5.0         TPro  6.4  /  Alb  3.1<L>  /  TBili  0.4  /  DBili  x   /  AST  SEE NOTE  /  ALT  SEE NOTE  /  AlkPhos  SEE NOTE      PT/INR - ( 2019 15:35 )   PT: 12.9 sec;   INR: 1.14     PTT - ( 2019 15:35 )  PTT:32.9 sec    Urinalysis Basic - ( 2019 16:15 )    Color: Yellow / Appearance: Cloudy / S.025 / pH: x  Gluc: x / Ketone: Trace mg/dL  / Bili: Moderate / Urobili: 0.2 E.U./dL   Blood: x / Protein: 100 mg/dL / Nitrite: NEGATIVE   Leuk Esterase: Large / RBC: < 5 /HPF / WBC Many /HPF   Sq Epi: x / Non Sq Epi: 0-5 /HPF / Bacteria: Present /HPF      CAPILLARY BLOOD GLUCOSE    POCT Blood Glucose.: 179 mg/dL (2019 17:34)    CARDIAC MARKERS ( 2019 17:21 )  x     / x     / 97 U/L / x     / x        RADIOLOGY & ADDITIONAL TESTS: Reviewed.

## 2019-04-24 NOTE — CHART NOTE - NSCHARTNOTEFT_GEN_A_CORE
A bedside ultrasound was done to evaluated for DVT. The left leg was grossly disproportionately swollen compared to the right. A bedside LE doppler showed lack of compressibility at all 5 stations of LLE (common fem, saphenous intake, superficial and deep femoral, as well a popliteal) There was an echogenicity noted in the common femoral. The patient had no prior hx of any bleeding, and was not presently on any AC, so the decision was made to empirically start heparin gtt. Formal dopplers pending.

## 2019-04-25 LAB
ALBUMIN SERPL ELPH-MCNC: 3.3 G/DL — SIGNIFICANT CHANGE UP (ref 3.3–5)
ALP SERPL-CCNC: 93 U/L — SIGNIFICANT CHANGE UP (ref 40–120)
ALT FLD-CCNC: <5 U/L — LOW (ref 10–45)
ANION GAP SERPL CALC-SCNC: 18 MMOL/L — HIGH (ref 5–17)
ANION GAP SERPL CALC-SCNC: 18 MMOL/L — HIGH (ref 5–17)
APTT BLD: 157.3 SEC — CRITICAL HIGH (ref 27.5–36.3)
APTT BLD: 52.9 SEC — HIGH (ref 27.5–36.3)
APTT BLD: >200 SEC — CRITICAL HIGH (ref 27.5–36.3)
AST SERPL-CCNC: 12 U/L — SIGNIFICANT CHANGE UP (ref 10–40)
BASOPHILS # BLD AUTO: 0.03 K/UL — SIGNIFICANT CHANGE UP (ref 0–0.2)
BASOPHILS NFR BLD AUTO: 0.2 % — SIGNIFICANT CHANGE UP (ref 0–2)
BILIRUB SERPL-MCNC: 0.3 MG/DL — SIGNIFICANT CHANGE UP (ref 0.2–1.2)
BLD GP AB SCN SERPL QL: NEGATIVE — SIGNIFICANT CHANGE UP
BUN SERPL-MCNC: 68 MG/DL — HIGH (ref 7–23)
BUN SERPL-MCNC: 69 MG/DL — HIGH (ref 7–23)
CALCIUM SERPL-MCNC: 8.3 MG/DL — LOW (ref 8.4–10.5)
CALCIUM SERPL-MCNC: 8.6 MG/DL — SIGNIFICANT CHANGE UP (ref 8.4–10.5)
CHLORIDE SERPL-SCNC: 104 MMOL/L — SIGNIFICANT CHANGE UP (ref 96–108)
CHLORIDE SERPL-SCNC: 105 MMOL/L — SIGNIFICANT CHANGE UP (ref 96–108)
CO2 SERPL-SCNC: 14 MMOL/L — LOW (ref 22–31)
CO2 SERPL-SCNC: 14 MMOL/L — LOW (ref 22–31)
CREAT SERPL-MCNC: 4.31 MG/DL — HIGH (ref 0.5–1.3)
CREAT SERPL-MCNC: 4.71 MG/DL — HIGH (ref 0.5–1.3)
EOSINOPHIL # BLD AUTO: 0.09 K/UL — SIGNIFICANT CHANGE UP (ref 0–0.5)
EOSINOPHIL NFR BLD AUTO: 0.7 % — SIGNIFICANT CHANGE UP (ref 0–6)
GLUCOSE SERPL-MCNC: 113 MG/DL — HIGH (ref 70–99)
GLUCOSE SERPL-MCNC: 169 MG/DL — HIGH (ref 70–99)
HCT VFR BLD CALC: 24.5 % — LOW (ref 34.5–45)
HCT VFR BLD CALC: 25.1 % — LOW (ref 34.5–45)
HCV AB S/CO SERPL IA: 0.1 S/CO — SIGNIFICANT CHANGE UP
HCV AB SERPL-IMP: SIGNIFICANT CHANGE UP
HGB BLD-MCNC: 7.5 G/DL — LOW (ref 11.5–15.5)
HGB BLD-MCNC: 7.6 G/DL — LOW (ref 11.5–15.5)
IMM GRANULOCYTES NFR BLD AUTO: 1.3 % — SIGNIFICANT CHANGE UP (ref 0–1.5)
LYMPHOCYTES # BLD AUTO: 1.29 K/UL — SIGNIFICANT CHANGE UP (ref 1–3.3)
LYMPHOCYTES # BLD AUTO: 10.3 % — LOW (ref 13–44)
MAGNESIUM SERPL-MCNC: 1.7 MG/DL — SIGNIFICANT CHANGE UP (ref 1.6–2.6)
MAGNESIUM SERPL-MCNC: 1.7 MG/DL — SIGNIFICANT CHANGE UP (ref 1.6–2.6)
MCHC RBC-ENTMCNC: 28.5 PG — SIGNIFICANT CHANGE UP (ref 27–34)
MCHC RBC-ENTMCNC: 29 PG — SIGNIFICANT CHANGE UP (ref 27–34)
MCHC RBC-ENTMCNC: 30.3 GM/DL — LOW (ref 32–36)
MCHC RBC-ENTMCNC: 30.6 GM/DL — LOW (ref 32–36)
MCV RBC AUTO: 94 FL — SIGNIFICANT CHANGE UP (ref 80–100)
MCV RBC AUTO: 94.6 FL — SIGNIFICANT CHANGE UP (ref 80–100)
MONOCYTES # BLD AUTO: 0.65 K/UL — SIGNIFICANT CHANGE UP (ref 0–0.9)
MONOCYTES NFR BLD AUTO: 5.2 % — SIGNIFICANT CHANGE UP (ref 2–14)
NEUTROPHILS # BLD AUTO: 10.32 K/UL — HIGH (ref 1.8–7.4)
NEUTROPHILS NFR BLD AUTO: 82.3 % — HIGH (ref 43–77)
NRBC # BLD: 0 /100 WBCS — SIGNIFICANT CHANGE UP (ref 0–0)
NRBC # BLD: 0 /100 WBCS — SIGNIFICANT CHANGE UP (ref 0–0)
PHOSPHATE SERPL-MCNC: 4.3 MG/DL — SIGNIFICANT CHANGE UP (ref 2.5–4.5)
PHOSPHATE SERPL-MCNC: 4.4 MG/DL — SIGNIFICANT CHANGE UP (ref 2.5–4.5)
PLATELET # BLD AUTO: 196 K/UL — SIGNIFICANT CHANGE UP (ref 150–400)
PLATELET # BLD AUTO: 206 K/UL — SIGNIFICANT CHANGE UP (ref 150–400)
POTASSIUM SERPL-MCNC: 4.9 MMOL/L — SIGNIFICANT CHANGE UP (ref 3.5–5.3)
POTASSIUM SERPL-MCNC: 5.2 MMOL/L — SIGNIFICANT CHANGE UP (ref 3.5–5.3)
POTASSIUM SERPL-SCNC: 4.9 MMOL/L — SIGNIFICANT CHANGE UP (ref 3.5–5.3)
POTASSIUM SERPL-SCNC: 5.2 MMOL/L — SIGNIFICANT CHANGE UP (ref 3.5–5.3)
PROT SERPL-MCNC: 6.2 G/DL — SIGNIFICANT CHANGE UP (ref 6–8.3)
RBC # BLD: 2.59 M/UL — LOW (ref 3.8–5.2)
RBC # BLD: 2.67 M/UL — LOW (ref 3.8–5.2)
RBC # FLD: 18.1 % — HIGH (ref 10.3–14.5)
RBC # FLD: 18.2 % — HIGH (ref 10.3–14.5)
RH IG SCN BLD-IMP: POSITIVE — SIGNIFICANT CHANGE UP
SODIUM SERPL-SCNC: 136 MMOL/L — SIGNIFICANT CHANGE UP (ref 135–145)
SODIUM SERPL-SCNC: 137 MMOL/L — SIGNIFICANT CHANGE UP (ref 135–145)
TROPONIN T SERPL-MCNC: 0.09 NG/ML — CRITICAL HIGH (ref 0–0.01)
TSH SERPL-MCNC: 1.71 UIU/ML — SIGNIFICANT CHANGE UP (ref 0.35–4.94)
WBC # BLD: 10.92 K/UL — HIGH (ref 3.8–10.5)
WBC # BLD: 12.54 K/UL — HIGH (ref 3.8–10.5)
WBC # FLD AUTO: 10.92 K/UL — HIGH (ref 3.8–10.5)
WBC # FLD AUTO: 12.54 K/UL — HIGH (ref 3.8–10.5)

## 2019-04-25 PROCEDURE — 93970 EXTREMITY STUDY: CPT | Mod: 26

## 2019-04-25 PROCEDURE — 93306 TTE W/DOPPLER COMPLETE: CPT | Mod: 26

## 2019-04-25 PROCEDURE — 99233 SBSQ HOSP IP/OBS HIGH 50: CPT | Mod: GC

## 2019-04-25 PROCEDURE — 71045 X-RAY EXAM CHEST 1 VIEW: CPT | Mod: 26

## 2019-04-25 PROCEDURE — 76775 US EXAM ABDO BACK WALL LIM: CPT | Mod: 26

## 2019-04-25 RX ORDER — PIPERACILLIN AND TAZOBACTAM 4; .5 G/20ML; G/20ML
2.25 INJECTION, POWDER, LYOPHILIZED, FOR SOLUTION INTRAVENOUS EVERY 8 HOURS
Qty: 0 | Refills: 0 | Status: DISCONTINUED | OUTPATIENT
Start: 2019-04-25 | End: 2019-04-26

## 2019-04-25 RX ORDER — HEPARIN SODIUM 5000 [USP'U]/ML
600 INJECTION INTRAVENOUS; SUBCUTANEOUS
Qty: 25000 | Refills: 0 | Status: DISCONTINUED | OUTPATIENT
Start: 2019-04-25 | End: 2019-04-26

## 2019-04-25 RX ORDER — SODIUM CHLORIDE 9 MG/ML
1000 INJECTION INTRAMUSCULAR; INTRAVENOUS; SUBCUTANEOUS ONCE
Qty: 0 | Refills: 0 | Status: COMPLETED | OUTPATIENT
Start: 2019-04-25 | End: 2019-04-25

## 2019-04-25 RX ORDER — CHLORHEXIDINE GLUCONATE 213 G/1000ML
1 SOLUTION TOPICAL
Qty: 0 | Refills: 0 | Status: DISCONTINUED | OUTPATIENT
Start: 2019-04-25 | End: 2019-05-01

## 2019-04-25 RX ORDER — INFLUENZA VIRUS VACCINE 15; 15; 15; 15 UG/.5ML; UG/.5ML; UG/.5ML; UG/.5ML
0.5 SUSPENSION INTRAMUSCULAR ONCE
Qty: 0 | Refills: 0 | Status: DISCONTINUED | OUTPATIENT
Start: 2019-04-25 | End: 2019-05-01

## 2019-04-25 RX ORDER — HEPARIN SODIUM 5000 [USP'U]/ML
800 INJECTION INTRAVENOUS; SUBCUTANEOUS
Qty: 25000 | Refills: 0 | Status: DISCONTINUED | OUTPATIENT
Start: 2019-04-25 | End: 2019-04-25

## 2019-04-25 RX ADMIN — PIPERACILLIN AND TAZOBACTAM 200 GRAM(S): 4; .5 INJECTION, POWDER, LYOPHILIZED, FOR SOLUTION INTRAVENOUS at 13:54

## 2019-04-25 RX ADMIN — HEPARIN SODIUM 8 UNIT(S)/HR: 5000 INJECTION INTRAVENOUS; SUBCUTANEOUS at 11:29

## 2019-04-25 RX ADMIN — LEVETIRACETAM 500 MILLIGRAM(S): 250 TABLET, FILM COATED ORAL at 06:32

## 2019-04-25 RX ADMIN — SODIUM CHLORIDE 1000 MILLILITER(S): 9 INJECTION INTRAMUSCULAR; INTRAVENOUS; SUBCUTANEOUS at 23:00

## 2019-04-25 RX ADMIN — PIPERACILLIN AND TAZOBACTAM 200 GRAM(S): 4; .5 INJECTION, POWDER, LYOPHILIZED, FOR SOLUTION INTRAVENOUS at 06:31

## 2019-04-25 RX ADMIN — Medication 5.62 MICROGRAM(S)/KG/MIN: at 00:44

## 2019-04-25 RX ADMIN — Medication 250 MILLILITER(S): at 00:23

## 2019-04-25 RX ADMIN — SODIUM CHLORIDE 2000 MILLILITER(S): 9 INJECTION INTRAMUSCULAR; INTRAVENOUS; SUBCUTANEOUS at 00:06

## 2019-04-25 RX ADMIN — ATORVASTATIN CALCIUM 10 MILLIGRAM(S): 80 TABLET, FILM COATED ORAL at 22:18

## 2019-04-25 RX ADMIN — PIPERACILLIN AND TAZOBACTAM 200 GRAM(S): 4; .5 INJECTION, POWDER, LYOPHILIZED, FOR SOLUTION INTRAVENOUS at 22:18

## 2019-04-25 RX ADMIN — LEVETIRACETAM 500 MILLIGRAM(S): 250 TABLET, FILM COATED ORAL at 17:36

## 2019-04-25 RX ADMIN — MIRTAZAPINE 15 MILLIGRAM(S): 45 TABLET, ORALLY DISINTEGRATING ORAL at 22:24

## 2019-04-25 RX ADMIN — PANTOPRAZOLE SODIUM 40 MILLIGRAM(S): 20 TABLET, DELAYED RELEASE ORAL at 06:31

## 2019-04-25 RX ADMIN — DONEPEZIL HYDROCHLORIDE 10 MILLIGRAM(S): 10 TABLET, FILM COATED ORAL at 22:26

## 2019-04-25 RX ADMIN — Medication 5.62 MICROGRAM(S)/KG/MIN: at 13:40

## 2019-04-25 NOTE — PROGRESS NOTE ADULT - ASSESSMENT
74 yo F w PMHx CAD s/p stents (>10 years ago), HTN, HLD, anemia, seizure disorder (dx 01/2019) on Keppra, dementia with recent admission in 02/2019 for UTI who presents with ALE and electrolyte derangements in the setting of UTI and hypotension refractory to fluids and necessitating pressors.    NEURO  #Dementia: history of dementia, worsened since onset of seizures; as per daughter, mother not very aware of her surroundings at times and is occasionally nonverbal and lethargic (frequently sleeping) due to dementia and depression; as per daughter, current mental status is consistent with mother's norm  - c/w donepezil 10mg qhs  - c/w memantine 5mg qd    #Seizure: no seizures since first diagnosed in Jan  - continue w/ keppra 500mg bid  - holding home lacosamide for now due to renal dysfunction; consider epilepsy/pharmacist consult for proper dosing clarification    PULMONARY  Satting appropriately on room air    CARDIAC  #Shock: hypotensive refractory to 3L NS boluses; requiring pressors; as per last admission records, BP typically in the 120-130 range; patient significantly dry on exam and oliguric suggesting component of hypovolemia, however possible underlying infectious and iatrogenic component  -strict I/Os  - Cautious fluid repletion as pt s/p 3L NS  - C/w pressors, titrate to MAP >65  - Continue holding home antihypertensives (lisinopril, lopressor), baclofen, xanax for now     #CAD: s/p stents  - C/w atorvastatin 10mg qhs  - Continue holding lopressor 25mg bid and lisinopril 5mg qd in the setting of shock  - Echo: LV wall motion normal, EF 55-60%, normal LA/RA, calcified aortic valve minimal AS, trace TR, no pHTN. Ecoli grew in urine culture, vanc discontinued.      #Troponinemia: ECG w/ T wave peaking, however no evidence of ischemia; possibly 2/2 demand in the setting of shock and 2/2 decreased renal clearance  - Troponin plateau'd  - C/w cardiac telemetry    ID  #E. coli UTI: u/a suggestive of UTI; patient w/ recurrent UTI and hx nephrolithiasis; recently hospitalized in Feb for UTI and lethargy (s/p CTX tx) and Jan for seizure and UTI (s/p cefpodoxime tx); s/p CTX 2g in ED  - Urine culture grew E.coli  - C/w zosyn 2.25 IV (4/24 - 4/28)    RENAL  #Acute on chronic kidney failure: baseline 1.2; FeNa 0.7% suggesting prerenal; as per daughter, mother with decreased PO intake over the last several months with weight loss; very dry MM on PE; s/p 3L NS in the ED; patient also with apparent genitourinary infection likely contributing to renal dysfunction  - Trend BUN/Cr  - Avoid nephrotoxins, ACE/Arb, IV contrast  - Renally dose medications; certain home meds unable to be given due to renal dysfunction (llopurinol, lacosamide, lexapro); will reassess when to restart  - F/u renal u/s; r/o nephrolithiasis  - Strict I/Os    #Electrolyte derangements: patient w/ hyperkalemia, hyperphosphatemia, hyponatremia; likely primarily due to acute on chronic renal dysfunction  - Resolving  - Trend lytes q4h  - Consider renal consult if worsening electrolytes  - Tele monitoring    #Metabolic acidosis w/ elevated anion gap: likely due to uremia; lactate normal  -c/w maintenance fluids  -c/w telemetry monitoring    HEME  #DVT r/o: LLE pain for 2-3days; LLE swelling noted on PE; as per pulm fellow, bedside u/s w/ noncompressible at all 5 stations of LLE (common fem, saphenous intake, superficial and deep femoral, popliteal)  -f/u duplex u/s  -starting heparin gtt, goal ptt 60-80     #Normocytic anemia: hgb within patient's baseline; prior iron studies (Feb 2019) suggesting anemia of chronic disease  -continue to monitor     PSYCH  #Depression: diagnosed w/ depression; as per daughter, pt w/ decreased PO x 2-3mos and bouts of sleepiness, lethargy, and minimal interaction  -c/w remeron 15mg qhs    F: None  E: replete lytes as needed  N: DASH/renal diet pending dysphagia screen  LINES; peripheral IV's  GOMEZ: indwelling gomez placed in ED (4/24-)  DISPO: MICU  CODE: DNR/DNI (MOLST in chart); consider palliative care consult

## 2019-04-25 NOTE — PROGRESS NOTE ADULT - SUBJECTIVE AND OBJECTIVE BOX
Overnight Events:    Subjective: Patient seen and examined at bedside.    [OBJECTIVE]:    Vital Signs:  T(F): , Max: 97.9 (19 @ 14:55)  HR:  (50 - 82)  BP:  (71/45 - 164/58)  BP(mean):  (58 - 96)  RR:  (9 - 24)  SpO2:  (92% - 100%)  Wt(kg): --  CVP(cm H2O): --       @ 07:01  -   @ 06:21  --------------------------------------------------------  IN: 2363 mL / OUT: 190 mL / NET: 2173 mL      CAPILLARY BLOOD GLUCOSE      POCT Blood Glucose.: 179 mg/dL (2019 17:34)      Physcial Exam:  T(F): 97.6 (19 @ 01:03)  HR: 56 (19 @ 06:00)  BP: 136/66 (19 @ 05:00)  RR: 14 (19 @ 06:00)  SpO2: 100% (19 @ 06:00)  Wt(kg): --    General: AO x 3, NAD, Comfortable, Pleasant, Anxious, Agitated, Ill, Frail, Cachectic, Resp distress  HEENT: PERRL/ EOMI, no scleral icterus, no ptosis, MMM, no JVD, no thyromegaly  Respiratory: CTA b/l, no wheezes, rales or rhonchi  Cardiovascular: Regular, +S1 + S2  Abdomen: Soft, NTND, normoactive bowel sounds, no rebound, no guarding, no suprapubic tenderness  Extremities: No cyanosis, no clubbing, no edema, pulses equal, no calf tenderness  Skin: No rashes  Lymphatic: No cervical/supraclavicular LAD  Neurological: CN II-XII grossly intact, follows commands, moves all extremities    Medications:  MEDICATIONS  (STANDING):  atorvastatin 10 milliGRAM(s) Oral at bedtime  donepezil 10 milliGRAM(s) Oral at bedtime  ferrous    sulfate 325 milliGRAM(s) Oral daily  heparin  Infusion.  Unit(s)/Hr (11 mL/Hr) IV Continuous <Continuous>  levETIRAcetam 500 milliGRAM(s) Oral two times a day  memantine 5 milliGRAM(s) Oral daily  mirtazapine 15 milliGRAM(s) Oral at bedtime  norepinephrine Infusion 0.05 MICROgram(s)/kG/Min (5.625 mL/Hr) IV Continuous <Continuous>  pantoprazole    Tablet 40 milliGRAM(s) Oral before breakfast  piperacillin/tazobactam IVPB. 2.25 Gram(s) IV Intermittent every 12 hours    MEDICATIONS  (PRN):      Allergies:  Allergies    No Known Allergies    Intolerances        Labs:                        8.6    10.43 )-----------( 180      ( 2019 15:35 )             28.0         137  |  105  |  71<H>  ----------------------------<  134<H>  5.3   |  12<L>  |  5.35<H>    Ca    8.5      2019 21:31  Phos  4.5     -  Mg     1.8         TPro  6.1  /  Alb  3.0<L>  /  TBili  0.4  /  DBili  x   /  AST  13  /  ALT  <5<L>  /  AlkPhos  97  -24    PT/INR - ( 2019 15:35 )   PT: 12.9 sec;   INR: 1.14          PTT - ( 2019 15:35 )  PTT:32.9 sec  Urinalysis Basic - ( 2019 16:15 )    Color: Yellow / Appearance: Cloudy / S.025 / pH: x  Gluc: x / Ketone: Trace mg/dL  / Bili: Moderate / Urobili: 0.2 E.U./dL   Blood: x / Protein: 100 mg/dL / Nitrite: NEGATIVE   Leuk Esterase: Large / RBC: < 5 /HPF / WBC Many /HPF   Sq Epi: x / Non Sq Epi: 0-5 /HPF / Bacteria: Present /HPF        Radiology and other tests: Overnight Events: 5% albumin given, heparin started for DVT of LLE. Placed wrist restraints for agitation.    Subjective: Patient seen and examined at bedside. Wrist restraints removed this AM. Patient noted to be refusing medications, counseled and reassured patient to take her medications. Levo downtitrating. Urine culture postive for e.coli, vancomycin d/c. Echo revealing LV wall motion normal, EF 55-60% and a min AS/TR.    [OBJECTIVE]:    Vital Signs:  T(F): , Max: 97.9 (19 @ 14:55)  HR:  (50 - 82)  BP:  (71/45 - 164/58)  BP(mean):  (58 - 96)  RR:  (9 - 24)  SpO2:  (92% - 100%)  Wt(kg): --  CVP(cm H2O): --       @ 07:01  -   @ 06:21  --------------------------------------------------------  IN: 2363 mL / OUT: 190 mL / NET: 2173 mL      CAPILLARY BLOOD GLUCOSE      POCT Blood Glucose.: 179 mg/dL (2019 17:34)      Physcial Exam:  T(F): 97.6 (19 @ 01:03)  HR: 56 (19 @ 06:00)  BP: 136/66 (19 @ 05:00)  RR: 14 (19 @ 06:00)  SpO2: 100% (19 @ 06:00)  Wt(kg): --    General: Elderly appearing female, comfortable, AAOx1, NAD  HEENT: PERRL/ EOMI, no scleral icterus, DMM  Respiratory: CTA b/l, no wheezes, rales or rhonchi  Cardiovascular: Bradycardic, s1/s2, no murmurs, rubs or gallops  Abdomen: Soft, NTND, normoactive bowel sounds x4  Extremities: No cyanosis, no clubbing, b/l LE edema, LLE > RLE, 1+ DP pulses b/l, 2+ radial pulses b/l  Skin: No rashes  Lymphatic: No cervical/supraclavicular LAD  Neurological: AAOx1, follows commands, does not move lower extremities, good handgrip    Medications:  MEDICATIONS  (STANDING):  atorvastatin 10 milliGRAM(s) Oral at bedtime  donepezil 10 milliGRAM(s) Oral at bedtime  ferrous    sulfate 325 milliGRAM(s) Oral daily  heparin  Infusion.  Unit(s)/Hr (11 mL/Hr) IV Continuous <Continuous>  levETIRAcetam 500 milliGRAM(s) Oral two times a day  memantine 5 milliGRAM(s) Oral daily  mirtazapine 15 milliGRAM(s) Oral at bedtime  norepinephrine Infusion 0.05 MICROgram(s)/kG/Min (5.625 mL/Hr) IV Continuous <Continuous>  pantoprazole    Tablet 40 milliGRAM(s) Oral before breakfast  piperacillin/tazobactam IVPB. 2.25 Gram(s) IV Intermittent every 12 hours    MEDICATIONS  (PRN):      Allergies:  Allergies    No Known Allergies    Intolerances        Labs:                        8.6    10.43 )-----------( 180      ( 2019 15:35 )             28.0     -    137  |  105  |  71<H>  ----------------------------<  134<H>  5.3   |  12<L>  |  5.35<H>    Ca    8.5      2019 21:31  Phos  4.5     -24  Mg     1.8     -24    TPro  6.1  /  Alb  3.0<L>  /  TBili  0.4  /  DBili  x   /  AST  13  /  ALT  <5<L>  /  AlkPhos  97  04-24    PT/INR - ( 2019 15:35 )   PT: 12.9 sec;   INR: 1.14          PTT - ( 2019 15:35 )  PTT:32.9 sec  Urinalysis Basic - ( 2019 16:15 )    Color: Yellow / Appearance: Cloudy / S.025 / pH: x  Gluc: x / Ketone: Trace mg/dL  / Bili: Moderate / Urobili: 0.2 E.U./dL   Blood: x / Protein: 100 mg/dL / Nitrite: NEGATIVE   Leuk Esterase: Large / RBC: < 5 /HPF / WBC Many /HPF   Sq Epi: x / Non Sq Epi: 0-5 /HPF / Bacteria: Present /HPF      Radiology and other tests:  < from: Xray Chest 1 View- PORTABLE-Urgent (19 @ 07:36) >  Impression:    Minimal focal atelectasis left lung base. Lungs otherwise grossly clear.   No pneumothorax. Possible small left pleural effusion. No acute bone   abnormality. Limited rotated exam.

## 2019-04-26 DIAGNOSIS — Z71.89 OTHER SPECIFIED COUNSELING: ICD-10-CM

## 2019-04-26 DIAGNOSIS — R53.2 FUNCTIONAL QUADRIPLEGIA: ICD-10-CM

## 2019-04-26 DIAGNOSIS — I82.409 ACUTE EMBOLISM AND THROMBOSIS OF UNSPECIFIED DEEP VEINS OF UNSPECIFIED LOWER EXTREMITY: ICD-10-CM

## 2019-04-26 DIAGNOSIS — R56.9 UNSPECIFIED CONVULSIONS: ICD-10-CM

## 2019-04-26 DIAGNOSIS — Z51.5 ENCOUNTER FOR PALLIATIVE CARE: ICD-10-CM

## 2019-04-26 DIAGNOSIS — F32.9 MAJOR DEPRESSIVE DISORDER, SINGLE EPISODE, UNSPECIFIED: ICD-10-CM

## 2019-04-26 DIAGNOSIS — G93.40 ENCEPHALOPATHY, UNSPECIFIED: ICD-10-CM

## 2019-04-26 DIAGNOSIS — R41.9 UNSPECIFIED SYMPTOMS AND SIGNS INVOLVING COGNITIVE FUNCTIONS AND AWARENESS: ICD-10-CM

## 2019-04-26 DIAGNOSIS — N17.9 ACUTE KIDNEY FAILURE, UNSPECIFIED: ICD-10-CM

## 2019-04-26 DIAGNOSIS — A41.9 SEPSIS, UNSPECIFIED ORGANISM: ICD-10-CM

## 2019-04-26 LAB
-  AMPICILLIN/SULBACTAM: SIGNIFICANT CHANGE UP
-  AMPICILLIN: SIGNIFICANT CHANGE UP
-  CEFAZOLIN: SIGNIFICANT CHANGE UP
-  CEFTRIAXONE: SIGNIFICANT CHANGE UP
-  GENTAMICIN: SIGNIFICANT CHANGE UP
-  NITROFURANTOIN: SIGNIFICANT CHANGE UP
-  PIPERACILLIN/TAZOBACTAM: SIGNIFICANT CHANGE UP
-  TOBRAMYCIN: SIGNIFICANT CHANGE UP
-  TRIMETHOPRIM/SULFAMETHOXAZOLE: SIGNIFICANT CHANGE UP
ANION GAP SERPL CALC-SCNC: 17 MMOL/L — SIGNIFICANT CHANGE UP (ref 5–17)
APTT BLD: 102.1 SEC — HIGH (ref 27.5–36.3)
APTT BLD: 89.4 SEC — HIGH (ref 27.5–36.3)
APTT BLD: 91.8 SEC — HIGH (ref 27.5–36.3)
APTT BLD: <200 SEC — CRITICAL HIGH (ref 27.5–36.3)
BASOPHILS # BLD AUTO: 0.04 K/UL — SIGNIFICANT CHANGE UP (ref 0–0.2)
BASOPHILS NFR BLD AUTO: 0.7 % — SIGNIFICANT CHANGE UP (ref 0–2)
BUN SERPL-MCNC: 61 MG/DL — HIGH (ref 7–23)
CALCIUM SERPL-MCNC: 8.6 MG/DL — SIGNIFICANT CHANGE UP (ref 8.4–10.5)
CHLORIDE SERPL-SCNC: 106 MMOL/L — SIGNIFICANT CHANGE UP (ref 96–108)
CO2 SERPL-SCNC: 13 MMOL/L — LOW (ref 22–31)
CREAT SERPL-MCNC: 3.55 MG/DL — HIGH (ref 0.5–1.3)
EOSINOPHIL # BLD AUTO: 0.3 K/UL — SIGNIFICANT CHANGE UP (ref 0–0.5)
EOSINOPHIL NFR BLD AUTO: 5 % — SIGNIFICANT CHANGE UP (ref 0–6)
GLUCOSE SERPL-MCNC: 85 MG/DL — SIGNIFICANT CHANGE UP (ref 70–99)
HCT VFR BLD CALC: 25.2 % — LOW (ref 34.5–45)
HGB BLD-MCNC: 7.2 G/DL — LOW (ref 11.5–15.5)
IMM GRANULOCYTES NFR BLD AUTO: 0.7 % — SIGNIFICANT CHANGE UP (ref 0–1.5)
LEVETIRACETAM SERPL-MCNC: 34 MCG/ML — SIGNIFICANT CHANGE UP (ref 12–46)
LYMPHOCYTES # BLD AUTO: 1.13 K/UL — SIGNIFICANT CHANGE UP (ref 1–3.3)
LYMPHOCYTES # BLD AUTO: 19 % — SIGNIFICANT CHANGE UP (ref 13–44)
MAGNESIUM SERPL-MCNC: 1.6 MG/DL — SIGNIFICANT CHANGE UP (ref 1.6–2.6)
MCHC RBC-ENTMCNC: 28.1 PG — SIGNIFICANT CHANGE UP (ref 27–34)
MCHC RBC-ENTMCNC: 28.6 GM/DL — LOW (ref 32–36)
MCV RBC AUTO: 98.4 FL — SIGNIFICANT CHANGE UP (ref 80–100)
METHOD TYPE: SIGNIFICANT CHANGE UP
MONOCYTES # BLD AUTO: 0.36 K/UL — SIGNIFICANT CHANGE UP (ref 0–0.9)
MONOCYTES NFR BLD AUTO: 6.1 % — SIGNIFICANT CHANGE UP (ref 2–14)
NEUTROPHILS # BLD AUTO: 4.08 K/UL — SIGNIFICANT CHANGE UP (ref 1.8–7.4)
NEUTROPHILS NFR BLD AUTO: 68.5 % — SIGNIFICANT CHANGE UP (ref 43–77)
NRBC # BLD: 0 /100 WBCS — SIGNIFICANT CHANGE UP (ref 0–0)
PHOSPHATE SERPL-MCNC: 4 MG/DL — SIGNIFICANT CHANGE UP (ref 2.5–4.5)
PLATELET # BLD AUTO: 177 K/UL — SIGNIFICANT CHANGE UP (ref 150–400)
POTASSIUM SERPL-MCNC: 4.6 MMOL/L — SIGNIFICANT CHANGE UP (ref 3.5–5.3)
POTASSIUM SERPL-SCNC: 4.6 MMOL/L — SIGNIFICANT CHANGE UP (ref 3.5–5.3)
RBC # BLD: 2.56 M/UL — LOW (ref 3.8–5.2)
RBC # FLD: 18.6 % — HIGH (ref 10.3–14.5)
SODIUM SERPL-SCNC: 136 MMOL/L — SIGNIFICANT CHANGE UP (ref 135–145)
WBC # BLD: 5.95 K/UL — SIGNIFICANT CHANGE UP (ref 3.8–10.5)
WBC # FLD AUTO: 5.95 K/UL — SIGNIFICANT CHANGE UP (ref 3.8–10.5)

## 2019-04-26 PROCEDURE — 99223 1ST HOSP IP/OBS HIGH 75: CPT | Mod: GC

## 2019-04-26 PROCEDURE — 99223 1ST HOSP IP/OBS HIGH 75: CPT

## 2019-04-26 PROCEDURE — 99233 SBSQ HOSP IP/OBS HIGH 50: CPT | Mod: GC

## 2019-04-26 PROCEDURE — 99222 1ST HOSP IP/OBS MODERATE 55: CPT | Mod: GC

## 2019-04-26 RX ORDER — SODIUM CHLORIDE 9 MG/ML
1000 INJECTION INTRAMUSCULAR; INTRAVENOUS; SUBCUTANEOUS ONCE
Qty: 0 | Refills: 0 | Status: DISCONTINUED | OUTPATIENT
Start: 2019-04-26 | End: 2019-04-26

## 2019-04-26 RX ORDER — MIDODRINE HYDROCHLORIDE 2.5 MG/1
10 TABLET ORAL EVERY 8 HOURS
Qty: 0 | Refills: 0 | Status: DISCONTINUED | OUTPATIENT
Start: 2019-04-26 | End: 2019-05-01

## 2019-04-26 RX ORDER — MAGNESIUM SULFATE 500 MG/ML
1 VIAL (ML) INJECTION ONCE
Qty: 0 | Refills: 0 | Status: COMPLETED | OUTPATIENT
Start: 2019-04-26 | End: 2019-04-26

## 2019-04-26 RX ORDER — HEPARIN SODIUM 5000 [USP'U]/ML
500 INJECTION INTRAVENOUS; SUBCUTANEOUS
Qty: 25000 | Refills: 0 | Status: DISCONTINUED | OUTPATIENT
Start: 2019-04-26 | End: 2019-04-27

## 2019-04-26 RX ORDER — ERTAPENEM SODIUM 1 G/1
500 INJECTION, POWDER, LYOPHILIZED, FOR SOLUTION INTRAMUSCULAR; INTRAVENOUS EVERY 24 HOURS
Qty: 0 | Refills: 0 | Status: DISCONTINUED | OUTPATIENT
Start: 2019-04-26 | End: 2019-04-30

## 2019-04-26 RX ORDER — MEROPENEM 1 G/30ML
500 INJECTION INTRAVENOUS EVERY 12 HOURS
Qty: 0 | Refills: 0 | Status: COMPLETED | OUTPATIENT
Start: 2019-04-26 | End: 2019-04-26

## 2019-04-26 RX ORDER — MEROPENEM 1 G/30ML
500 INJECTION INTRAVENOUS EVERY 12 HOURS
Qty: 0 | Refills: 0 | Status: DISCONTINUED | OUTPATIENT
Start: 2019-04-26 | End: 2019-04-26

## 2019-04-26 RX ORDER — MEROPENEM 1 G/30ML
INJECTION INTRAVENOUS
Qty: 0 | Refills: 0 | Status: DISCONTINUED | OUTPATIENT
Start: 2019-04-26 | End: 2019-04-26

## 2019-04-26 RX ORDER — SODIUM CHLORIDE 9 MG/ML
1000 INJECTION INTRAMUSCULAR; INTRAVENOUS; SUBCUTANEOUS ONCE
Qty: 0 | Refills: 0 | Status: COMPLETED | OUTPATIENT
Start: 2019-04-26 | End: 2019-04-26

## 2019-04-26 RX ORDER — LACOSAMIDE 50 MG/1
150 TABLET ORAL
Qty: 0 | Refills: 0 | Status: DISCONTINUED | OUTPATIENT
Start: 2019-04-26 | End: 2019-04-28

## 2019-04-26 RX ADMIN — Medication 5.62 MICROGRAM(S)/KG/MIN: at 13:59

## 2019-04-26 RX ADMIN — LEVETIRACETAM 500 MILLIGRAM(S): 250 TABLET, FILM COATED ORAL at 06:17

## 2019-04-26 RX ADMIN — MEROPENEM 100 MILLIGRAM(S): 1 INJECTION INTRAVENOUS at 11:57

## 2019-04-26 RX ADMIN — DONEPEZIL HYDROCHLORIDE 10 MILLIGRAM(S): 10 TABLET, FILM COATED ORAL at 22:06

## 2019-04-26 RX ADMIN — Medication 325 MILLIGRAM(S): at 11:14

## 2019-04-26 RX ADMIN — MEMANTINE HYDROCHLORIDE 5 MILLIGRAM(S): 10 TABLET ORAL at 11:13

## 2019-04-26 RX ADMIN — LACOSAMIDE 150 MILLIGRAM(S): 50 TABLET ORAL at 17:19

## 2019-04-26 RX ADMIN — Medication 100 GRAM(S): at 10:22

## 2019-04-26 RX ADMIN — PANTOPRAZOLE SODIUM 40 MILLIGRAM(S): 20 TABLET, DELAYED RELEASE ORAL at 06:55

## 2019-04-26 RX ADMIN — ATORVASTATIN CALCIUM 10 MILLIGRAM(S): 80 TABLET, FILM COATED ORAL at 22:07

## 2019-04-26 RX ADMIN — HEPARIN SODIUM 6 UNIT(S)/HR: 5000 INJECTION INTRAVENOUS; SUBCUTANEOUS at 11:25

## 2019-04-26 RX ADMIN — PIPERACILLIN AND TAZOBACTAM 200 GRAM(S): 4; .5 INJECTION, POWDER, LYOPHILIZED, FOR SOLUTION INTRAVENOUS at 06:13

## 2019-04-26 RX ADMIN — MIRTAZAPINE 15 MILLIGRAM(S): 45 TABLET, ORALLY DISINTEGRATING ORAL at 22:07

## 2019-04-26 RX ADMIN — LEVETIRACETAM 500 MILLIGRAM(S): 250 TABLET, FILM COATED ORAL at 17:19

## 2019-04-26 RX ADMIN — ERTAPENEM SODIUM 100 MILLIGRAM(S): 1 INJECTION, POWDER, LYOPHILIZED, FOR SOLUTION INTRAMUSCULAR; INTRAVENOUS at 22:09

## 2019-04-26 RX ADMIN — SODIUM CHLORIDE 250 MILLILITER(S): 9 INJECTION INTRAMUSCULAR; INTRAVENOUS; SUBCUTANEOUS at 18:16

## 2019-04-26 RX ADMIN — CHLORHEXIDINE GLUCONATE 1 APPLICATION(S): 213 SOLUTION TOPICAL at 06:16

## 2019-04-26 NOTE — CONSULT NOTE ADULT - PROBLEM SELECTOR RECOMMENDATION 6
2/2 to acutely deconditioned state from underlying sepsis in the setting of advancing dementia. Requiring full nursing assistance, frequent repositioning, changing, skin care, oral care, and feeding. Recently d/c home with 24 hour [facility] via [facility].

## 2019-04-26 NOTE — DIETITIAN INITIAL EVALUATION ADULT. - ENERGY NEEDS
Height 60"; .8#; #; 139%IBW  BMI 27.1  Ideal body weight used for calculations as pt >120% of IBW. Needs estimated for maintenance in older adults; increased kcal for malnutrition; no increase in protein 2/2 ALE. Fluids per team 2/2 ALE.

## 2019-04-26 NOTE — BEHAVIORAL HEALTH ASSESSMENT NOTE - NSBHCHARTREVIEWVS_PSY_A_CORE FT
Vital Signs Last 24 Hrs  T(C): 36.4 (26 Apr 2019 09:10), Max: 37.1 (26 Apr 2019 01:04)  T(F): 97.5 (26 Apr 2019 09:10), Max: 98.7 (26 Apr 2019 01:04)  HR: 56 (26 Apr 2019 12:00) (52 - 64)  BP: 99/59 (26 Apr 2019 12:00) (70/47 - 136/83)  BP(mean): 70 (26 Apr 2019 12:00) (53 - 108)  RR: 17 (26 Apr 2019 12:00) (12 - 18)  SpO2: 99% (26 Apr 2019 12:00) (98% - 100%)

## 2019-04-26 NOTE — CHART NOTE - NSCHARTNOTEFT_GEN_A_CORE
Upon Nutritional Assessment by the Registered Dietitian your patient was determined to meet criteria / has evidence of the following diagnosis/diagnoses:          [ ]  Mild Protein Calorie Malnutrition        [X ]  Moderate Protein Calorie Malnutrition        [ ] Severe Protein Calorie Malnutrition        [ ] Unspecified Protein Calorie Malnutrition        [ ] Underweight / BMI <19        [ ] Morbid Obesity / BMI > 40      Findings as based on:  •  Comprehensive nutrition assessment and consultation    Per physical assessment: Muscle Wasting- Temporal [ X  ]  Clavicle/Pectoral [ X  ]  Shoulder/Deltoid [   ]  Scapula [   ]  Interosseous [   ]  Quadriceps [   ]  Gastrocnemius [   ]; Fat Wasting- Orbital [   ]  Buccal [   ]  Triceps [   ]  Rib [   ]--> Suspect moderate protein-calorie malnutrition 2/2 to physical assessment, inadequate energy intake of <75% for >1 month, and ~7.5% weight loss x 3 months     Treatment:    The following diet has been recommended:  DASH/TLC with Ensure Enlive BID (700 kcal, 40g protein, 360 mL free H2O) and MVI       PROVIDER Section:     By signing this assessment you are acknowledging and agree with the diagnosis/diagnoses assigned by the Registered Dietitian    Comments:

## 2019-04-26 NOTE — CONSULT NOTE ADULT - SUBJECTIVE AND OBJECTIVE BOX
NICKI GATES          MRN-[id number]            (1945)    HPI:  A 73 year old female with PMH CAD s/p stents (>10 years ago), HTN, HLD, anemia, seizure disorder (dx 2019; on Keppra), dementia, depression, recent admission in 2019 for UTI who presents from home after labs performed by home physician showed ALE (86/6.44, baseline Cr 1.23). Per patient's daughter, patient had bloodwork obtained for regular checkup on . PCP noted significantly elevated BUN/CR (compared to bloodwork from nursing home on ) and sent patient to ED. Daughter/aide has noticed decreased urination for possibly 2-3days (uncertain duration as pt uses diapers). Also patient has been complaining of LLE pain x3days. Today, with complaints of abdomen and back pain. No reports of f/c/n/v/d/c, CP, ab pain, dyspnea, dysuria, hematuria, bloody stools, melena, sick contacts. Of note, daughter reports pt has had kidney problems since childhood (unspecified) and frequently gets kidney stones and UTIs. Pt's mental status decreased since January after being having a seizure, however no acute alterations. Also per daughter, mother has been in nursing home since January until one week ago. She is currently at home with 24hr aide. For the last 2-3 months, has had decreased PO intake and weight loss.     HOSPITAL COURSE:   In ED, T: 97.9, P: 71, BP: 92/65, RR: 16, O2: 96% RA. Labs BUN/Cr: 86/6.44, K: 6.7, bicarb: 16, P: 5. ECG w/ peaking T waves. Gomez placed which drained only a few mL. U/a suggesting UTI. Patient was given ceftriaxone for UTI and insulin/d50/kayexalate for hyperkalemia. Patient's BP downtrended in the ED to sBP in 70's. Patient received 3L of NS with minimal improvement and ultimately started on Levo. (2019 19:38). Patient was then admitted to the MICU For further management of sepsis 2/2 urinary tract infection and acute kidney injury. Based on urine culture sensitivities was switched to Merrem, remains on minimal pressors (Levophed 0.06mcg). Per primary care team discussion with daughter- patient is DNR/DNI. Palliative consulted to further assist with GOC.     SUBJECTIVE:   Patient is alert, awake, responsive to questioning, disoriented to person, time, or place.  Lying in the bed, appears comfortable and in no apparent distress.    Communicative with few word phrases- able to vocalize complaints- reports discomfort in buttocks area- requested to be repositioned.  Denies any other complaints of pain, shortness of breath, nausea, vomiting.   Remains on IV antibiotics, and low dose pressors (Levophed), gomez catheter in place with clear urine +  Remains on contact isolation for ESBL+ in urine culture.  Daughter Lorrie Schmitt was present at bedside.     PAST MEDICAL & SURGICAL HISTORY:  Dementia  Seizure  Anemia  HLD (hyperlipidemia)  HTN (hypertension)  CAD (coronary artery disease)  S/P partial gastrectomy  H/O thyroidectomy    FAMILY HISTORY:  Family history of hypertension (Mother)  Family history of diabetes mellitus (Mother)    SOCIAL HISTORY: Patient is not , has one daughter Lorrie Schmitt, migrated from [facility] in . Notable decline in functional status since diagnosis of dementia 2 years ago, which further declined in 2019 after new onset seizures. Patient was in NH from January until April, was recently discharged home with 24H HHA by [place], [place]. Lives at home in elevator apartment building, is fully dependent in all activities of daily living and is primarily wheelchair bound. Patient reports not having an particular [employer] association.     ROS:            Dyspnea (Sujatha 0-10):    0/10               N/V (Y/N):  No                       Secretions (Y/N) : No                 Agitation(Y/N):  No - appears calm   Pain (Y/N):    Denies any pain- reported slight discomfort in buttocks- requested RN to reposition patient.   -Provocation/Palliation:  -Quality/Quantity:  -Radiating:  -Severity:  -Timing/Frequency:  -Impact on ADLs:    General:  Denied  HEENT:    Denied  Neck:  Denied  CVS:  Denied  Resp:  Denied  GI:  Denied  :  Denied  Musc:  Denied  Neuro:  Denied  Psych:  Denied  Skin:  Denied  Lymph:  Denied    ALLERGIES:     No Known Allergies    Intolerances    OPIATE NAIVE (Y/N): Yes    -iStop reviewed (Y/N): Yes (Ref#: [id number])  Rx found for Alprazolam 0.25mg, Vimpat 200mg.     MEDICATIONS:      MEDICATIONS  (STANDING):  atorvastatin 10 milliGRAM(s) Oral at bedtime  chlorhexidine 2% Cloths 1 Application(s) Topical <User Schedule>  donepezil 10 milliGRAM(s) Oral at bedtime  ertapenem  IVPB 500 milliGRAM(s) IV Intermittent every 24 hours  ferrous    sulfate 325 milliGRAM(s) Oral daily  heparin  Infusion 600 Unit(s)/Hr (6 mL/Hr) IV Continuous <Continuous>  influenza   Vaccine 0.5 milliLiter(s) IntraMuscular once  lacosamide 150 milliGRAM(s) Oral two times a day  levETIRAcetam 500 milliGRAM(s) Oral two times a day  memantine 5 milliGRAM(s) Oral daily  mirtazapine 15 milliGRAM(s) Oral at bedtime  norepinephrine Infusion 0.05 MICROgram(s)/kG/Min (5.625 mL/Hr) IV Continuous <Continuous>  pantoprazole    Tablet 40 milliGRAM(s) Oral before breakfast    MEDICATIONS  (PRN):    LABS:  Reviewed   CBC:                        7.2    5.95  )-----------( 177      ( 2019 09:04 )             25.2     CMP:        136  |  106  |  61<H>  ----------------------------<  85  4.6   |  13<L>  |  3.55<H>    Ca    8.6      2019 09:04  Phos  4.0       Mg     1.6         TPro  6.2  /  Alb  3.3  /  TBili  0.3  /  DBili  x   /  AST  12  /  ALT  <5<L>  /  AlkPhos  93  04-25    PT/INR - ( 2019 15:35 )   PT: 12.9 sec;   INR: 1.14       PTT - ( 2019 09:04 )  PTT:89.4 sec  Urinalysis Basic - ( 2019 16:15 )    Color: Yellow / Appearance: Cloudy / S.025 / pH: x  Gluc: x / Ketone: Trace mg/dL  / Bili: Moderate / Urobili: 0.2 E.U./dL   Blood: x / Protein: 100 mg/dL / Nitrite: NEGATIVE   Leuk Esterase: Large / RBC: < 5 /HPF / WBC Many /HPF   Sq Epi: x / Non Sq Epi: 0-5 /HPF / Bacteria: Present /HPF    Culture - Blood (collected 2019 22:05)  Source: .Blood None  Preliminary Report (2019 23:00):    No growth at 1 day.    Culture - Blood (collected 2019 21:47)  Source: .Blood Blood  Preliminary Report (2019 22:00):    No growth at 1 day.    Culture - Urine (collected 2019 17:34)  Source: Catheterized Catheterized  Preliminary Report (2019 10:03):    >100,000 CFU/ml Escherichia coli ESBL    Additional  Susceptibility to follow.  Organism: Escherichia coli ESBL (2019 10:01)  Organism: Escherichia coli ESBL (2019 10:01)    IMAGING:  Reviewed  Xray Chest 1 View- PORTABLE-Urgent (19 @ 07:36)  EXAM:  XR CHEST PORTABLE URGENT 1V                        PROCEDURE DATE:  2019    INTERPRETATION:  Portable chest  History: Shortness of breath  Impression:  Minimal focal atelectasis left lung base. Lungs otherwise grossly clear.   No pneumothorax. Possible small left pleural effusion. No acute bone   abnormality. Limited rotated exam.    US Duplex Venous Lower Ext Complete, Bilateral (19 @ 15:59)   EXAM:  US DPLX LWR EXT VEINS COMPL BI                        PROCEDURE DATE:  2019    INTERPRETATION:    VENOUS DUPLEX DOPPLER OF BOTH LOWER EXTREMITIES dated 2019  INDICATION: Left lower extremity swelling an pain. Evaluate for DVT.  TECHNIQUE: Duplex Doppler evaluation including gray-scale ultrasound   imaging, color flow Doppler imaging, and Doppler spectral analysis of the   veins of both lower extremities was performed.   COMPARISON: None  FINDINGS:  RIGHT LOWER EXTREMITY:  There is noncompressibility and lack of phasic flow within the RIGHT   common femoral, proximal femoral and proximal greater saphenous vein   consistent with deep venous thrombosis. The mid to distal femoral,   popliteal and proximal deep femoral veins are patent.  The paired RIGHT peroneal and posterior tibial calf veins are not   adequately visualized due to soft tissue swelling and edema.  LEFT LOWER EXTREMITY:  There is noncompressibility and lack of phasic flow within the LEFT   common femoral, femoral, popliteal, proximal deep femoral and proximal   greater saphenous veins consistent with deep vein thrombosis.  The paired LEFT peroneal and posterior tibial calf veins are not   adequately visualized due to soft tissue swelling and edema.  IMPRESSION:  Extensive bilateral deep vein thromboses as above.    US Retroperitoneal B-Scan Limited (19 @ 16:34)   EXAM:  US RETROPERITONEAL LIMITED                        PROCEDURE DATE:  2019    INTERPRETATION:  RENAL ULTRASOUND dated 2019  Indication: Acute kidney injury.  Comparison: None  IMPRESSION:  1.  Diffusely increased renal echogenicity bilaterally compatible with   medical renal disease.  2.  Bilateral renal cysts, the largest measures 2.2 cm in the right renal   midportion and contains a single thin internal septation (Bosniak II).  3.  1.2 cm cortically-based hyperechoic lesion in left lower pole kidney,   possibly angiomyolipoma but may be confirmed by renal protocol MRI or CT.    PEx:  T(C): 36.4 (19 @ 09:10), Max: 37.1 (19 @ 01:04)  HR: 56 (19 @ 12:00) (52 - 64)  BP: 99/59 (19 @ 12:00) (70/47 - 136/83)  RR: 17 (19 @ 12:00) (12 - 18)  SpO2: 99% (19 @ 12:00) (98% - 100%)  Wt(kg): --  Daily     Daily Weight in k.3 (2019 13:40)  CAPILLARY BLOOD GLUCOSE    POCT Blood Glucose.: 179 mg/dL (2019 17:34)    I&O's Summary    2019 07:  -  2019 07:00  --------------------------------------------------------  IN: 1593.5 mL / OUT: 1310 mL / NET: 283.5 mL    2019 07:  -  2019 13:58  --------------------------------------------------------  IN: 101.2 mL / OUT: 210 mL / NET: -108.8 mL    General: elderly, frail lady, alert/awake, disoriented to person, time, or place, but responsive/communicative in few word phrases.  HEENT:  normocephalic, atraumatic EOMI, PERRLA   Neck: neck supple   CVS: s1, s2+ regular rate and rhythm no audible murmurs   Resp: bilateral air entry, no wheezing, rales, or rhonchi   GI:  abdomen soft, nontender, nondistended, bowel sounds +   :  no suprapubic tenderness , gomez catheter in place + urine output   Musc:   weakness, +edema LLE, LE asymmetry L>R  Neuro:  no focal findings, able to move upper extremities, but has weakness b/l LE   Psych:   appears calm, no agitation   Skin: intact   Lymph: WNL   Preadmit Karnofsky: 30-40 %           Current Karnofsky:  30   %  Cachexia (Y/N): No   BMI: 27.1     ADVANCED DIRECTIVES:     DNR/DNI     MOLST    DECISION MAKER: Patient is currently not capacitated to make informed medical decisions for herself d/t underlying dementia.   LEGAL SURROGATE: Per [facility], next of kin would service as surrogate decision maker: Lorrie Schmitt [phone]    GOALS OF CARE DISCUSSION       Palliative care info/counseling provided	           Advanced Directives addressed please see Advance Care Planning Note	           Documentation of GOC: Daughter would like continue management of acute illness process- urosepsis wiht IV antibiotics and continued weaning off Levophed. Code status: DNR/DNI. Goal is to take patient home once she is medically optimized.           REFERRALS	        Palliative Med        Unit SW/Case Mgmt       Speech/Swallow       Patient/Family Support       Nutrition       PT/OT NICKI GATES          MRN-[id number]            (1945)    HPI:  A 73 year old female with PMH CAD s/p stents (>10 years ago), HTN, HLD, anemia, seizure disorder (dx 2019; on Keppra), dementia, depression, recent admission in 2019 for UTI who presents from home after labs performed by home physician showed ALE (86/6.44, baseline Cr 1.23). Per patient's daughter, patient had bloodwork obtained for regular checkup on . PCP noted significantly elevated BUN/CR (compared to bloodwork from nursing home on ) and sent patient to ED. Daughter/aide has noticed decreased urination for possibly 2-3days (uncertain duration as pt uses diapers). Also patient has been complaining of LLE pain x3days. Today, with complaints of abdomen and back pain. No reports of f/c/n/v/d/c, CP, ab pain, dyspnea, dysuria, hematuria, bloody stools, melena, sick contacts. Of note, daughter reports pt has had kidney problems since childhood (unspecified) and frequently gets kidney stones and UTIs. Pt's mental status decreased since January after being having a seizure, however no acute alterations. Also per daughter, mother has been in nursing home since January until one week ago. She is currently at home with 24hr aide. For the last 2-3 months, has had decreased PO intake and weight loss.     HOSPITAL COURSE:   In ED, T: 97.9, P: 71, BP: 92/65, RR: 16, O2: 96% RA. Labs BUN/Cr: 86/6.44, K: 6.7, bicarb: 16, P: 5. ECG w/ peaking T waves. Gomez placed which drained only a few mL. U/a suggesting UTI. Patient was given ceftriaxone for UTI and insulin/d50/kayexalate for hyperkalemia. Patient's BP downtrended in the ED to sBP in 70's. Patient received 3L of NS with minimal improvement and ultimately started on Levo. (2019 19:38). Patient was then admitted to the MICU For further management of sepsis 2/2 urinary tract infection and acute kidney injury. Based on urine culture sensitivities was switched to Merrem, remains on minimal pressors (Levophed 0.06mcg). Per primary care team discussion with daughter- patient is DNR/DNI. Palliative consulted to further assist with GOC.     SUBJECTIVE:   Patient is alert, awake, responsive to questioning, disoriented to person, time, or place.  Lying in the bed, appears comfortable and in no apparent distress.    Communicative with few word phrases- able to vocalize complaints- reports discomfort in buttocks area- requested to be repositioned.  Denies any other complaints of pain, shortness of breath, nausea, vomiting.   Remains on IV antibiotics, and low dose pressors (Levophed), gomez catheter in place with clear urine +  Remains on contact isolation for ESBL+ in urine culture.  Daughter Lorrie Schmitt was present at bedside.     PAST MEDICAL & SURGICAL HISTORY:  Dementia  Seizure  Anemia  HLD (hyperlipidemia)  HTN (hypertension)  CAD (coronary artery disease)  S/P partial gastrectomy  H/O thyroidectomy    FAMILY HISTORY:  Family history of hypertension (Mother)  Family history of diabetes mellitus (Mother)    SOCIAL HISTORY: Patient is not , has one daughter Lorrie Schmitt, migrated from [facility] in . Notable decline in functional status since diagnosis of dementia 2 years ago, which further declined in 2019 after new onset seizures. Patient was in NH from January until April, was recently discharged home with 24H HHA by [place], [place]. Lives at home in elevator apartment building, is fully dependent in all activities of daily living and is primarily wheelchair bound. Patient reports not having an particular [employer] association.     ROS:            Dyspnea (Sujatha 0-10):    0/10               N/V (Y/N):  No                       Secretions (Y/N) : No                 Agitation(Y/N):  No - appears calm   Pain (Y/N):    Denies any pain- reported slight discomfort in buttocks- requested RN to reposition patient.   -Provocation/Palliation:  -Quality/Quantity:  -Radiating:  -Severity:  -Timing/Frequency:  -Impact on ADLs:    General:  Denied  HEENT:    Denied  Neck:  Denied  CVS:  Denied  Resp:  Denied  GI:  Denied  :  Denied  Musc:  Denied  Neuro:  Denied  Psych:  Denied  Skin:  Denied  Lymph:  Denied    ALLERGIES:     No Known Allergies    Intolerances    OPIATE NAIVE (Y/N): Yes    -iStop reviewed (Y/N): Yes (Ref#: [id number])  Rx found for Alprazolam 0.25mg, Vimpat 200mg.     MEDICATIONS:      MEDICATIONS  (STANDING):  atorvastatin 10 milliGRAM(s) Oral at bedtime  chlorhexidine 2% Cloths 1 Application(s) Topical <User Schedule>  donepezil 10 milliGRAM(s) Oral at bedtime  ertapenem  IVPB 500 milliGRAM(s) IV Intermittent every 24 hours  ferrous    sulfate 325 milliGRAM(s) Oral daily  heparin  Infusion 600 Unit(s)/Hr (6 mL/Hr) IV Continuous <Continuous>  influenza   Vaccine 0.5 milliLiter(s) IntraMuscular once  lacosamide 150 milliGRAM(s) Oral two times a day  levETIRAcetam 500 milliGRAM(s) Oral two times a day  memantine 5 milliGRAM(s) Oral daily  mirtazapine 15 milliGRAM(s) Oral at bedtime  norepinephrine Infusion 0.05 MICROgram(s)/kG/Min (5.625 mL/Hr) IV Continuous <Continuous>  pantoprazole    Tablet 40 milliGRAM(s) Oral before breakfast    MEDICATIONS  (PRN):    LABS:  Reviewed   CBC:                        7.2    5.95  )-----------( 177      ( 2019 09:04 )             25.2     CMP:        136  |  106  |  61<H>  ----------------------------<  85  4.6   |  13<L>  |  3.55<H>    Ca    8.6      2019 09:04  Phos  4.0       Mg     1.6         TPro  6.2  /  Alb  3.3  /  TBili  0.3  /  DBili  x   /  AST  12  /  ALT  <5<L>  /  AlkPhos  93  04-25    PT/INR - ( 2019 15:35 )   PT: 12.9 sec;   INR: 1.14       PTT - ( 2019 09:04 )  PTT:89.4 sec  Urinalysis Basic - ( 2019 16:15 )    Color: Yellow / Appearance: Cloudy / S.025 / pH: x  Gluc: x / Ketone: Trace mg/dL  / Bili: Moderate / Urobili: 0.2 E.U./dL   Blood: x / Protein: 100 mg/dL / Nitrite: NEGATIVE   Leuk Esterase: Large / RBC: < 5 /HPF / WBC Many /HPF   Sq Epi: x / Non Sq Epi: 0-5 /HPF / Bacteria: Present /HPF    Culture - Blood (collected 2019 22:05)  Source: .Blood None  Preliminary Report (2019 23:00):    No growth at 1 day.    Culture - Blood (collected 2019 21:47)  Source: .Blood Blood  Preliminary Report (2019 22:00):    No growth at 1 day.    Culture - Urine (collected 2019 17:34)  Source: Catheterized Catheterized  Preliminary Report (2019 10:03):    >100,000 CFU/ml Escherichia coli ESBL    Additional  Susceptibility to follow.  Organism: Escherichia coli ESBL (2019 10:01)  Organism: Escherichia coli ESBL (2019 10:01)    IMAGING:  Reviewed  Xray Chest 1 View- PORTABLE-Urgent (19 @ 07:36)  EXAM:  XR CHEST PORTABLE URGENT 1V                        PROCEDURE DATE:  2019    INTERPRETATION:  Portable chest  History: Shortness of breath  Impression:  Minimal focal atelectasis left lung base. Lungs otherwise grossly clear.   No pneumothorax. Possible small left pleural effusion. No acute bone   abnormality. Limited rotated exam.    US Duplex Venous Lower Ext Complete, Bilateral (19 @ 15:59)   EXAM:  US DPLX LWR EXT VEINS COMPL BI                        PROCEDURE DATE:  2019    INTERPRETATION:    VENOUS DUPLEX DOPPLER OF BOTH LOWER EXTREMITIES dated 2019  INDICATION: Left lower extremity swelling an pain. Evaluate for DVT.  TECHNIQUE: Duplex Doppler evaluation including gray-scale ultrasound   imaging, color flow Doppler imaging, and Doppler spectral analysis of the   veins of both lower extremities was performed.   COMPARISON: None  FINDINGS:  RIGHT LOWER EXTREMITY:  There is noncompressibility and lack of phasic flow within the RIGHT   common femoral, proximal femoral and proximal greater saphenous vein   consistent with deep venous thrombosis. The mid to distal femoral,   popliteal and proximal deep femoral veins are patent.  The paired RIGHT peroneal and posterior tibial calf veins are not   adequately visualized due to soft tissue swelling and edema.  LEFT LOWER EXTREMITY:  There is noncompressibility and lack of phasic flow within the LEFT   common femoral, femoral, popliteal, proximal deep femoral and proximal   greater saphenous veins consistent with deep vein thrombosis.  The paired LEFT peroneal and posterior tibial calf veins are not   adequately visualized due to soft tissue swelling and edema.  IMPRESSION:  Extensive bilateral deep vein thromboses as above.    US Retroperitoneal B-Scan Limited (19 @ 16:34)   EXAM:  US RETROPERITONEAL LIMITED                        PROCEDURE DATE:  2019    INTERPRETATION:  RENAL ULTRASOUND dated 2019  Indication: Acute kidney injury.  Comparison: None  IMPRESSION:  1.  Diffusely increased renal echogenicity bilaterally compatible with   medical renal disease.  2.  Bilateral renal cysts, the largest measures 2.2 cm in the right renal   midportion and contains a single thin internal septation (Bosniak II).  3.  1.2 cm cortically-based hyperechoic lesion in left lower pole kidney,   possibly angiomyolipoma but may be confirmed by renal protocol MRI or CT.    PEx:  T(C): 36.4 (19 @ 09:10), Max: 37.1 (19 @ 01:04)  HR: 56 (19 @ 12:00) (52 - 64)  BP: 99/59 (19 @ 12:00) (70/47 - 136/83)  RR: 17 (19 @ 12:00) (12 - 18)  SpO2: 99% (19 @ 12:00) (98% - 100%)  Wt(kg): --  Daily     Daily Weight in k.3 (2019 13:40)  CAPILLARY BLOOD GLUCOSE    POCT Blood Glucose.: 179 mg/dL (2019 17:34)    I&O's Summary    2019 07:  -  2019 07:00  --------------------------------------------------------  IN: 1593.5 mL / OUT: 1310 mL / NET: 283.5 mL    2019 07:  -  2019 13:58  --------------------------------------------------------  IN: 101.2 mL / OUT: 210 mL / NET: -108.8 mL    General: elderly, frail lady, alert/awake, disoriented to person, time, or place, but responsive/communicative in few word phrases.  HEENT:  normocephalic, atraumatic EOMI, PERRLA   Neck: neck supple   CVS: s1, s2+ regular rate and rhythm no audible murmurs   Resp: bilateral air entry, no wheezing, rales, or rhonchi   GI:  abdomen soft, nontender, nondistended, bowel sounds +   :  no suprapubic tenderness , gomez catheter in place + urine output   Musc:   weakness, +edema LLE, LE asymmetry L>R  Neuro:  no focal findings, able to move upper extremities, but has weakness b/l LE   Psych:   appears calm, no agitation   Skin: intact   Lymph: WNL   Preadmit Karnofsky: 30-40 %           Current Karnofsky:  30   %  Cachexia (Y/N): No   BMI: 27.1     ADVANCED DIRECTIVES:     DNR/DNI     MOLST    DECISION MAKER: Patient is currently not capacitated to make informed medical decisions for herself d/t underlying dementia.   LEGAL SURROGATE: Per [facility], next of kin would service as surrogate decision maker: Lorrie Schimtt [phone]    GOALS OF CARE DISCUSSION       Palliative care info/counseling provided	           Advanced Directives addressed please see Advance Care Planning Note	           Documentation of GOC: Daughter would like continue management of acute illness process- urosepsis wiht IV antibiotics and continued weaning off Levophed. Code status: DNR/DNI. Goal is to take patient home once she is medically optimized again with 24 hour care          REFERRALS	        Palliative Med        Unit SW/Case Mgmt       Speech/Swallow       Patient/Family Support       Nutrition       PT/OT

## 2019-04-26 NOTE — BEHAVIORAL HEALTH ASSESSMENT NOTE - PROBLEM SELECTOR PLAN 1
Pt with chronic depression, without evidence of exacerbation  No SI/plan or intent noted.   Continue current med regiment. Would avoid benzodiazepines as they might exacerbate confusion. Plan to contact daughter to obtain collateral information.

## 2019-04-26 NOTE — DIETITIAN INITIAL EVALUATION ADULT. - FACTORS AFF FOOD INTAKE
difficulty with food procurement/preparation/other (specify)/persistent lack of appetite/recent hospitalizations, UTIs

## 2019-04-26 NOTE — BEHAVIORAL HEALTH ASSESSMENT NOTE - SUMMARY
73F w PMHx CAD s/p stents (>10 years ago), HTN, HLD, anemia, seizure disorder (dx 01/2019) on Keppra, dementia with recent admission in 02/2019 for UTI who presents with ALE and electrolyte derangements in the setting of UTI and hypotension refractory to fluids and necessitating pressors. Admit for mgmt of septic shock 2/2 ESBL E.coli UTI.  Pt is oriented to self only (which is her baseline) and is not a good historian. She reports chronic depressed mood, denying SI/plan or intent, stating she enjoys seeing her family. There is no evidence of acute risk. We will contact daughter for collateral info. Recommend continuing her current psychiatric medications.

## 2019-04-26 NOTE — CONSULT NOTE ADULT - ASSESSMENT
A 73 year old female with PMH CAD s/p stents (>10 years ago), HTN, HLD, anemia, seizure disorder (dx 01/2019; on Keppra), dementia, depression, recent admission in 02/2019 for UTI admitted to the MICU For further management of sepsis 2/2 urinary tract infection and acute kidney injury. Based on urine culture sensitivities was switched to Merrem, remains on minimal pressors (Levophed 0.06mcg). Per primary care team discussion with daughter- patient is DNR/DNI. Palliative consulted to further assist with GOC.

## 2019-04-26 NOTE — CONSULT NOTE ADULT - PROBLEM SELECTOR RECOMMENDATION 2
Secondary from underlying Urinary Tract infection.   Urine cultures + ESBL sensitivities pending.   Blood cultures no growth to date.   Infectious Disease recommendations- antibiotics switched to Ertapenem today.   On minimum pressor support - Levophed.

## 2019-04-26 NOTE — CONSULT NOTE ADULT - ASSESSMENT
73F PMH CAD s/p stents (>10 years ago), HTN, HLD, anemia, seizure disorder (dx 01/2019; on Keppra), dementia, depression, recent admission in 02/2019 for UTI who presents from home after labs performed by home physician showed acute renal failure, admitted to MICU for septic shock 2/2 ESBL e. coli    -The patient has a history of IV antibiotic use and hospitalizations for UTI so she is at risk for resistant organisms. Her urine culture is showing ESBL e. coli so will treat with ertapenem until we have susceptibilities from micro. She is currently requiring vasopressors. Bactrim is not an option because of her acute renal failure. Blood cultures show no growth to date.   -start ertapenem 500mg q24h  -f/u susceptibilities  -if the patient is febrile or rigoring obtain two sets of blood cultures    ID team 1 to follow 73F PMH CAD s/p stents (>10 years ago), HTN, HLD, anemia, seizure disorder (dx 01/2019; on Keppra), dementia, depression, recent admission in 02/2019 for UTI who presents from home after labs performed by home physician showed acute renal failure, admitted to MICU for septic shock 2/2 ESBL e. coli    -The patient has a history of IV antibiotic use and hospitalizations for UTI so she is at risk for resistant organisms. Her urine culture is showing ESBL e. coli so will treat with ertapenem until we have susceptibilities from micro. She is currently requiring vasopressors. Bactrim is not an option because of her acute renal failure. Blood cultures show no growth to date.   -start ertapenem 500mg q24h  -stop zosyn  -f/u susceptibilities  -if the patient is febrile or rigoring obtain two sets of blood cultures    ID team 1 to follow

## 2019-04-26 NOTE — CONSULT NOTE ADULT - PROBLEM SELECTOR RECOMMENDATION 3
In the setting of sepsis, hypotension.   Renal function improving, electrolytes stable.   Avoiding nephrotoxic medications.   Continue management per primary team.

## 2019-04-26 NOTE — BEHAVIORAL HEALTH ASSESSMENT NOTE - HPI (INCLUDE ILLNESS QUALITY, SEVERITY, DURATION, TIMING, CONTEXT, MODIFYING FACTORS, ASSOCIATED SIGNS AND SYMPTOMS)
72 yo [place] F w PMH CAD s/p stents (>10 years ago), HTN, HLD, anemia, seizure disorder (dx 01/2019; on Keppra), dementia, depression, recent admission in 02/2019 for UTI who presents from home after labs performed by home physician showed ALE (86/6.44, baseline Cr 1.23). Per patient's daughter, patient had blood work obtained for regular checkup on 4/23. PCP noted significantly elevated BUN/CR (compared to blood work from nursing home on 4/16) and sent patient to ED. Daughter/aide has noticed decreased urination for possibly 2-3days (uncertain duration as pt uses diapers). Also patient has been complaining of LLE pain x3days.  Pt's mental status decreased since January after having a seizure, however no acute alterations. Also per daughter, mother has been in nursing home since January until one week ago. She is currently at home with 24hr aide. For the last 2-3 months, has had decreased PO intake and weight loss. Pt is now admitted with urosepsis.   On exam pt is calm and cooperative, pleasant but disoriented to place/time, which is reported to be pt's baseline. Pt admits to feeling depressed for a "long time". She however denies SI/plan or intent, stating she enjoys seeing her children and grandchildren. Pt is exhibiting memory deficit and is unable to provide coherent history. She is not aware of seeing a psychiatrist in the past.   There is no evidence of acute psychotic symptoms or agitation.

## 2019-04-26 NOTE — CONSULT NOTE ADULT - PROBLEM SELECTOR RECOMMENDATION 7
Met with daughter Lorrie Schmitt (health care surrogate) who was present at bedside. Discussed patient's current medical condition, and treatment plan. Daughter was able to express an understanding of the patient's acute illness process and at this time would like to continue IV antibiotics for the urinary tract infection and continued pressor support as needed to maintain hemodynamic stablity. MOLST was previously addressed and completed by primary team- per treatment guidelines patient is DNR/DNI. Goal is to take patient home once she is medically optimized and for reinstatement of 24 hour HHA.  involvement. Palliative to continue to follow for supportive care and ongoing dispo plan.

## 2019-04-26 NOTE — DIETITIAN INITIAL EVALUATION ADULT. - OTHER INFO
73 yo/female with PMHx CAD, HTN, HLD, anemia, seizure disorder, dementia, depression, presented with ALE in setting of UTI. Found to have diffuse B/L LE DVTs as well. Pt seen in room and discussed during rounds. Pt oriented to self, able to answer questions appropriately though. Levophed turned off, MAP 67. She does endorse not eating well recently, unable to state why, but likely 2/2 depression. She reports enjoying hamburgers, sausage, potatoes at home. Currently w/<25% intake at breakfast observed. No complaints of N/V/C/D or pain. NKFA. Pt passed dysphagia screen, reports no difficulty w/chewing or swallowing. Skin intact but noted w/blanchable redness on gluteal folds. Pt denies pain at this time. Noted to have had 11# weight loss since admit in February. NFPE completed; insignificant for malnutrition, though 2/2 weight loss and poor PO intake pt can be identified w/moderate malnutrition. Pt amenable to trying ONS. Recommended to liberalize diet to remove renal restriction as lytes WNL. Education not appropriate 2/2 AMS.

## 2019-04-26 NOTE — DIETITIAN INITIAL EVALUATION ADULT. - NUTRITIONGOAL OUTCOME1
Pt will be encouraged to have >50% intake per meal; pt will have no further unintentional weight los

## 2019-04-26 NOTE — PROGRESS NOTE ADULT - SUBJECTIVE AND OBJECTIVE BOX
Overnight Events: INCOMPLETE NOTE    Subjective: Patient seen and examined at bedside.    [OBJECTIVE]:    Vital Signs:  T(F): , Max: 98.7 (19 @ 01:04)  HR:  (52 - 64)  BP:  (80/52 - 169/63)  BP(mean):  (59 - 108)  RR:  (10 - 23)  SpO2:  (98% - 100%)  Wt(kg): --  CVP(cm H2O): --       @ 07:01  -   @ 07:00  --------------------------------------------------------  IN: 2503 mL / OUT: 345 mL / NET: 2158 mL     @ 07:01  -   @ 06:22  --------------------------------------------------------  IN: 1577.5 mL / OUT: 1310 mL / NET: 267.5 mL      CAPILLARY BLOOD GLUCOSE      POCT Blood Glucose.: 179 mg/dL (2019 17:34)      Physcial Exam:  T(F): 97.7 (19 @ 05:03)  HR: 64 (19 @ 06:00)  BP: 136/83 (19 @ 06:00)  RR: 16 (19 @ 06:00)  SpO2: 98% (19 @ 06:00)  Wt(kg): --    General: AO x 3, NAD, Comfortable, Pleasant, Anxious, Agitated, Ill, Frail, Cachectic, Resp distress  HEENT: PERRL/ EOMI, no scleral icterus, no ptosis, MMM, no JVD, no thyromegaly  Respiratory: CTA b/l, no wheezes, rales or rhonchi  Cardiovascular: Regular, +S1 + S2  Abdomen: Soft, NTND, normoactive bowel sounds, no rebound, no guarding, no suprapubic tenderness  Extremities: No cyanosis, no clubbing, no edema, pulses equal, no calf tenderness  Skin: No rashes  Lymphatic: No cervical/supraclavicular LAD  Neurological: CN II-XII grossly intact, follows commands, moves all extremities    Medications:  MEDICATIONS  (STANDING):  atorvastatin 10 milliGRAM(s) Oral at bedtime  chlorhexidine 2% Cloths 1 Application(s) Topical <User Schedule>  donepezil 10 milliGRAM(s) Oral at bedtime  ferrous    sulfate 325 milliGRAM(s) Oral daily  heparin  Infusion 600 Unit(s)/Hr (6 mL/Hr) IV Continuous <Continuous>  influenza   Vaccine 0.5 milliLiter(s) IntraMuscular once  levETIRAcetam 500 milliGRAM(s) Oral two times a day  memantine 5 milliGRAM(s) Oral daily  mirtazapine 15 milliGRAM(s) Oral at bedtime  norepinephrine Infusion 0.05 MICROgram(s)/kG/Min (5.625 mL/Hr) IV Continuous <Continuous>  pantoprazole    Tablet 40 milliGRAM(s) Oral before breakfast  piperacillin/tazobactam IVPB. 2.25 Gram(s) IV Intermittent every 8 hours  sodium chloride 0.9% Bolus 1000 milliLiter(s) IV Bolus once    MEDICATIONS  (PRN):      Allergies:  Allergies    No Known Allergies    Intolerances        Labs:                        7.5    10.92 )-----------( 196      ( 2019 10:36 )             24.5         137  |  105  |  68<H>  ----------------------------<  113<H>  4.9   |  14<L>  |  4.31<H>    Ca    8.6      2019 17:29  Phos  4.3       Mg     1.7         TPro  6.2  /  Alb  3.3  /  TBili  0.3  /  DBili  x   /  AST  12  /  ALT  <5<L>  /  AlkPhos  93  25    PT/INR - ( 2019 15:35 )   PT: 12.9 sec;   INR: 1.14          PTT - ( 2019 00:10 )  PTT:91.8 sec  Urinalysis Basic - ( 2019 16:15 )    Color: Yellow / Appearance: Cloudy / S.025 / pH: x  Gluc: x / Ketone: Trace mg/dL  / Bili: Moderate / Urobili: 0.2 E.U./dL   Blood: x / Protein: 100 mg/dL / Nitrite: NEGATIVE   Leuk Esterase: Large / RBC: < 5 /HPF / WBC Many /HPF   Sq Epi: x / Non Sq Epi: 0-5 /HPF / Bacteria: Present /HPF        Radiology and other tests: Overnight Events: 1L NS bolus given with attempt to downtitrate levophed. , heparin held and restarted at 6cc/hr.    Subjective: Patient seen and examined at bedside. In good spirits this morning, not complaining of pain. Patient not eating much, encouraged pt to increase oral intake. No BM. Not complaining of any pain, denies f/c/n/v, abd pain, CP, and SOB. Noted during AM rounds by psych teaching attending that patient was expressing suicidal thoughts without a plan. Psych formal consult stated pt not suicidal, and some messages may have been lost in translation (primary language [place]). Palliative consulted for new goals of care.    [OBJECTIVE]:    Vital Signs:  T(F): , Max: 98.7 (19 @ 01:04)  HR:  (52 - 64)  BP:  (80/52 - 169/63)  BP(mean):  (59 - 108)  RR:  (10 - 23)  SpO2:  (98% - 100%)  Wt(kg): --  CVP(cm H2O): --       @ 07:01  -   @ 07:00  --------------------------------------------------------  IN: 2503 mL / OUT: 345 mL / NET: 2158 mL     @ 07:01  -   @ 06:22  --------------------------------------------------------  IN: 1577.5 mL / OUT: 1310 mL / NET: 267.5 mL      CAPILLARY BLOOD GLUCOSE      POCT Blood Glucose.: 179 mg/dL (2019 17:34)      Physical Exam:  T(F): 97.7 (19 @ 05:03)  HR: 64 (19 @ 06:00)  BP: 136/83 (19 @ 06:00)  RR: 16 (19 @ 06:00)  SpO2: 98% (19 @ 06:00)  Wt(kg): --    General: Elderly appearing female, comfortable, AAOx1, NAD, in good spirits this AM  HEENT: PERRL/ EOMI, no scleral icterus, DMM  Respiratory: CTA b/l, no wheezes, rales or rhonchi  Cardiovascular: Bradycardic, s1/s2, no murmurs, rubs or gallops  Abdomen: Soft, NTND, normoactive bowel sounds x4  Extremities: No cyanosis, no clubbing, b/l LE edema, LLE > RLE, 1+ DP pulses b/l, 2+ radial pulses b/l  Lymphatic: No cervical/supraclavicular LAD  Neurological: AAOx1 to name only, follows commands, does not want to move or lift her lower extremities, has good handgrip    Medications:  MEDICATIONS  (STANDING):  atorvastatin 10 milliGRAM(s) Oral at bedtime  chlorhexidine 2% Cloths 1 Application(s) Topical <User Schedule>  donepezil 10 milliGRAM(s) Oral at bedtime  ferrous    sulfate 325 milliGRAM(s) Oral daily  heparin  Infusion 600 Unit(s)/Hr (6 mL/Hr) IV Continuous <Continuous>  influenza   Vaccine 0.5 milliLiter(s) IntraMuscular once  levETIRAcetam 500 milliGRAM(s) Oral two times a day  memantine 5 milliGRAM(s) Oral daily  mirtazapine 15 milliGRAM(s) Oral at bedtime  norepinephrine Infusion 0.05 MICROgram(s)/kG/Min (5.625 mL/Hr) IV Continuous <Continuous>  pantoprazole    Tablet 40 milliGRAM(s) Oral before breakfast  piperacillin/tazobactam IVPB. 2.25 Gram(s) IV Intermittent every 8 hours  sodium chloride 0.9% Bolus 1000 milliLiter(s) IV Bolus once    MEDICATIONS  (PRN):    No Known Allergies    Intolerances        Labs:                        7.5    10.92 )-----------( 196      ( 2019 10:36 )             24.5         137  |  105  |  68<H>  ----------------------------<  113<H>  4.9   |  14<L>  |  4.31<H>    Ca    8.6      2019 17:29  Phos  4.3     -  Mg     1.7     -    TPro  6.2  /  Alb  3.3  /  TBili  0.3  /  DBili  x   /  AST  12  /  ALT  <5<L>  /  AlkPhos  93  -    PT/INR - ( 2019 15:35 )   PT: 12.9 sec;   INR: 1.14          PTT - ( 2019 00:10 )  PTT:91.8 sec  Urinalysis Basic - ( 2019 16:15 )    Color: Yellow / Appearance: Cloudy / S.025 / pH: x  Gluc: x / Ketone: Trace mg/dL  / Bili: Moderate / Urobili: 0.2 E.U./dL   Blood: x / Protein: 100 mg/dL / Nitrite: NEGATIVE   Leuk Esterase: Large / RBC: < 5 /HPF / WBC Many /HPF   Sq Epi: x / Non Sq Epi: 0-5 /HPF / Bacteria: Present /HPF    Radiology and other tests:  No new studies

## 2019-04-26 NOTE — PROGRESS NOTE ADULT - ASSESSMENT
73F w PMHx CAD s/p stents (>10 years ago), HTN, HLD, anemia, seizure disorder (dx 01/2019) on Keppra, dementia with recent admission in 02/2019 for UTI who presents with ALE and electrolyte derangements in the setting of UTI and hypotension refractory to fluids and necessitating pressors.    NEURO  #Dementia: history of dementia, worsened since onset of seizures; as per daughter, mother not very aware of her surroundings at times and is occasionally nonverbal and lethargic (frequently sleeping) due to dementia and depression; as per daughter, current mental status is consistent with mother's norm  - c/w donepezil 10mg qhs  - c/w memantine 5mg qd    #Seizure: no seizures since first diagnosed in Jan  - continue w/ keppra 500mg bid  - holding home lacosamide for now due to renal dysfunction; consider epilepsy/pharmacist consult for proper dosing clarification    PULMONARY  Satting appropriately on room air    CARDIAC  #Shock: hypotensive refractory to 3L NS boluses; requiring pressors; as per last admission records, BP typically in the 120-130 range; patient significantly dry on exam and oliguric suggesting component of hypovolemia, however possible underlying infectious and iatrogenic component  -strict I/Os  - Cautious fluid repletion as pt s/p 3L NS  - C/w pressors, titrate to MAP >65  - Continue holding home antihypertensives (lisinopril, lopressor), baclofen, xanax for now     #CAD: s/p stents  - C/w atorvastatin 10mg qhs  - Continue holding lopressor 25mg bid and lisinopril 5mg qd in the setting of shock  - Echo: LV wall motion normal, EF 55-60%, normal LA/RA, calcified aortic valve minimal AS, trace TR, no pHTN. Ecoli grew in urine culture, vanc discontinued.      #Troponinemia: ECG w/ T wave peaking, however no evidence of ischemia; possibly 2/2 demand in the setting of shock and 2/2 decreased renal clearance  - Troponin plateau'd  - C/w cardiac telemetry    ID  #E. coli UTI: u/a suggestive of UTI; patient w/ recurrent UTI and hx nephrolithiasis; recently hospitalized in Feb for UTI and lethargy (s/p CTX tx) and Jan for seizure and UTI (s/p cefpodoxime tx); s/p CTX 2g in ED  - Urine culture grew E.coli  - C/w zosyn 2.25 IV (4/24 - 4/28)    RENAL  #Acute on chronic kidney failure: baseline 1.2; FeNa 0.7% suggesting prerenal; as per daughter, mother with decreased PO intake over the last several months with weight loss; very dry MM on PE; s/p 3L NS in the ED; patient also with apparent genitourinary infection likely contributing to renal dysfunction  - Trend BUN/Cr  - Avoid nephrotoxins, ACE/Arb, IV contrast  - Renally dose medications; certain home meds unable to be given due to renal dysfunction (llopurinol, lacosamide, lexapro); will reassess when to restart  - F/u renal u/s; r/o nephrolithiasis  - Strict I/Os    #Electrolyte derangements: patient w/ hyperkalemia, hyperphosphatemia, hyponatremia; likely primarily due to acute on chronic renal dysfunction  - Resolving  - Trend lytes q4h  - Consider renal consult if worsening electrolytes  - Tele monitoring    #Metabolic acidosis w/ elevated anion gap: likely due to uremia; lactate normal  -c/w maintenance fluids  -c/w telemetry monitoring    HEME  #DVT r/o: LLE pain for 2-3days; LLE swelling noted on PE; as per pulm fellow, bedside u/s w/ noncompressible at all 5 stations of LLE (common fem, saphenous intake, superficial and deep femoral, popliteal)  -f/u duplex u/s  -starting heparin gtt, goal ptt 60-80     #Normocytic anemia: hgb within patient's baseline; prior iron studies (Feb 2019) suggesting anemia of chronic disease  -continue to monitor     PSYCH  #Depression: diagnosed w/ depression; as per daughter, pt w/ decreased PO x 2-3mos and bouts of sleepiness, lethargy, and minimal interaction  -c/w remeron 15mg qhs    F: None  E: replete lytes as needed  N: DASH/renal diet pending dysphagia screen  LINES; peripheral IV's  GOMEZ: indwelling gomez placed in ED (4/24-)  DISPO: MICU  CODE: DNR/DNI (MOLST in chart 73F w PMHx CAD s/p stents (>10 years ago), HTN, HLD, anemia, seizure disorder (dx 01/2019) on Keppra, dementia with recent admission in 02/2019 for UTI who presents with ALE and electrolyte derangements in the setting of UTI and hypotension refractory to fluids and necessitating pressors. Admit for mgmt of septic shock 2/2 ESBL E.coli UTI    NEURO  #Dementia: history of dementia, worsened since onset of seizures; as per daughter, mother not very aware of her surroundings at times and is occasionally nonverbal and lethargic (frequently sleeping) due to dementia and depression; as per daughter, current mental status is consistent with mother's norm  - c/w donepezil 10mg qhs  - c/w memantine 5mg qd    #Seizure: no seizures since first diagnosed in Jan  - C/w w/ keppra 500mg bid  - C/w lacosamide 150 mg BID (reduced by 75% given CrCl)    PULMONARY  Not active, O2%  on RA    CARDIAC  #Shock: hypotensive refractory to 3L NS boluses; requiring pressors; as per last admission records, BP typically in the 120-130 range; patient significantly dry on exam and oliguric suggesting component of hypovolemia, however possible underlying infectious and iatrogenic component  - C/w strict I/Os  - C/w pressors, titrate to MAP >65  - Continue holding home antihypertensives (lisinopril, lopressor), baclofen, xanax for now     #CAD: s/p stents  - C/w atorvastatin 10mg qhs  - Continue holding lopressor 25mg bid and lisinopril 5mg qd in the setting of shock  - Echo: LV wall motion normal, EF 55-60%, normal LA/RA, calcified aortic valve minimal AS, trace TR, no pHTN. Ecoli grew in urine culture, vanc discontinued.      #Troponinemia: ECG w/ T wave peaking, however no evidence of ischemia; possibly 2/2 demand in the setting of shock and 2/2 decreased renal clearance  - Troponin plateau'd  - C/w cardiac telemetry    ID  #Septic shock 2/2 ESBL E. coli UTI, noted to have a history of recurrent UTI and hx nephrolithiasis; recently hospitalized in Feb for UTI and lethargy (s/p CTX tx) and Jan for seizure and UTI (s/p cefpodoxime tx); s/p CTX 2g in ED. S/p zosyn 2.25 IV (4/24 - 4/26), this strain is resistant to zosyn  - C/w meropenem 500 mg IV q12h (4/26 - 4/30), dosed renally  - F/u palliative recs    RENAL  #Acute on chronic kidney failure: baseline 1.2; FeNa 0.7% suggesting prerenal; as per daughter, mother with decreased PO intake over the last several months with weight loss; very dry MM on PE; s/p 3L NS in the ED; patient also with apparent genitourinary infection likely contributing to renal dysfunction  - Continue to trend BUN/Cr  - Avoid nephrotoxins, ACE/Arb, IV contrast  - Renally dose medications  - Renal U/S showing bilateral renal cysts, medical renal disease, angiomyolipoma  - Strict I/Os    #Electrolyte derangements: patient w/ hyperkalemia, hyperphosphatemia, hyponatremia; likely primarily due to acute on chronic renal dysfunction  - Resolving, replete PRN  - Trend lytes q4h  - Consider renal consult if worsening electrolytes  - Tele monitoring    #Metabolic acidosis w/ elevated anion gap: likely due to uremia; lactate normal  -c/w maintenance fluids  -c/w telemetry monitoring    HEME  #DVT r/o: LLE pain for 2-3days; LLE swelling noted on PE; as per pulm fellow, bedside u/s w/ noncompressible at all 5 stations of LLE (common fem, saphenous intake, superficial and deep femoral, popliteal)  - BLE dopplers + for extensive DVTs b/l LE  -C/w heparin gtt, goal ptt 60-80    #Normocytic anemia: hgb within patient's baseline; prior iron studies (Feb 2019) suggesting anemia of chronic disease  - Continue to monitor     PSYCH  #Depression: diagnosed w/ depression; as per daughter, pt w/ decreased PO x 2-3mos and bouts of sleepiness, lethargy, and minimal interaction  - C/w remeron 15mg qhs  - Restart lexapro 10 mg once CrCl improves  - F/u psych recs    F: None  E: replete lytes as needed  N: DASH/renal diet pending dysphagia screen  LINES; peripheral IV's  GOMEZ: indwelling gomez placed in ED (4/24-)  DISPO: MICU  CODE: DNR/DNI (MOLST in chart) 73F w PMHx CAD s/p stents (>10 years ago), HTN, HLD, anemia, seizure disorder (dx 01/2019) on Keppra, dementia with recent admission in 02/2019 for UTI who presents with ALE and electrolyte derangements in the setting of UTI and hypotension refractory to fluids and necessitating pressors. Admit for mgmt of septic shock 2/2 ESBL E.coli UTI    NEURO  #Dementia: history of dementia, worsened since onset of seizures; as per daughter, mother not very aware of her surroundings at times and is occasionally nonverbal and lethargic (frequently sleeping) due to dementia and depression; as per daughter, current mental status is consistent with mother's norm  - c/w donepezil 10mg qhs  - c/w memantine 5mg qd    #Seizure: no seizures since first diagnosed in Jan  - C/w w/ keppra 500mg bid  - C/w lacosamide 150 mg BID (reduced by 75% given CrCl)    PULMONARY  Not active, O2%  on RA    CARDIAC  #Shock: hypotensive refractory to 3L NS boluses; requiring pressors; as per last admission records, BP typically in the 120-130 range; patient significantly dry on exam and oliguric suggesting component of hypovolemia, however possible underlying infectious and iatrogenic component  - C/w strict I/Os  - C/w pressors, titrate to MAP >65  - Continue holding home antihypertensives (lisinopril, lopressor), baclofen, xanax for now     #CAD: s/p stents  - C/w atorvastatin 10mg qhs  - Continue holding lopressor 25mg bid and lisinopril 5mg qd in the setting of shock  - Echo: LV wall motion normal, EF 55-60%, normal LA/RA, calcified aortic valve minimal AS, trace TR, no pHTN. Ecoli grew in urine culture, vanc discontinued.      #Troponinemia: ECG w/ T wave peaking, however no evidence of ischemia; possibly 2/2 demand in the setting of shock and 2/2 decreased renal clearance  - Troponin plateau'd  - C/w cardiac telemetry    ID  #Septic shock 2/2 ESBL E. coli UTI, noted to have a history of recurrent UTI and hx nephrolithiasis; recently hospitalized in Feb for UTI and lethargy (s/p CTX tx) and Jan for seizure and UTI (s/p cefpodoxime tx); s/p CTX 2g in ED. S/p zosyn 2.25 IV (4/24 - 4/26), this strain is resistant to zosyn  - Start for now meropenem 500 mg IV q12h (4/26 - 4/30), dosed renally  - F/u urine culture sensitivities for meropenem  - F/u palliative recs    RENAL  #Acute on chronic kidney failure: baseline 1.2; FeNa 0.7% suggesting prerenal; as per daughter, mother with decreased PO intake over the last several months with weight loss; very dry MM on PE; s/p 3L NS in the ED; patient also with apparent genitourinary infection likely contributing to renal dysfunction  - Continue to trend BUN/Cr  - Avoid nephrotoxins, ACE/Arb, IV contrast  - Renally dose medications  - Renal U/S showing bilateral renal cysts, medical renal disease, angiomyolipoma  - Strict I/Os    #Electrolyte derangements: patient w/ hyperkalemia, hyperphosphatemia, hyponatremia; likely primarily due to acute on chronic renal dysfunction  - Resolving, replete PRN  - Trend lytes q4h  - Consider renal consult if worsening electrolytes  - Tele monitoring    #Metabolic acidosis w/ elevated anion gap: likely due to uremia; lactate normal  -c/w maintenance fluids  -c/w telemetry monitoring    HEME  #DVT r/o: LLE pain for 2-3days; LLE swelling noted on PE; as per pulm fellow, bedside u/s w/ noncompressible at all 5 stations of LLE (common fem, saphenous intake, superficial and deep femoral, popliteal)  - BLE dopplers + for extensive DVTs b/l LE  -C/w heparin gtt, goal ptt 60-80    #Normocytic anemia: hgb within patient's baseline; prior iron studies (Feb 2019) suggesting anemia of chronic disease  - Continue to monitor     PSYCH  #Depression: diagnosed w/ depression; as per daughter, pt w/ decreased PO x 2-3mos and bouts of sleepiness, lethargy, and minimal interaction  - C/w remeron 15mg qhs  - Restart lexapro 10 mg once CrCl improves  - F/u psych recs    F: None  E: replete lytes as needed  N: DASH/renal diet pending dysphagia screen  LINES; peripheral IV's  GOMEZ: indwelling gomez placed in ED (4/24-)  DISPO: MICU  CODE: DNR/DNI (MOLST in chart) 73F w PMHx CAD s/p stents (>10 years ago), HTN, HLD, anemia, seizure disorder (dx 01/2019) on Keppra, dementia with recent admission in 02/2019 for UTI who presents with ALE and electrolyte derangements in the setting of UTI and hypotension refractory to fluids and necessitating pressors. Admit for mgmt of septic shock 2/2 ESBL E.coli UTI    NEURO  #Dementia: history of dementia, worsened since onset of seizures; as per daughter, mother not very aware of her surroundings at times and is occasionally nonverbal and lethargic (frequently sleeping) due to dementia and depression; as per daughter, current mental status is consistent with mother's norm  - c/w donepezil 10mg qhs  - c/w memantine 5mg qd    #Seizure: no seizures since first diagnosed in Jan  - C/w w/ keppra 500mg bid  - C/w lacosamide 150 mg BID (reduced by 75% given CrCl)    PULMONARY  Not active, O2%  on RA    CARDIAC  #Shock: hypotensive refractory to 3L NS boluses; requiring pressors; as per last admission records, BP typically in the 120-130 range; patient significantly dry on exam and oliguric suggesting component of hypovolemia, however possible underlying infectious and iatrogenic component  - C/w strict I/Os  - C/w pressors, titrate to MAP >65  - Continue holding home antihypertensives (lisinopril, lopressor), baclofen, xanax for now     #CAD: s/p stents  - C/w atorvastatin 10mg qhs  - Continue holding lopressor 25mg bid and lisinopril 5mg qd in the setting of shock  - Echo: LV wall motion normal, EF 55-60%, normal LA/RA, calcified aortic valve minimal AS, trace TR, no pHTN. Ecoli grew in urine culture, vanc discontinued.      #Troponinemia: ECG w/ T wave peaking, however no evidence of ischemia; possibly 2/2 demand in the setting of shock and 2/2 decreased renal clearance  - Troponin plateau'd  - C/w cardiac telemetry    ID  #Septic shock 2/2 ESBL E. coli UTI, noted to have a history of recurrent UTI and hx nephrolithiasis; recently hospitalized in Feb for UTI and lethargy (s/p CTX tx) and Jan for seizure and UTI (s/p cefpodoxime tx); s/p CTX 2g in ED. S/p zosyn 2.25 IV (4/24 - 4/26), this strain is resistant to zosyn  - Start for now meropenem 500 mg IV q12h for 1 dose switch to ertapenem  - C/w ertapenem 500 mg IV q24h (4/26 - 4/30), dosed renally  - F/u urine culture sensitivities for carbapenems  - F/u ID recs  - F/u palliative recs    RENAL  #Acute on chronic kidney failure: baseline 1.2; FeNa 0.7% suggesting prerenal; as per daughter, mother with decreased PO intake over the last several months with weight loss; very dry MM on PE; s/p 3L NS in the ED; patient also with apparent genitourinary infection likely contributing to renal dysfunction  - Continue to trend BUN/Cr  - Avoid nephrotoxins, ACE/Arb, IV contrast  - Renally dose medications  - Renal U/S showing bilateral renal cysts, medical renal disease, angiomyolipoma  - Strict I/Os    #Electrolyte derangements: patient w/ hyperkalemia, hyperphosphatemia, hyponatremia; likely primarily due to acute on chronic renal dysfunction  - Resolving, replete PRN  - Trend lytes q4h  - Consider renal consult if worsening electrolytes  - Tele monitoring    #Metabolic acidosis w/ elevated anion gap: likely due to uremia; lactate normal  -c/w maintenance fluids  -c/w telemetry monitoring    HEME  #DVT r/o: LLE pain for 2-3days; LLE swelling noted on PE; as per pulm fellow, bedside u/s w/ noncompressible at all 5 stations of LLE (common fem, saphenous intake, superficial and deep femoral, popliteal)  - BLE dopplers + for extensive DVTs b/l LE  -C/w heparin gtt, goal ptt 60-80    #Normocytic anemia: hgb within patient's baseline; prior iron studies (Feb 2019) suggesting anemia of chronic disease  - Continue to monitor     PSYCH  #Depression: diagnosed w/ depression; as per daughter, pt w/ decreased PO x 2-3mos and bouts of sleepiness, lethargy, and minimal interaction  - C/w remeron 15mg qhs  - Restart lexapro 10 mg once CrCl improves  - F/u psych recs    F: None  E: replete lytes as needed  N: DASH/renal diet pending dysphagia screen  LINES; peripheral IV's  GOMEZ: indwelling gomez placed in ED (4/24-)  DISPO: MICU  CODE: DNR/DNI (MOLST in chart) 73F w PMHx CAD s/p stents (>10 years ago), HTN, HLD, anemia, seizure disorder (dx 01/2019) on Keppra, dementia with recent admission in 02/2019 for UTI who presents with ALE and electrolyte derangements in the setting of UTI and hypotension refractory to fluids and necessitating pressors. Admit for mgmt of septic shock 2/2 ESBL E.coli UTI    NEURO  #Dementia: history of dementia, worsened since onset of seizures; as per daughter, mother not very aware of her surroundings at times and is occasionally nonverbal and lethargic (frequently sleeping) due to dementia and depression; as per daughter, current mental status is consistent with mother's norm  - c/w donepezil 10mg qhs  - c/w memantine 5mg qd    #Seizure: no seizures since first diagnosed in Jan  - C/w w/ keppra 500mg bid  - C/w lacosamide 150 mg BID (reduced by 75% given CrCl)    PULMONARY  Not active, O2%  on RA    CARDIAC  #Shock: hypotensive refractory to 3L NS boluses; requiring pressors; as per last admission records, BP typically in the 120-130 range; patient significantly dry on exam and oliguric suggesting component of hypovolemia, however possible underlying infectious and iatrogenic component  - C/w strict I/Os  - C/w pressors, titrate to MAP >65  - Continue holding home antihypertensives (lisinopril, lopressor), baclofen, xanax for now     #CAD: s/p stents  - C/w atorvastatin 10mg qhs  - Continue holding lopressor 25mg bid and lisinopril 5mg qd in the setting of shock  - Echo: LV wall motion normal, EF 55-60%, normal LA/RA, calcified aortic valve minimal AS, trace TR, no pHTN. Ecoli grew in urine culture, vanc discontinued.      #Troponinemia: ECG w/ T wave peaking, however no evidence of ischemia; possibly 2/2 demand in the setting of shock and 2/2 decreased renal clearance  - Troponin plateau'd  - C/w cardiac telemetry    ID  #Shock 2/2 ESBL E. coli UTI, noted to have a history of recurrent UTI and hx nephrolithiasis; recently hospitalized in Feb for UTI and lethargy (s/p CTX tx) and Jan for seizure and UTI (s/p cefpodoxime tx); s/p CTX 2g in ED. S/p zosyn 2.25 IV (4/24 - 4/26), this strain is resistant to zosyn  - Start for now meropenem 500 mg IV q12h for 1 dose switch to ertapenem  - C/w ertapenem 500 mg IV q24h (4/26 - 4/30), dosed renally  - F/u urine culture sensitivities for carbapenems  - F/u ID recs  - F/u palliative recs    RENAL  #Acute on chronic kidney failure stage IIIa: baseline CR 1.2, FeNa 0.7% suggesting prerenal; as per daughter, mother with decreased PO intake over the last several months with weight loss; very dry MM on PE; s/p 3L NS in the ED; patient also with apparent genitourinary infection likely contributing to renal dysfunction  - Continue to trend BUN/Cr  - Avoid nephrotoxins, ACE/Arb, IV contrast  - Renally dose medications  - Renal U/S showing bilateral renal cysts, medical renal disease, angiomyolipoma  - Strict I/Os    #Electrolyte derangements: patient w/ hyperkalemia, hyperphosphatemia, hyponatremia; likely primarily due to acute on chronic renal dysfunction  - Resolving, replete PRN  - Trend lytes q4h  - Consider renal consult if worsening electrolytes  - Tele monitoring    #Metabolic acidosis w/ elevated anion gap: likely due to uremia; lactate normal  -c/w maintenance fluids  -c/w telemetry monitoring    HEME  #DVT r/o: LLE pain for 2-3days; LLE swelling noted on PE; as per pulm fellow, bedside u/s w/ noncompressible at all 5 stations of LLE (common fem, saphenous intake, superficial and deep femoral, popliteal)  - BLE dopplers + for extensive DVTs b/l LE  -C/w heparin gtt, goal ptt 60-80    #Normocytic anemia: hgb within patient's baseline; prior iron studies (Feb 2019) suggesting anemia of chronic disease  - Continue to monitor     PSYCH  #Depression: diagnosed w/ depression; as per daughter, pt w/ decreased PO x 2-3mos and bouts of sleepiness, lethargy, and minimal interaction  - C/w remeron 15mg qhs  - Restart lexapro 10 mg once CrCl improves  - F/u psych recs    F: None  E: replete lytes as needed  N: DASH/renal diet pending dysphagia screen  LINES; peripheral IV's  GOMEZ: indwelling gomez placed in ED (4/24-)  DISPO: MICU  CODE: DNR/DNI (MOLST in chart)

## 2019-04-26 NOTE — CONSULT NOTE ADULT - SUBJECTIVE AND OBJECTIVE BOX
INFECTIOUS DISEASE SERVICE INITIAL CONSULT NOTE    HPI:  72 yo F w PMH CAD s/p stents (>10 years ago), HTN, HLD, anemia, seizure disorder (dx 2019; on Keppra), dementia, depression, recent admission in 2019 for UTI who presents from home after labs performed by home physician showed ALE (86/6.44, baseline Cr 1.23). Per patient's daughter, patient had bloodwork obtained for regular checkup on . PCP noted significantly elevated BUN/CR (compared to bloodwork from nursing home on ) and sent patient to ED. Daughter/aide has noticed decreased urination for possibly 2-3days (uncertain duration as pt uses diapers). Also patient has been complaining of LLE pain x3days. Today, with complaints of abdomen and back pain. No reports of f/c/n/v/d/c, CP, ab pain, dyspnea, dysuria, hematuria, bloody stools, melena, sick contacts. Of note, daughter reports pt has had kidney problems since childhood (unspecified) and frequently gets kidney stones and UTIs. Pt's mental status decreased since January after being having a seizure, however no acute alterations. Also per daughter, mother has been in nursing home since January until one week ago. She is currently at home with 24hr aide. For the last 2-3 months, has had decreased PO intake and weight loss.     In ED, T: 97.9, P: 71, BP: 92/65, RR: 16, O2: 96% RA. Labs BUN/Cr: 86/6.44, K: 6.7, bicarb: 16, P: 5. ECG w/ peaking T waves. Fierro placed which drained only a few mL. U/a suggesting UTI. Patient was given ceftriaxone for UTI and insulin/d50/kayexalate for hyperkalemia. Patient's BP downtrended in the ED to sBP in 70's. Patient received 3L of NS with minimal improvement and ultimately started on levo. (2019 19:38)      ADDITIONAL ID HPI:    PAST MEDICAL & SURGICAL HISTORY:  Dementia  Seizure  Anemia  HLD (hyperlipidemia)  HTN (hypertension)  CAD (coronary artery disease)  S/P partial gastrectomy  H/O thyroidectomy      REVIEW OF SYSTEMS:  Otherwise negative other than what is stated in the HPI.    ACTIVE ANTIMICROBIAL/ANTIBIOTIC MEDICATIONS:  meropenem  IVPB 500 milliGRAM(s) IV Intermittent every 12 hours  meropenem  IVPB 500 milliGRAM(s) IV Intermittent every 12 hours      OTHER MEDICATIONS:  atorvastatin 10 milliGRAM(s) Oral at bedtime  chlorhexidine 2% Cloths 1 Application(s) Topical <User Schedule>  donepezil 10 milliGRAM(s) Oral at bedtime  ferrous    sulfate 325 milliGRAM(s) Oral daily  heparin  Infusion 600 Unit(s)/Hr IV Continuous <Continuous>  influenza   Vaccine 0.5 milliLiter(s) IntraMuscular once  lacosamide 150 milliGRAM(s) Oral two times a day  levETIRAcetam 500 milliGRAM(s) Oral two times a day  memantine 5 milliGRAM(s) Oral daily  mirtazapine 15 milliGRAM(s) Oral at bedtime  norepinephrine Infusion 0.05 MICROgram(s)/kG/Min IV Continuous <Continuous>  pantoprazole    Tablet 40 milliGRAM(s) Oral before breakfast      ALLERGIES:  Allergies    No Known Allergies    Intolerances        SOCIAL HISTORY:    FAMILY HISTORY:  Family history of hypertension (Mother)  Family history of diabetes mellitus (Mother)      VITAL SIGNS:  ICU Vital Signs Last 24 Hrs  T(C): 36.4 (2019 09:10), Max: 37.1 (2019 01:04)  T(F): 97.5 (2019 09:10), Max: 98.7 (2019 01:04)  HR: 56 (2019 11:30) (52 - 64)  BP: 84/50 (2019 11:30) (70/47 - 136/83)  BP(mean): 58 (2019 11:30) (53 - 108)  ABP: --  ABP(mean): --  RR: 13 (2019 11:30) (12 - 23)  SpO2: 98% (2019 11:30) (98% - 100%)      PHYSICAL EXAM:  Constitutional: ill-appearing  Head: NC/AT  Eyes: PERRL; anicteric sclera  ENMT: no rhinorrhea; no sinus tenderness on palpation; no oropharyngeal lesions, erythema, or exudates	  Neck: supple; no JVD or LAD  Respiratory: CTA B/L  Cardiovascular: +S1/S2, RRR  Gastrointestinal: soft, ND; intact BS; no HSM, mild suprapubic/abdominal pain with deep palpation, no CVA tenderness  Extremities: WWP; no clubbing, cyanosis, pitting edema b/l  Vascular: 2+ radial, DP/PT pulses B/L  Dermatologic: skin warm and dry; no visible rashes or lesions  Neurologic: Alert and following commands    LABS:                        7.2    5.95  )-----------( 177      ( 2019 09:04 )             25.2         136  |  106  |  61<H>  ----------------------------<  85  4.6   |  13<L>  |  3.55<H>    Ca    8.6      2019 09:04  Phos  4.0       Mg     1.6         TPro  6.2  /  Alb  3.3  /  TBili  0.3  /  DBili  x   /  AST  12  /  ALT  <5<L>  /  AlkPhos  93      PT/INR - ( 2019 15:35 )   PT: 12.9 sec;   INR: 1.14          PTT - ( 2019 09:04 )  PTT:89.4 sec  Urinalysis Basic - ( 2019 16:15 )    Color: Yellow / Appearance: Cloudy / S.025 / pH: x  Gluc: x / Ketone: Trace mg/dL  / Bili: Moderate / Urobili: 0.2 E.U./dL   Blood: x / Protein: 100 mg/dL / Nitrite: NEGATIVE   Leuk Esterase: Large / RBC: < 5 /HPF / WBC Many /HPF   Sq Epi: x / Non Sq Epi: 0-5 /HPF / Bacteria: Present /HPF        MICROBIOLOGY:    Culture - Blood (collected 19 @ 22:05)  Source: .Blood None  Preliminary Report (19 @ 23:00):    No growth at 1 day.    Culture - Blood (collected 19 @ 21:47)  Source: .Blood Blood  Preliminary Report (19 @ 22:00):    No growth at 1 day.    Culture - Urine (collected 19 @ 17:34)  Source: Catheterized Catheterized  Preliminary Report (19 @ 10:03):    >100,000 CFU/ml Escherichia coli ESBL    Additional  Susceptibility to follow.  Organism: Escherichia coli ESBL (19 @ 10:01)  Organism: Escherichia coli ESBL (19 @ 10:01)      -  Ampicillin: R >16 These ampicillin results predict results for amoxicillin      -  Ampicillin/Sulbactam: R 16/8      -  Cefazolin: R >16 For uncomplicated UTI with K. pneumoniae, E. coli, or P. mirablis: MARISOL <=16 is sensitive and MARISOL >=32 is resistant. This also predicts results for oral agents cefaclor, cefdinir, cefpodoxime, cefprozil, cefuroxime axetil, cephalexin and locarbef for uncomplicated UTI. Note that some isolates may be susceptible to these agents while testing resistant to cefazolin.      -  Ceftriaxone: R 32 Enterobacter, Citrobacter, and Serratia may develop resistance during prolonged therapy      -  Gentamicin: S <=1      -  Nitrofurantoin: S <=32 Should not be used to treat pyelonephritis      -  Piperacillin/Tazobactam: R <=8      -  Tobramycin: S <=2      -  Trimethoprim/Sulfamethoxazole: S <=0.5/9.5      Method Type: MARISOL        RADIOLOGY & ADDITIONAL STUDIES: INFECTIOUS DISEASE SERVICE INITIAL CONSULT NOTE    HPI:  72 yo F w PMH CAD s/p stents (>10 years ago), HTN, HLD, anemia, seizure disorder (dx 2019; on Keppra), dementia, depression, recent admission in 2019 for UTI who presents from home after labs performed by home physician showed ALE (86/6.44, baseline Cr 1.23). Per patient's daughter, patient had bloodwork obtained for regular checkup on . PCP noted significantly elevated BUN/CR (compared to bloodwork from nursing home on ) and sent patient to ED. Daughter/aide has noticed decreased urination for possibly 2-3days (uncertain duration as pt uses diapers). Also patient has been complaining of LLE pain x3days. Today, with complaints of abdomen and back pain. No reports of f/c/n/v/d/c, CP, ab pain, dyspnea, dysuria, hematuria, bloody stools, melena, sick contacts. Of note, daughter reports pt has had kidney problems since childhood (unspecified) and frequently gets kidney stones and UTIs. Pt's mental status decreased since January after being having a seizure, however no acute alterations. Also per daughter, mother has been in nursing home since January until one week ago. She is currently at home with 24hr aide. For the last 2-3 months, has had decreased PO intake and weight loss.     In ED, T: 97.9, P: 71, BP: 92/65, RR: 16, O2: 96% RA. Labs BUN/Cr: 86/6.44, K: 6.7, bicarb: 16, P: 5. ECG w/ peaking T waves. Fierro placed which drained only a few mL. U/a suggesting UTI. Patient was given ceftriaxone for UTI and insulin/d50/kayexalate for hyperkalemia. Patient's BP downtrended in the ED to sBP in 70's. Patient received 3L of NS with minimal improvement and ultimately started on levo. (2019 19:38)      ADDITIONAL ID HPI: [id number] with history as stated above who presented after being found to have acute renal failure on outpatient labs. She does not recall any symptoms in the past several weeks but her daughter stated that she was having decreased urination and the patient at some point was endorsing LLE pain, back pain, and abdominal pain. She was noted to becoming increasingly hypotensive in the ED and required vasopressors. She was initially started on ceftriaxone then transitioned to vancomycin and zosyn then zosyn alone. She has intermittently required levophed. Her renal function is improving. ID consulted because urine cx grew ESBL e. coli. Of note she has been treated with IV antibiotics several times in the past year for UTIs.     PAST MEDICAL & SURGICAL HISTORY:  Dementia  Seizure  Anemia  HLD (hyperlipidemia)  HTN (hypertension)  CAD (coronary artery disease)  S/P partial gastrectomy  H/O thyroidectomy      REVIEW OF SYSTEMS:  Otherwise negative other than what is stated in the HPI.    ACTIVE ANTIMICROBIAL/ANTIBIOTIC MEDICATIONS:  meropenem  IVPB 500 milliGRAM(s) IV Intermittent every 12 hours  meropenem  IVPB 500 milliGRAM(s) IV Intermittent every 12 hours      OTHER MEDICATIONS:  atorvastatin 10 milliGRAM(s) Oral at bedtime  chlorhexidine 2% Cloths 1 Application(s) Topical <User Schedule>  donepezil 10 milliGRAM(s) Oral at bedtime  ferrous    sulfate 325 milliGRAM(s) Oral daily  heparin  Infusion 600 Unit(s)/Hr IV Continuous <Continuous>  influenza   Vaccine 0.5 milliLiter(s) IntraMuscular once  lacosamide 150 milliGRAM(s) Oral two times a day  levETIRAcetam 500 milliGRAM(s) Oral two times a day  memantine 5 milliGRAM(s) Oral daily  mirtazapine 15 milliGRAM(s) Oral at bedtime  norepinephrine Infusion 0.05 MICROgram(s)/kG/Min IV Continuous <Continuous>  pantoprazole    Tablet 40 milliGRAM(s) Oral before breakfast      ALLERGIES:  Allergies    No Known Allergies    Intolerances    SOCIAL HISTORY: Lives at home has visiting care. No tobacco, alcohol, drug use.     FAMILY HISTORY:  Family history of hypertension (Mother)  Family history of diabetes mellitus (Mother)      VITAL SIGNS:  ICU Vital Signs Last 24 Hrs  T(C): 36.4 (2019 09:10), Max: 37.1 (2019 01:04)  T(F): 97.5 (2019 09:10), Max: 98.7 (2019 01:04)  HR: 56 (2019 11:30) (52 - 64)  BP: 84/50 (2019 11:30) (70/47 - 136/83)  BP(mean): 58 (2019 11:30) (53 - 108)  ABP: --  ABP(mean): --  RR: 13 (2019 11:30) (12 - 23)  SpO2: 98% (2019 11:30) (98% - 100%)      PHYSICAL EXAM:  Constitutional: ill-appearing  Head: NC/AT  Eyes: PERRL; anicteric sclera  ENMT: no rhinorrhea; no sinus tenderness on palpation; no oropharyngeal lesions, erythema, or exudates	  Neck: supple; no JVD or LAD  Respiratory: CTA B/L  Cardiovascular: +S1/S2, RRR  Gastrointestinal: soft, ND; intact BS; no HSM, mild suprapubic/abdominal pain with deep palpation, no CVA tenderness  Extremities: WWP; no clubbing, cyanosis, pitting edema b/l  Vascular: 2+ radial, DP/PT pulses B/L  Dermatologic: skin warm and dry; no visible rashes or lesions  Neurologic: Alert and following commands    LABS:                        7.2    5.95  )-----------( 177      ( 2019 09:04 )             25.2     04-26    136  |  106  |  61<H>  ----------------------------<  85  4.6   |  13<L>  |  3.55<H>    Ca    8.6      2019 09:04  Phos  4.0     04-26  Mg     1.6     -26    TPro  6.2  /  Alb  3.3  /  TBili  0.3  /  DBili  x   /  AST  12  /  ALT  <5<L>  /  AlkPhos  93  04-25    PT/INR - ( 2019 15:35 )   PT: 12.9 sec;   INR: 1.14          PTT - ( 2019 09:04 )  PTT:89.4 sec  Urinalysis Basic - ( 2019 16:15 )    Color: Yellow / Appearance: Cloudy / S.025 / pH: x  Gluc: x / Ketone: Trace mg/dL  / Bili: Moderate / Urobili: 0.2 E.U./dL   Blood: x / Protein: 100 mg/dL / Nitrite: NEGATIVE   Leuk Esterase: Large / RBC: < 5 /HPF / WBC Many /HPF   Sq Epi: x / Non Sq Epi: 0-5 /HPF / Bacteria: Present /HPF        MICROBIOLOGY:    Culture - Blood (collected 19 @ 22:05)  Source: .Blood None  Preliminary Report (19 @ 23:00):    No growth at 1 day.    Culture - Blood (collected 19 @ 21:47)  Source: .Blood Blood  Preliminary Report (19 @ 22:00):    No growth at 1 day.    Culture - Urine (collected 19 @ 17:34)  Source: Catheterized Catheterized  Preliminary Report (19 @ 10:03):    >100,000 CFU/ml Escherichia coli ESBL    Additional  Susceptibility to follow.  Organism: Escherichia coli ESBL (19 @ 10:01)  Organism: Escherichia coli ESBL (19 @ 10:01)      -  Ampicillin: R >16 These ampicillin results predict results for amoxicillin      -  Ampicillin/Sulbactam: R 16/8      -  Cefazolin: R >16 For uncomplicated UTI with K. pneumoniae, E. coli, or P. mirablis: MARISOL <=16 is sensitive and MARISOL >=32 is resistant. This also predicts results for oral agents cefaclor, cefdinir, cefpodoxime, cefprozil, cefuroxime axetil, cephalexin and locarbef for uncomplicated UTI. Note that some isolates may be susceptible to these agents while testing resistant to cefazolin.      -  Ceftriaxone: R 32 Enterobacter, Citrobacter, and Serratia may develop resistance during prolonged therapy      -  Gentamicin: S <=1      -  Nitrofurantoin: S <=32 Should not be used to treat pyelonephritis      -  Piperacillin/Tazobactam: R <=8      -  Tobramycin: S <=2      -  Trimethoprim/Sulfamethoxazole: S <=0.5/9.5      Method Type: MARISOL        RADIOLOGY & ADDITIONAL STUDIES:

## 2019-04-26 NOTE — CONSULT NOTE ADULT - PROBLEM SELECTOR RECOMMENDATION 5
Diagnosed 1/2019. Since, has further decline in functional status.   No reported seizures since admission.   On Vimpat, and Keppra, seizure precautions.

## 2019-04-27 LAB
-  CIPROFLOXACIN: SIGNIFICANT CHANGE UP
ANION GAP SERPL CALC-SCNC: 13 MMOL/L — SIGNIFICANT CHANGE UP (ref 5–17)
APTT BLD: 56.6 SEC — HIGH (ref 27.5–36.3)
APTT BLD: 61.6 SEC — HIGH (ref 27.5–36.3)
APTT BLD: 77.7 SEC — HIGH (ref 27.5–36.3)
BUN SERPL-MCNC: 54 MG/DL — HIGH (ref 7–23)
CALCIUM SERPL-MCNC: 8.5 MG/DL — SIGNIFICANT CHANGE UP (ref 8.4–10.5)
CHLORIDE SERPL-SCNC: 110 MMOL/L — HIGH (ref 96–108)
CO2 SERPL-SCNC: 15 MMOL/L — LOW (ref 22–31)
CREAT SERPL-MCNC: 3 MG/DL — HIGH (ref 0.5–1.3)
CULTURE RESULTS: SIGNIFICANT CHANGE UP
GLUCOSE SERPL-MCNC: 85 MG/DL — SIGNIFICANT CHANGE UP (ref 70–99)
HCT VFR BLD CALC: 23.7 % — LOW (ref 34.5–45)
HGB BLD-MCNC: 7.4 G/DL — LOW (ref 11.5–15.5)
MAGNESIUM SERPL-MCNC: 1.7 MG/DL — SIGNIFICANT CHANGE UP (ref 1.6–2.6)
MCHC RBC-ENTMCNC: 28.4 PG — SIGNIFICANT CHANGE UP (ref 27–34)
MCHC RBC-ENTMCNC: 31.2 GM/DL — LOW (ref 32–36)
MCV RBC AUTO: 90.8 FL — SIGNIFICANT CHANGE UP (ref 80–100)
METHOD TYPE: SIGNIFICANT CHANGE UP
NRBC # BLD: 0 /100 WBCS — SIGNIFICANT CHANGE UP (ref 0–0)
ORGANISM # SPEC MICROSCOPIC CNT: SIGNIFICANT CHANGE UP
PLATELET # BLD AUTO: 244 K/UL — SIGNIFICANT CHANGE UP (ref 150–400)
POTASSIUM SERPL-MCNC: 4.6 MMOL/L — SIGNIFICANT CHANGE UP (ref 3.5–5.3)
POTASSIUM SERPL-SCNC: 4.6 MMOL/L — SIGNIFICANT CHANGE UP (ref 3.5–5.3)
RBC # BLD: 2.61 M/UL — LOW (ref 3.8–5.2)
RBC # FLD: 18.1 % — HIGH (ref 10.3–14.5)
SODIUM SERPL-SCNC: 138 MMOL/L — SIGNIFICANT CHANGE UP (ref 135–145)
SPECIMEN SOURCE: SIGNIFICANT CHANGE UP
WBC # BLD: 6.89 K/UL — SIGNIFICANT CHANGE UP (ref 3.8–10.5)
WBC # FLD AUTO: 6.89 K/UL — SIGNIFICANT CHANGE UP (ref 3.8–10.5)

## 2019-04-27 PROCEDURE — 99233 SBSQ HOSP IP/OBS HIGH 50: CPT | Mod: GC

## 2019-04-27 RX ORDER — HEPARIN SODIUM 5000 [USP'U]/ML
4 INJECTION INTRAVENOUS; SUBCUTANEOUS
Qty: 25000 | Refills: 0 | Status: DISCONTINUED | OUTPATIENT
Start: 2019-04-27 | End: 2019-04-27

## 2019-04-27 RX ORDER — HEPARIN SODIUM 5000 [USP'U]/ML
550 INJECTION INTRAVENOUS; SUBCUTANEOUS
Qty: 25000 | Refills: 0 | Status: DISCONTINUED | OUTPATIENT
Start: 2019-04-27 | End: 2019-04-28

## 2019-04-27 RX ORDER — ESCITALOPRAM OXALATE 10 MG/1
10 TABLET, FILM COATED ORAL DAILY
Qty: 0 | Refills: 0 | Status: DISCONTINUED | OUTPATIENT
Start: 2019-04-27 | End: 2019-05-01

## 2019-04-27 RX ORDER — MAGNESIUM SULFATE 500 MG/ML
1 VIAL (ML) INJECTION ONCE
Qty: 0 | Refills: 0 | Status: COMPLETED | OUTPATIENT
Start: 2019-04-27 | End: 2019-04-27

## 2019-04-27 RX ORDER — RISPERIDONE 4 MG/1
0.25 TABLET ORAL
Qty: 0 | Refills: 0 | Status: DISCONTINUED | OUTPATIENT
Start: 2019-04-27 | End: 2019-04-30

## 2019-04-27 RX ORDER — HEPARIN SODIUM 5000 [USP'U]/ML
500 INJECTION INTRAVENOUS; SUBCUTANEOUS
Qty: 25000 | Refills: 0 | Status: DISCONTINUED | OUTPATIENT
Start: 2019-04-27 | End: 2019-04-27

## 2019-04-27 RX ADMIN — LEVETIRACETAM 500 MILLIGRAM(S): 250 TABLET, FILM COATED ORAL at 19:15

## 2019-04-27 RX ADMIN — ATORVASTATIN CALCIUM 10 MILLIGRAM(S): 80 TABLET, FILM COATED ORAL at 22:21

## 2019-04-27 RX ADMIN — Medication 100 GRAM(S): at 07:53

## 2019-04-27 RX ADMIN — LACOSAMIDE 150 MILLIGRAM(S): 50 TABLET ORAL at 10:29

## 2019-04-27 RX ADMIN — MIDODRINE HYDROCHLORIDE 10 MILLIGRAM(S): 2.5 TABLET ORAL at 01:14

## 2019-04-27 RX ADMIN — LACOSAMIDE 150 MILLIGRAM(S): 50 TABLET ORAL at 19:14

## 2019-04-27 RX ADMIN — MEMANTINE HYDROCHLORIDE 5 MILLIGRAM(S): 10 TABLET ORAL at 19:15

## 2019-04-27 RX ADMIN — ESCITALOPRAM OXALATE 10 MILLIGRAM(S): 10 TABLET, FILM COATED ORAL at 10:30

## 2019-04-27 RX ADMIN — MIDODRINE HYDROCHLORIDE 10 MILLIGRAM(S): 2.5 TABLET ORAL at 10:29

## 2019-04-27 RX ADMIN — CHLORHEXIDINE GLUCONATE 1 APPLICATION(S): 213 SOLUTION TOPICAL at 06:36

## 2019-04-27 RX ADMIN — DONEPEZIL HYDROCHLORIDE 10 MILLIGRAM(S): 10 TABLET, FILM COATED ORAL at 22:20

## 2019-04-27 RX ADMIN — PANTOPRAZOLE SODIUM 40 MILLIGRAM(S): 20 TABLET, DELAYED RELEASE ORAL at 10:29

## 2019-04-27 RX ADMIN — LEVETIRACETAM 500 MILLIGRAM(S): 250 TABLET, FILM COATED ORAL at 10:29

## 2019-04-27 RX ADMIN — ERTAPENEM SODIUM 100 MILLIGRAM(S): 1 INJECTION, POWDER, LYOPHILIZED, FOR SOLUTION INTRAMUSCULAR; INTRAVENOUS at 23:42

## 2019-04-27 RX ADMIN — MIDODRINE HYDROCHLORIDE 10 MILLIGRAM(S): 2.5 TABLET ORAL at 19:14

## 2019-04-27 RX ADMIN — Medication 325 MILLIGRAM(S): at 10:30

## 2019-04-27 RX ADMIN — MIDODRINE HYDROCHLORIDE 10 MILLIGRAM(S): 2.5 TABLET ORAL at 23:42

## 2019-04-27 RX ADMIN — MIRTAZAPINE 15 MILLIGRAM(S): 45 TABLET, ORALLY DISINTEGRATING ORAL at 22:22

## 2019-04-27 RX ADMIN — RISPERIDONE 0.25 MILLIGRAM(S): 4 TABLET ORAL at 19:20

## 2019-04-27 NOTE — PHYSICAL THERAPY INITIAL EVALUATION ADULT - CRITERIA FOR SKILLED THERAPEUTIC INTERVENTIONS
anticipated discharge recommendation/impairments found/functional limitations in following categories/risk reduction/prevention/therapy frequency/rehab potential

## 2019-04-27 NOTE — PHYSICAL THERAPY INITIAL EVALUATION ADULT - PERTINENT HX OF CURRENT PROBLEM, REHAB EVAL
Patient is a 73 year old F PMH CAD s/p stents (>10 years ago), HTN, HLD, anemia, seizure disorder (dx 01/2019; on Keppra), dementia, depression, recent admission in 02/2019 for UTI who presents from home after labs performed by home physician showed ALE (86/6.44, baseline Cr 1.23).

## 2019-04-27 NOTE — PROGRESS NOTE BEHAVIORAL HEALTH - SUMMARY
Summary: 73F w PMHx CAD s/p stents (>10 years ago), HTN, HLD, anemia, seizure disorder (dx 01/2019) on Keppra, dementia with recent admission in 02/2019 for UTI who presents with ALE and electrolyte derangements in the setting of UTI and hypotension refractory to fluids and necessitating pressors. Admit for mgmt of septic shock 2/2 ESBL E.coli UTI.  Pt is oriented to self only (which is her baseline) and is not a good historian. She reports chronic depressed mood, denying SI/plan or intent, stating she enjoys seeing her family, however does endorse some element of chronic paranoia and intermittent AH.  - Recommend start risperdal 0.25mg PO BID for psychosis (monitor QTc and monitor blood pressure)  - Remeron 15mg and lexapro 10mg PO can be restarted at lower dose once mpatient medically stable    Differential: depressive disorder  neurocognitive disorder  r/o superimposed delirium. Summary: 73F w PMHx CAD s/p stents (>10 years ago), HTN, HLD, anemia, seizure disorder (dx 01/2019) on Keppra, dementia with recent admission in 02/2019 for UTI who presents with ALE and electrolyte derangements in the setting of UTI and hypotension refractory to fluids and necessitating pressors. Admit for mgmt of septic shock 2/2 ESBL E.coli UTI.  Pt is oriented to self only (which is her baseline) and is not a good historian. She reports chronic depressed mood, denying SI/plan or intent, stating she enjoys seeing her family, however does endorse some element of chronic paranoia and intermittent AH. On assessment patient is not AOx3, not able to verbalize reason for hospitalization or events leading up to hospitalization, and is unstable to discuss the reason and rationale for her treatment in the hospital. As such, patient does not meet criteria as per Appelbaum regarding capacity to make specific decisions about refusing medications necessary for her treatment.  - Patient does NOT have capacity to refuse medications necessary for her care  - Recommend start risperdal 0.25mg PO BID for psychosis (monitor QTc and monitor blood pressure)  - Remeron 15mg and lexapro 10mg PO can be restarted at lower dose once mpatient medically stable    Differential: depressive disorder  neurocognitive disorder  r/o superimposed delirium.

## 2019-04-27 NOTE — PHYSICAL THERAPY INITIAL EVALUATION ADULT - IMPAIRMENTS FOUND, PT EVAL
gross motor/aerobic capacity/endurance/arousal, attention, and cognition/posture/cognitive impairment/muscle strength/gait, locomotion, and balance

## 2019-04-27 NOTE — PHYSICAL THERAPY INITIAL EVALUATION ADULT - MANUAL MUSCLE TESTING RESULTS, REHAB EVAL
Strength grossly at least 3/5 based on functional assessment of bed mobility and therapeutic exercise

## 2019-04-27 NOTE — PROGRESS NOTE ADULT - SUBJECTIVE AND OBJECTIVE BOX
Overnight Events:    Subjective: Patient seen and examined at bedside.    [OBJECTIVE]:    Vital Signs:  T(F): , Max: 98.9 (04-26-19 @ 23:16)  HR:  (50 - 80)  BP:  (70/47 - 146/71)  BP(mean):  (53 - 94)  RR:  (0 - 24)  SpO2:  (96% - 100%)  Wt(kg): --  CVP(cm H2O): --      04-26 @ 07:01  -  04-27 @ 07:00  --------------------------------------------------------  IN: 527.3 mL / OUT: 1155 mL / NET: -627.7 mL    04-27 @ 07:01  -  04-27 @ 11:06  --------------------------------------------------------  IN: 10.2 mL / OUT: 0 mL / NET: 10.2 mL      CAPILLARY BLOOD GLUCOSE          Physcial Exam:  T(F): 96.6 (04-27-19 @ 09:18)  HR: 60 (04-27-19 @ 09:00)  BP: 104/63 (04-27-19 @ 09:00)  RR: 10 (04-27-19 @ 09:00)  SpO2: 98% (04-27-19 @ 09:00)  Wt(kg): --    General: AO x 3, NAD, Comfortable, Pleasant, Anxious, Agitated, Ill, Frail, Cachectic, Resp distress  HEENT: PERRL/ EOMI, no scleral icterus, no ptosis, MMM, no JVD, no thyromegaly  Respiratory: CTA b/l, no wheezes, rales or rhonchi  Cardiovascular: Regular, +S1 + S2  Abdomen: Soft, NTND, normoactive bowel sounds, no rebound, no guarding, no suprapubic tenderness  Extremities: No cyanosis, no clubbing, no edema, pulses equal, no calf tenderness  Skin: No rashes  Lymphatic: No cervical/supraclavicular LAD  Neurological: CN II-XII grossly intact, follows commands, moves all extremities    Medications:  MEDICATIONS  (STANDING):  atorvastatin 10 milliGRAM(s) Oral at bedtime  chlorhexidine 2% Cloths 1 Application(s) Topical <User Schedule>  donepezil 10 milliGRAM(s) Oral at bedtime  ertapenem  IVPB 500 milliGRAM(s) IV Intermittent every 24 hours  escitalopram 10 milliGRAM(s) Oral daily  ferrous    sulfate 325 milliGRAM(s) Oral daily  heparin  Infusion 550 Unit(s)/Hr (5.5 mL/Hr) IV Continuous <Continuous>  influenza   Vaccine 0.5 milliLiter(s) IntraMuscular once  lacosamide 150 milliGRAM(s) Oral two times a day  levETIRAcetam 500 milliGRAM(s) Oral two times a day  memantine 5 milliGRAM(s) Oral daily  midodrine 10 milliGRAM(s) Oral every 8 hours  mirtazapine 15 milliGRAM(s) Oral at bedtime  pantoprazole    Tablet 40 milliGRAM(s) Oral before breakfast    MEDICATIONS  (PRN):      Allergies:  Allergies    No Known Allergies    Intolerances        Labs:                        7.4    6.89  )-----------( 244      ( 27 Apr 2019 06:00 )             23.7     04-27    138  |  110<H>  |  54<H>  ----------------------------<  85  4.6   |  15<L>  |  3.00<H>    Ca    8.5      27 Apr 2019 06:00  Phos  4.0     04-26  Mg     1.7     04-27      PTT - ( 27 Apr 2019 02:34 )  PTT:56.6 sec      Radiology and other tests: Overnight Events: Overnight, patient patient had bladder scan showing urinary retention. Straight cath showed 900ccs of urine.    Subjective: Patient seen and examined at bedside. She states that she would like us to open the window and she would like to jump out. She is refusing to take her medications and is stating that we are against her and don't care about her.     [OBJECTIVE]:    Vital Signs:  T(F): , Max: 98.9 (04-26-19 @ 23:16)  HR:  (50 - 80)  BP:  (70/47 - 146/71)  BP(mean):  (53 - 94)  RR:  (0 - 24)  SpO2:  (96% - 100%)  Wt(kg): --  CVP(cm H2O): --      04-26 @ 07:01  -  04-27 @ 07:00  --------------------------------------------------------  IN: 527.3 mL / OUT: 1155 mL / NET: -627.7 mL    04-27 @ 07:01  -  04-27 @ 11:06  --------------------------------------------------------  IN: 10.2 mL / OUT: 0 mL / NET: 10.2 mL      CAPILLARY BLOOD GLUCOSE          Physical Exam:  T(F): 96.6 (04-27-19 @ 09:18)  HR: 60 (04-27-19 @ 09:00)  BP: 104/63 (04-27-19 @ 09:00)  RR: 10 (04-27-19 @ 09:00)  SpO2: 98% (04-27-19 @ 09:00)  Wt(kg): --    General: Elderly appearing female, comfortable, NAD w/ paranoia and suicidal ideation   HEENT: PERRL/ EOMI, no scleral icterus  Respiratory: CTA b/l, no wheezes, rales or rhonchi  Cardiovascular: Bradycardic, +S1/S2, no murmurs, rubs or gallops  Abdomen: Soft, NTND, normoactive bowel sounds x4  Extremities: No cyanosis, no clubbing, b/l LE edema, LLE > RLE, 1+ DP pulses b/l, 2+ radial pulses b/l  Lymphatic: No cervical/supraclavicular LAD  Neurological: AAOx1 to name only, follows commands; refusing to cooperate w/ MSK exam    Medications:  MEDICATIONS  (STANDING):  atorvastatin 10 milliGRAM(s) Oral at bedtime  chlorhexidine 2% Cloths 1 Application(s) Topical <User Schedule>  donepezil 10 milliGRAM(s) Oral at bedtime  ertapenem  IVPB 500 milliGRAM(s) IV Intermittent every 24 hours  escitalopram 10 milliGRAM(s) Oral daily  ferrous    sulfate 325 milliGRAM(s) Oral daily  heparin  Infusion 550 Unit(s)/Hr (5.5 mL/Hr) IV Continuous <Continuous>  influenza   Vaccine 0.5 milliLiter(s) IntraMuscular once  lacosamide 150 milliGRAM(s) Oral two times a day  levETIRAcetam 500 milliGRAM(s) Oral two times a day  memantine 5 milliGRAM(s) Oral daily  midodrine 10 milliGRAM(s) Oral every 8 hours  mirtazapine 15 milliGRAM(s) Oral at bedtime  pantoprazole    Tablet 40 milliGRAM(s) Oral before breakfast    MEDICATIONS  (PRN):      Allergies:  Allergies    No Known Allergies    Intolerances        Labs:                        7.4    6.89  )-----------( 244      ( 27 Apr 2019 06:00 )             23.7     04-27    138  |  110<H>  |  54<H>  ----------------------------<  85  4.6   |  15<L>  |  3.00<H>    Ca    8.5      27 Apr 2019 06:00  Phos  4.0     04-26  Mg     1.7     04-27      PTT - ( 27 Apr 2019 02:34 )  PTT:56.6 sec      Radiology and other tests:

## 2019-04-27 NOTE — PHYSICAL THERAPY INITIAL EVALUATION ADULT - GENERAL OBSERVATIONS, REHAB EVAL
Received semi-Sierra's position in bed with +tele, +pulse ox, on room air, +z-flow boots, +SCDs, +IV, +bed alarm, in no apparent distress. Patient appears to be resting comfortably in bed

## 2019-04-27 NOTE — PHYSICAL THERAPY INITIAL EVALUATION ADULT - PLANNED THERAPY INTERVENTIONS, PT EVAL
transfer training/balance training/bed mobility training/strengthening/gait training/postural re-education

## 2019-04-28 LAB
-  ERTAPENEM: SIGNIFICANT CHANGE UP
-  MEROPENEM: SIGNIFICANT CHANGE UP
ANION GAP SERPL CALC-SCNC: 17 MMOL/L — SIGNIFICANT CHANGE UP (ref 5–17)
APTT BLD: 148.5 SEC — CRITICAL HIGH (ref 27.5–36.3)
APTT BLD: 43.6 SEC — HIGH (ref 27.5–36.3)
APTT BLD: 61.9 SEC — HIGH (ref 27.5–36.3)
APTT BLD: 64.8 SEC — HIGH (ref 27.5–36.3)
BUN SERPL-MCNC: 51 MG/DL — HIGH (ref 7–23)
CALCIUM SERPL-MCNC: 9 MG/DL — SIGNIFICANT CHANGE UP (ref 8.4–10.5)
CHLORIDE SERPL-SCNC: 107 MMOL/L — SIGNIFICANT CHANGE UP (ref 96–108)
CO2 SERPL-SCNC: 12 MMOL/L — LOW (ref 22–31)
CREAT SERPL-MCNC: 2.55 MG/DL — HIGH (ref 0.5–1.3)
GLUCOSE SERPL-MCNC: 92 MG/DL — SIGNIFICANT CHANGE UP (ref 70–99)
HCT VFR BLD CALC: 27.7 % — LOW (ref 34.5–45)
HGB BLD-MCNC: 8.3 G/DL — LOW (ref 11.5–15.5)
MAGNESIUM SERPL-MCNC: 2 MG/DL — SIGNIFICANT CHANGE UP (ref 1.6–2.6)
MCHC RBC-ENTMCNC: 28.1 PG — SIGNIFICANT CHANGE UP (ref 27–34)
MCHC RBC-ENTMCNC: 30 GM/DL — LOW (ref 32–36)
MCV RBC AUTO: 93.9 FL — SIGNIFICANT CHANGE UP (ref 80–100)
NRBC # BLD: 0 /100 WBCS — SIGNIFICANT CHANGE UP (ref 0–0)
ORGANISM # SPEC MICROSCOPIC CNT: SIGNIFICANT CHANGE UP
PHOSPHATE SERPL-MCNC: 3.9 MG/DL — SIGNIFICANT CHANGE UP (ref 2.5–4.5)
PLATELET # BLD AUTO: 265 K/UL — SIGNIFICANT CHANGE UP (ref 150–400)
POTASSIUM SERPL-MCNC: 4.4 MMOL/L — SIGNIFICANT CHANGE UP (ref 3.5–5.3)
POTASSIUM SERPL-SCNC: 4.4 MMOL/L — SIGNIFICANT CHANGE UP (ref 3.5–5.3)
RBC # BLD: 2.95 M/UL — LOW (ref 3.8–5.2)
RBC # FLD: 18.3 % — HIGH (ref 10.3–14.5)
SODIUM SERPL-SCNC: 136 MMOL/L — SIGNIFICANT CHANGE UP (ref 135–145)
T4 FREE SERPL-MCNC: 0.97 NG/DL — SIGNIFICANT CHANGE UP (ref 0.7–1.48)
TSH SERPL-MCNC: 4.67 UIU/ML — SIGNIFICANT CHANGE UP (ref 0.35–4.94)
WBC # BLD: 7.69 K/UL — SIGNIFICANT CHANGE UP (ref 3.8–10.5)
WBC # FLD AUTO: 7.69 K/UL — SIGNIFICANT CHANGE UP (ref 3.8–10.5)

## 2019-04-28 PROCEDURE — 99233 SBSQ HOSP IP/OBS HIGH 50: CPT

## 2019-04-28 PROCEDURE — 93010 ELECTROCARDIOGRAM REPORT: CPT

## 2019-04-28 PROCEDURE — 99233 SBSQ HOSP IP/OBS HIGH 50: CPT | Mod: GC

## 2019-04-28 RX ORDER — SODIUM CHLORIDE 9 MG/ML
500 INJECTION INTRAMUSCULAR; INTRAVENOUS; SUBCUTANEOUS ONCE
Qty: 0 | Refills: 0 | Status: COMPLETED | OUTPATIENT
Start: 2019-04-28 | End: 2019-04-28

## 2019-04-28 RX ORDER — SODIUM CHLORIDE 9 MG/ML
1000 INJECTION INTRAMUSCULAR; INTRAVENOUS; SUBCUTANEOUS
Qty: 0 | Refills: 0 | Status: DISCONTINUED | OUTPATIENT
Start: 2019-04-28 | End: 2019-04-29

## 2019-04-28 RX ORDER — PANTOPRAZOLE SODIUM 20 MG/1
40 TABLET, DELAYED RELEASE ORAL DAILY
Qty: 0 | Refills: 0 | Status: DISCONTINUED | OUTPATIENT
Start: 2019-04-28 | End: 2019-04-30

## 2019-04-28 RX ORDER — LEVETIRACETAM 250 MG/1
500 TABLET, FILM COATED ORAL EVERY 12 HOURS
Qty: 0 | Refills: 0 | Status: DISCONTINUED | OUTPATIENT
Start: 2019-04-28 | End: 2019-04-30

## 2019-04-28 RX ORDER — LACOSAMIDE 50 MG/1
150 TABLET ORAL EVERY 12 HOURS
Qty: 0 | Refills: 0 | Status: DISCONTINUED | OUTPATIENT
Start: 2019-04-28 | End: 2019-04-30

## 2019-04-28 RX ORDER — HEPARIN SODIUM 5000 [USP'U]/ML
5000 INJECTION INTRAVENOUS; SUBCUTANEOUS EVERY 6 HOURS
Qty: 0 | Refills: 0 | Status: DISCONTINUED | OUTPATIENT
Start: 2019-04-28 | End: 2019-04-28

## 2019-04-28 RX ORDER — HEPARIN SODIUM 5000 [USP'U]/ML
5000 INJECTION INTRAVENOUS; SUBCUTANEOUS ONCE
Qty: 0 | Refills: 0 | Status: DISCONTINUED | OUTPATIENT
Start: 2019-04-28 | End: 2019-04-28

## 2019-04-28 RX ORDER — HEPARIN SODIUM 5000 [USP'U]/ML
700 INJECTION INTRAVENOUS; SUBCUTANEOUS
Qty: 25000 | Refills: 0 | Status: DISCONTINUED | OUTPATIENT
Start: 2019-04-28 | End: 2019-04-28

## 2019-04-28 RX ORDER — HEPARIN SODIUM 5000 [USP'U]/ML
2500 INJECTION INTRAVENOUS; SUBCUTANEOUS EVERY 6 HOURS
Qty: 0 | Refills: 0 | Status: DISCONTINUED | OUTPATIENT
Start: 2019-04-28 | End: 2019-04-28

## 2019-04-28 RX ORDER — HEPARIN SODIUM 5000 [USP'U]/ML
500 INJECTION INTRAVENOUS; SUBCUTANEOUS
Qty: 25000 | Refills: 0 | Status: DISCONTINUED | OUTPATIENT
Start: 2019-04-28 | End: 2019-04-29

## 2019-04-28 RX ADMIN — MEMANTINE HYDROCHLORIDE 5 MILLIGRAM(S): 10 TABLET ORAL at 19:17

## 2019-04-28 RX ADMIN — ATORVASTATIN CALCIUM 10 MILLIGRAM(S): 80 TABLET, FILM COATED ORAL at 21:12

## 2019-04-28 RX ADMIN — CHLORHEXIDINE GLUCONATE 1 APPLICATION(S): 213 SOLUTION TOPICAL at 06:06

## 2019-04-28 RX ADMIN — MIDODRINE HYDROCHLORIDE 10 MILLIGRAM(S): 2.5 TABLET ORAL at 19:16

## 2019-04-28 RX ADMIN — SODIUM CHLORIDE 250 MILLILITER(S): 9 INJECTION INTRAMUSCULAR; INTRAVENOUS; SUBCUTANEOUS at 15:17

## 2019-04-28 RX ADMIN — LEVETIRACETAM 400 MILLIGRAM(S): 250 TABLET, FILM COATED ORAL at 17:55

## 2019-04-28 RX ADMIN — SODIUM CHLORIDE 75 MILLILITER(S): 9 INJECTION INTRAMUSCULAR; INTRAVENOUS; SUBCUTANEOUS at 20:08

## 2019-04-28 RX ADMIN — ERTAPENEM SODIUM 100 MILLIGRAM(S): 1 INJECTION, POWDER, LYOPHILIZED, FOR SOLUTION INTRAMUSCULAR; INTRAVENOUS at 23:00

## 2019-04-28 RX ADMIN — ESCITALOPRAM OXALATE 10 MILLIGRAM(S): 10 TABLET, FILM COATED ORAL at 19:16

## 2019-04-28 RX ADMIN — DONEPEZIL HYDROCHLORIDE 10 MILLIGRAM(S): 10 TABLET, FILM COATED ORAL at 21:12

## 2019-04-28 RX ADMIN — RISPERIDONE 0.25 MILLIGRAM(S): 4 TABLET ORAL at 06:07

## 2019-04-28 RX ADMIN — LEVETIRACETAM 500 MILLIGRAM(S): 250 TABLET, FILM COATED ORAL at 06:07

## 2019-04-28 RX ADMIN — LACOSAMIDE 130 MILLIGRAM(S): 50 TABLET ORAL at 18:29

## 2019-04-28 RX ADMIN — HEPARIN SODIUM 700 UNIT(S)/HR: 5000 INJECTION INTRAVENOUS; SUBCUTANEOUS at 11:39

## 2019-04-28 RX ADMIN — MIDODRINE HYDROCHLORIDE 10 MILLIGRAM(S): 2.5 TABLET ORAL at 06:07

## 2019-04-28 RX ADMIN — MIRTAZAPINE 15 MILLIGRAM(S): 45 TABLET, ORALLY DISINTEGRATING ORAL at 21:12

## 2019-04-28 RX ADMIN — RISPERIDONE 0.25 MILLIGRAM(S): 4 TABLET ORAL at 19:17

## 2019-04-28 RX ADMIN — LACOSAMIDE 150 MILLIGRAM(S): 50 TABLET ORAL at 06:07

## 2019-04-28 RX ADMIN — PANTOPRAZOLE SODIUM 40 MILLIGRAM(S): 20 TABLET, DELAYED RELEASE ORAL at 06:07

## 2019-04-28 NOTE — PROGRESS NOTE ADULT - SUBJECTIVE AND OBJECTIVE BOX
INTERVAL HPI/OVERNIGHT EVENTS:    CONSTITUTIONAL:  No fever, chills, night sweats  EYES:  No photophobia or visual changes  CARDIOVASCULAR:  No chest pain  RESPIRATORY:  No cough, wheezing, or SOB   GASTROINTESTINAL:  No nausea, vomiting, diarrhea, constipation, or abdominal pain  GENITOURINARY:  No frequency, urgency, dysuria or hematuria  NEUROLOGIC:  No headache, lightheadedness      ANTIBIOTICS/RELEVANT:          Vital Signs Last 24 Hrs  T(C): 35.1 (28 Apr 2019 11:00), Max: 36.4 (28 Apr 2019 06:03)  T(F): 95.1 (28 Apr 2019 11:00), Max: 97.5 (28 Apr 2019 06:03)  HR: 60 (28 Apr 2019 12:00) (48 - 80)  BP: 112/65 (28 Apr 2019 11:00) (92/53 - 139/99)  BP(mean): 75 (28 Apr 2019 11:00) (64 - 123)  RR: 14 (28 Apr 2019 12:00) (8 - 29)  SpO2: 97% (28 Apr 2019 12:00) (93% - 100%)    PHYSICAL EXAM:  Constitutional:  Well-developed, well nourished  Eyes:  Sclerae anicterica, conjunctivae clear, PERRL  Ear/Nose/Throat:  No nasal exudate or sinus tenderness;  No buccal mucosal lesions, no pharyngeal erythema or exudate	  Neck:  Supple, no adenopathy  Respiratory:  Clear bilaterally  Cardiovascular:  RRR, S1S2, no murmur appreciated  Gastrointestinal:  Symmetric, normoactive BS, soft, NT, no masses, guarding or rebound.  No HSM  Extremities:  No edema      LABS:                        8.3    7.69  )-----------( 265      ( 28 Apr 2019 05:19 )             27.7         04-28    136  |  107  |  51<H>  ----------------------------<  92  4.4   |  12<L>  |  2.55<H>    Ca    9.0      28 Apr 2019 05:19  Phos  3.9     04-28  Mg     2.0     04-28            MICROBIOLOGY:  Blood cultures:  4/24 X 2- NGTD  Urine culture 4/24:  >10^5 E coli S Gm, nitrofurantoin, TM      RADIOLOGY & ADDITIONAL STUDIES: INTERVAL HPI/OVERNIGHT EVENTS:  Off pressors since yesterday morning.  She is currently hypothermic.    CONSTITUTIONAL:  No fever, chills, night sweats  EYES:  No photophobia or visual changes  CARDIOVASCULAR:  No chest pain  RESPIRATORY:  No cough, wheezing, or SOB   GASTROINTESTINAL:  No nausea, vomiting, diarrhea, constipation, or abdominal pain  GENITOURINARY:  No frequency, urgency, dysuria or hematuria  NEUROLOGIC:  No headache, lightheadedness      ANTIBIOTICS/RELEVANT:    Ertapenem 05. g IV q24h (4/26-present)    Meropenem 4/26  vancomycin 4/24  pip=-tazo 4/24-4/26      Vital Signs Last 24 Hrs  T(C): 35.1 (28 Apr 2019 11:00), Max: 36.4 (28 Apr 2019 06:03)  T(F): 95.1 (28 Apr 2019 11:00), Max: 97.5 (28 Apr 2019 06:03)  HR: 60 (28 Apr 2019 12:00) (48 - 80)  BP: 112/65 (28 Apr 2019 11:00) (92/53 - 139/99)  BP(mean): 75 (28 Apr 2019 11:00) (64 - 123)  RR: 14 (28 Apr 2019 12:00) (8 - 29)  SpO2: 97% (28 Apr 2019 12:00) (93% - 100%)    PHYSICAL EXAM:  Constitutional:  Well-developed, well nourished  Eyes:  Sclerae anicterica, conjunctivae clear, PERRL  Ear/Nose/Throat:  No nasal exudate or sinus tenderness;  No buccal mucosal lesions, no pharyngeal erythema or exudate	  Neck:  Supple, no adenopathy  Respiratory:  Clear bilaterally  Cardiovascular:  RRR, S1S2, no murmur appreciated  Gastrointestinal:  Symmetric, normoactive BS, soft, NT, no masses, guarding or rebound.  No HSM  Extremities:  No edema      LABS:                        8.3    7.69  )-----------( 265      ( 28 Apr 2019 05:19 )             27.7         04-28    136  |  107  |  51<H>  ----------------------------<  92  4.4   |  12<L>  |  2.55<H>    Ca    9.0      28 Apr 2019 05:19  Phos  3.9     04-28  Mg     2.0     04-28            MICROBIOLOGY:  Blood cultures:  4/24 X 2- NGTD  Urine culture 4/24:  >10^5 E coli S Gm, nitrofurantoin, TM, erta, earnest      RADIOLOGY & ADDITIONAL STUDIES:  No new studies INTERVAL HPI/OVERNIGHT EVENTS:  Off pressors since yesterday morning.  She is currently hypothermic.    ROS:  unobtainable - is lethargic, not really answering questions      ANTIBIOTICS/RELEVANT:    Ertapenem 05. g IV q24h (4/26-present)    Meropenem 4/26  vancomycin 4/24  pip=-tazo 4/24-4/26      Vital Signs Last 24 Hrs  T(C): 35.1 (28 Apr 2019 11:00), Max: 36.4 (28 Apr 2019 06:03)  T(F): 95.1 (28 Apr 2019 11:00), Max: 97.5 (28 Apr 2019 06:03)  HR: 60 (28 Apr 2019 12:00) (48 - 80)  BP: 112/65 (28 Apr 2019 11:00) (92/53 - 139/99)  BP(mean): 75 (28 Apr 2019 11:00) (64 - 123)  RR: 14 (28 Apr 2019 12:00) (8 - 29)  SpO2: 97% (28 Apr 2019 12:00) (93% - 100%)    PHYSICAL EXAM:  Constitutional:  Chronically ill-appearing  Eyes:  Sclerae anicteric, conjunctivae clear, PERRL  Ear/Nose/Throat:  No nasal exudate or sinus tenderness;  Unable to evaluate pharynx due to pt cooperation  Neck:  Supple, no adenopathy  Respiratory:  Clear bilaterally  Cardiovascular:  RRR, S1S2, no murmur appreciated  Gastrointestinal:  Symmetric, normoactive BS, soft, NT, no masses, guarding or rebound.  No HSM  Extremities:  No edema      LABS:                        8.3    7.69  )-----------( 265      ( 28 Apr 2019 05:19 )             27.7         04-28    136  |  107  |  51<H>  ----------------------------<  92  4.4   |  12<L>  |  2.55<H>    Ca    9.0      28 Apr 2019 05:19  Phos  3.9     04-28  Mg     2.0     04-28            MICROBIOLOGY:  Blood cultures:  4/24 X 2- NGTD  Urine culture 4/24:  >10^5 E coli S Gm, nitrofurantoin, TM, erta, earnest      RADIOLOGY & ADDITIONAL STUDIES:  No new studies

## 2019-04-28 NOTE — PROGRESS NOTE ADULT - ASSESSMENT
73F w PMHx CAD s/p stents (>10 years ago), HTN, HLD, anemia, seizure disorder (dx 01/2019) on Keppra, dementia with recent admission in 02/2019 for UTI who presents with ALE and electrolyte derangements in the setting of UTI and hypotension refractory to fluids and necessitating pressors. Admit for mgmt of septic shock 2/2 ESBL E.coli UTI    NEURO  #Dementia: history of dementia, worsened since onset of seizures; as per daughter, mother not very aware of her surroundings at times and is occasionally nonverbal and lethargic (frequently sleeping) due to dementia and depression; as per daughter, current mental status is consistent with mother's norm  - c/w donepezil 10mg qhs  - c/w memantine 5mg qd    #Seizure: no seizures since first diagnosed in Jan  - C/w w/ keppra 500mg bid  - C/w lacosamide 150 mg BID (reduced by 75% given CrCl)    PULMONARY  Not active, O2%  on RA    CARDIAC  #Shock  RESOLVED  - Patient off levophed; c/w midodrine 10mg q8 hours  - C/w ertapenem treatment for UTI as per ID  - Continue holding home antihypertensives (lisinopril, lopressor), baclofen, xanax for now     #CAD: s/p stents  - C/w atorvastatin 10mg qhs  - Continue holding lopressor 25mg bid and lisinopril 5mg qd in the setting of shock  - Echo: LV wall motion normal, EF 55-60%, normal LA/RA, calcified aortic valve minimal AS, trace TR, no pHTN. Ecoli grew in urine culture, vanc discontinued.    ID  #Shock 2/2 ESBL E. coli UTI, noted to have a history of recurrent UTI and hx nephrolithiasis; recently hospitalized in Feb for UTI and lethargy (s/p CTX tx) and Jan for seizure and UTI (s/p cefpodoxime tx); s/p CTX 2g in ED. S/p zosyn 2.25 IV (4/24 - 4/26), this strain is resistant to zosyn  - C/w ertapenem 500 mg IV q24h for 14 day course of abx in the setting of complicated UTI (4/26 - 5/9), dosed renally  - F/u urine culture sensitivities for carbapenems  - F/u ID recs    RENAL  #Acute on chronic kidney failure stage IIIa: baseline CR 1.2, FeNa 0.7% suggesting prerenal; as per daughter, mother with decreased PO intake over the last several months with weight loss; very dry MM on PE; s/p 3L NS in the ED; patient also with apparent genitourinary infection likely contributing to renal dysfunction  - Continue to trend BUN/Cr  - Avoid nephrotoxins, ACE/Arb, IV contrast  - Renally dose medications  - Renal U/S showing bilateral renal cysts, medical renal disease, angiomyolipoma  - Strict I/Os    #Electrolyte derangements: patient w/ hyperkalemia, hyperphosphatemia, hyponatremia; likely primarily due to acute on chronic renal dysfunction  - Resolving, replete PRN  - Trend lytes q4h  - Consider renal consult if worsening electrolytes  - Tele monitoring    #Non-anion gap metabolic acidosis  Likely 2/2 RTA in the setting of renal failure. Kidney function improving  - Continue to monitor    HEME  #DVT r/o: LLE pain for 2-3days; LLE swelling noted on PE; as per pulm fellow, bedside u/s w/ noncompressible at all 5 stations of LLE (common fem, saphenous intake, superficial and deep femoral, popliteal)  - BLE dopplers + for extensive DVTs b/l LE  - C/w heparin gtt, goal ptt 60-80    #Normocytic anemia: hgb within patient's baseline; prior iron studies (Feb 2019) suggesting anemia of chronic disease  - Continue to monitor     PSYCH  #Depression: diagnosed w/ depression; as per daughter, pt w/ decreased PO x 2-3mos and bouts of sleepiness, lethargy, and minimal interaction  - C/w remeron 15mg qhs  - Restart lexapro 10 mg once CrCl improves  - F/u psych recs    F: None  E: replete lytes as needed  N: Regular  LINES; peripheral IV's  METZ: TOV  DISPO: MICU  CODE: DNR/DNI (MOLST in chart)

## 2019-04-28 NOTE — CONSULT NOTE ADULT - SUBJECTIVE AND OBJECTIVE BOX
NICKI GATES      Patient is a 74y old  Female who presents with a chief complaint of hypotension (28 Apr 2019 06:48)      HPI:  72 yo F w PMH CAD s/p stents (>10 years ago), HTN, HLD, anemia, seizure disorder (dx 01/2019; on Keppra), dementia, depression, recent admission in 02/2019 for UTI who presents from home after labs performed by home physician showed ALE (86/6.44, baseline Cr 1.23). Per patient's daughter, patient had bloodwork obtained for regular checkup on 4/23. PCP noted significantly elevated BUN/CR (compared to bloodwork from nursing home on 4/16) and sent patient to ED. Daughter/aide has noticed decreased urination for possibly 2-3days (uncertain duration as pt uses diapers). Also patient has been complaining of LLE pain x3days. Today, with complaints of abdomen and back pain. No reports of f/c/n/v/d/c, CP, ab pain, dyspnea, dysuria, hematuria, bloody stools, melena, sick contacts. Of note, daughter reports pt has had kidney problems since childhood (unspecified) and frequently gets kidney stones and UTIs. Pt's mental status decreased since January after being having a seizure, however no acute alterations. Also per daughter, mother has been in nursing home since January until one week ago. She is currently at home with 24hr aide. For the last 2-3 months, has had decreased PO intake and weight loss.     In ED, T: 97.9, P: 71, BP: 92/65, RR: 16, O2: 96% RA. Labs BUN/Cr: 86/6.44, K: 6.7, bicarb: 16, P: 5. ECG w/ peaking T waves. Fierro placed which drained only a few mL. U/a suggesting UTI. Patient was given ceftriaxone for UTI and insulin/d50/kayexalate for hyperkalemia. Patient's BP downtrended in the ED to sBP in 70's. Patient received 3L of NS with minimal improvement and ultimately started on levo. (24 Apr 2019 19:38)      Addl  Medical issues:       HEALTH ISSUES - PROBLEM Dx:  Palliative care by specialist: Palliative care by specialist  Advanced care planning/counseling discussion: Advanced care planning/counseling discussion  Functional quadriplegia: Functional quadriplegia  Seizure: Seizure  Deep vein thrombosis: Deep vein thrombosis  Acute kidney injury: Acute kidney injury  Sepsis: Sepsis  Encephalopathy: Encephalopathy  Neurocognitive disorder  Depressive disorder  Depressive disorder: Depressive disorder  Suspected deep vein thrombosis (DVT)            MEDICATIONS  (STANDING):  atorvastatin 10 milliGRAM(s) Oral at bedtime  chlorhexidine 2% Cloths 1 Application(s) Topical <User Schedule>  donepezil 10 milliGRAM(s) Oral at bedtime  ertapenem  IVPB 500 milliGRAM(s) IV Intermittent every 24 hours  escitalopram 10 milliGRAM(s) Oral daily  ferrous    sulfate 325 milliGRAM(s) Oral daily  heparin  Infusion. 700 Unit(s)/Hr (7 mL/Hr) IV Continuous <Continuous>  influenza   Vaccine 0.5 milliLiter(s) IntraMuscular once  lacosamide 150 milliGRAM(s) Oral two times a day  levETIRAcetam 500 milliGRAM(s) Oral two times a day  memantine 5 milliGRAM(s) Oral daily  midodrine 10 milliGRAM(s) Oral every 8 hours  mirtazapine 15 milliGRAM(s) Oral at bedtime  pantoprazole    Tablet 40 milliGRAM(s) Oral before breakfast  risperiDONE   Tablet 0.25 milliGRAM(s) Oral two times a day    MEDICATIONS  (PRN):          PAST MEDICAL & SURGICAL HISTORY:  Dementia  Seizure  Anemia  HLD (hyperlipidemia)  HTN (hypertension)  CAD (coronary artery disease)  S/P partial gastrectomy  H/O thyroidectomy      REVIEW OF SYSTEMS:  [x] As per HPI          Reviewed   no change                            Changes noted  CONSTITUTIONAL: No fever, weight loss, or fatigue  RESPIRATORY: No cough, wheezing, chills or hemoptysis; No Shortness of Breath  CARDIOVASCULAR: No chest pain, palpitations, dizziness, or leg swelling  GASTROINTESTINAL: No abdominal or epigastric pain. No nausea, vomiting, or hematemesis; No diarrhea or constipation. No melena or hematochezia.  MUSCULOSKELETAL: No joint pain or swelling; No muscle, back, or extremity pain  Neuro:   Grossly  Negative  Psych        Awake  alert dementia  [x] All others negative	  [ ] Unable to obtain      Vital Signs Last 24 Hrs  PHYSICAL EXAM:      Constitutional: NAD, well-groomed, well-developed  HEENT: PERRLA, EOMI, Normal Hearing, MMM  Neck: No LAD, No JVD  Back: Normal spine flexure, No CVA tenderness  Respiratory: CTABCardiovascular: S1 and S2, RRR, no M/G/R  Gastrointestinal: BS+, soft, NT/ND  Extremities: No peripheral edema  Vascular: 2+ peripheral pulses  Neurological: A/O x 3, no focal deficits  Psychiatric: Normal mood, normal affect  Musculoskeletal: 5/5 strength b/l upper and lower extremities  Skin: No rashes    T(C): 35.4 (28 Apr 2019 09:00), Max: 36.4 (28 Apr 2019 06:03)  T(F): 95.7 (28 Apr 2019 09:00), Max: 97.5 (28 Apr 2019 06:03)  HR: 50 (28 Apr 2019 11:00) (48 - 80)  BP: 128/68 (28 Apr 2019 10:00) (92/53 - 139/99)  BP(mean): 87 (28 Apr 2019 10:00) (64 - 123)  RR: 13 (28 Apr 2019 11:00) (8 - 29)  SpO2: 100% (28 Apr 2019 11:00) (93% - 100%)    PHYSICAL EXAM:        :                                  8.3    7.69  )-----------( 265      ( 28 Apr 2019 05:19 )             27.7     04-28    136  |  107  |  51<H>  ----------------------------<  92  4.4   |  12<L>  |  2.55<H>    Ca    9.0      28 Apr 2019 05:19  Phos  3.9     04-28  Mg     2.0     04-28          PTT - ( 28 Apr 2019 05:19 )  PTT:43.6 sec  CAPILLARY BLOOD GLUCOSE          I&O's Summary    27 Apr 2019 07:01  -  28 Apr 2019 07:00  --------------------------------------------------------  IN: 198.2 mL / OUT: 1003 mL / NET: -804.8 mL    28 Apr 2019 07:01  -  28 Apr 2019 11:16  --------------------------------------------------------  IN: 28 mL / OUT: 45 mL / NET: -17 mL            ASSESSMENT/PLAN/RECOMMENDATIONS

## 2019-04-28 NOTE — PROGRESS NOTE ADULT - SUBJECTIVE AND OBJECTIVE BOX
Overnight Events:    Subjective: Patient seen and examined at bedside.    [OBJECTIVE]:    Vital Signs:  T(F): , Max: 97.3 (04-28-19 @ 01:04)  HR:  (52 - 80)  BP:  (97/62 - 127/72)  BP(mean):  (73 - 116)  RR:  (8 - 29)  SpO2:  (93% - 100%)  Wt(kg): --  CVP(cm H2O): --      04-26 @ 07:01  -  04-27 @ 07:00  --------------------------------------------------------  IN: 527.3 mL / OUT: 1155 mL / NET: -627.7 mL    04-27 @ 07:01  -  04-28 @ 06:48  --------------------------------------------------------  IN: 198.2 mL / OUT: 878 mL / NET: -679.8 mL      CAPILLARY BLOOD GLUCOSE          Physcial Exam:  T(F): 97.3 (04-28-19 @ 01:04)  HR: 66 (04-28-19 @ 06:00)  BP: 110/68 (04-28-19 @ 06:00)  RR: 14 (04-28-19 @ 06:00)  SpO2: 100% (04-28-19 @ 06:00)  Wt(kg): --    General: AO x 3, NAD, Comfortable, Pleasant, Anxious, Agitated, Ill, Frail, Cachectic, Resp distress  HEENT: PERRL/ EOMI, no scleral icterus, no ptosis, MMM, no JVD, no thyromegaly  Respiratory: CTA b/l, no wheezes, rales or rhonchi  Cardiovascular: Regular, +S1 + S2  Abdomen: Soft, NTND, normoactive bowel sounds, no rebound, no guarding, no suprapubic tenderness  Extremities: No cyanosis, no clubbing, no edema, pulses equal, no calf tenderness  Skin: No rashes  Lymphatic: No cervical/supraclavicular LAD  Neurological: CN II-XII grossly intact, follows commands, moves all extremities    Medications:  MEDICATIONS  (STANDING):  atorvastatin 10 milliGRAM(s) Oral at bedtime  chlorhexidine 2% Cloths 1 Application(s) Topical <User Schedule>  donepezil 10 milliGRAM(s) Oral at bedtime  ertapenem  IVPB 500 milliGRAM(s) IV Intermittent every 24 hours  escitalopram 10 milliGRAM(s) Oral daily  ferrous    sulfate 325 milliGRAM(s) Oral daily  heparin  Infusion. 700 Unit(s)/Hr (7 mL/Hr) IV Continuous <Continuous>  influenza   Vaccine 0.5 milliLiter(s) IntraMuscular once  lacosamide 150 milliGRAM(s) Oral two times a day  levETIRAcetam 500 milliGRAM(s) Oral two times a day  memantine 5 milliGRAM(s) Oral daily  midodrine 10 milliGRAM(s) Oral every 8 hours  mirtazapine 15 milliGRAM(s) Oral at bedtime  pantoprazole    Tablet 40 milliGRAM(s) Oral before breakfast  risperiDONE   Tablet 0.25 milliGRAM(s) Oral two times a day    MEDICATIONS  (PRN):      Allergies:  Allergies    No Known Allergies    Intolerances        Labs:                        8.3    7.69  )-----------( 265      ( 28 Apr 2019 05:19 )             27.7     04-28    136  |  107  |  51<H>  ----------------------------<  92  4.4   |  12<L>  |  2.55<H>    Ca    9.0      28 Apr 2019 05:19  Phos  3.9     04-28  Mg     2.0     04-28      PTT - ( 28 Apr 2019 05:19 )  PTT:43.6 sec      Radiology and other tests: Overnight Events: PTT titration of heparin drip overnight. No other acute events.    Subjective: Patient seen and examined at bedside. Appearing confused/anxious this AM, speaking in Malian. Continued to speak in [place]ian, but responded no to if patient was in pain or discomfort.    [OBJECTIVE]:    Vital Signs:  T(F): , Max: 97.3 (04-28-19 @ 01:04)  HR:  (52 - 80)  BP:  (97/62 - 127/72)  BP(mean):  (73 - 116)  RR:  (8 - 29)  SpO2:  (93% - 100%)  Wt(kg): --  CVP(cm H2O): --      04-26 @ 07:01  -  04-27 @ 07:00  --------------------------------------------------------  IN: 527.3 mL / OUT: 1155 mL / NET: -627.7 mL    04-27 @ 07:01  -  04-28 @ 06:48  --------------------------------------------------------  IN: 198.2 mL / OUT: 878 mL / NET: -679.8 mL      CAPILLARY BLOOD GLUCOSE    Physical Exam:  T(F): 97.3 (04-28-19 @ 01:04)  HR: 66 (04-28-19 @ 06:00)  BP: 110/68 (04-28-19 @ 06:00)  RR: 14 (04-28-19 @ 06:00)  SpO2: 100% (04-28-19 @ 06:00)  Wt(kg): --    General: Elderly appearing female, comfortable, confused this AM  HEENT: PERRL/ EOMI, no scleral icterus, DMM  Respiratory: CTA b/l, no wheezes, rales or rhonchi  Cardiovascular: Bradycardic, s1/s2, no murmurs, rubs or gallops  Abdomen: Soft, NTND, normoactive bowel sounds x4  Extremities: No cyanosis, no clubbing, b/l LE edema, LLE > RLE, 1+ DP pulses b/l, 2+ radial pulses b/l  Lymphatic: No cervical/supraclavicular LAD  Neurological: AAOx1 to name only, not following commands this AM, speaking in [place]ian, but did answer No to if she was uncomfortable/in pain    Medications:  MEDICATIONS  (STANDING):  atorvastatin 10 milliGRAM(s) Oral at bedtime  chlorhexidine 2% Cloths 1 Application(s) Topical <User Schedule>  donepezil 10 milliGRAM(s) Oral at bedtime  ertapenem  IVPB 500 milliGRAM(s) IV Intermittent every 24 hours  escitalopram 10 milliGRAM(s) Oral daily  ferrous    sulfate 325 milliGRAM(s) Oral daily  heparin  Infusion. 700 Unit(s)/Hr (7 mL/Hr) IV Continuous <Continuous>  influenza   Vaccine 0.5 milliLiter(s) IntraMuscular once  lacosamide 150 milliGRAM(s) Oral two times a day  levETIRAcetam 500 milliGRAM(s) Oral two times a day  memantine 5 milliGRAM(s) Oral daily  midodrine 10 milliGRAM(s) Oral every 8 hours  mirtazapine 15 milliGRAM(s) Oral at bedtime  pantoprazole    Tablet 40 milliGRAM(s) Oral before breakfast  risperiDONE   Tablet 0.25 milliGRAM(s) Oral two times a day    MEDICATIONS  (PRN):      Allergies:  Allergies    No Known Allergies    Intolerances        Labs:                        8.3    7.69  )-----------( 265      ( 28 Apr 2019 05:19 )             27.7     04-28    136  |  107  |  51<H>  ----------------------------<  92  4.4   |  12<L>  |  2.55<H>    Ca    9.0      28 Apr 2019 05:19  Phos  3.9     04-28  Mg     2.0     04-28      PTT - ( 28 Apr 2019 05:19 )  PTT:43.6 sec      Radiology and other tests:

## 2019-04-28 NOTE — PROGRESS NOTE ADULT - ASSESSMENT
Suggest:  - f/u blood culture pending from this morning, cultures pending from 4/24 72 yo F with HTN, CAD, seizure d/o, dementia, depression admitted with urosepsis due to ESBL-producing E coli.  She was markedly better when I saw her yesterday, now hypothermic, reason unclear.  Her vascular access is peripheral, she has no pulmonary or abd findings, her UTI is being treated.  Suggest:  - f/u blood culture pending from this morning, cultures pending from 4/24  - Continue ertapenem 500 mg Iv q24h   - Would obtain CXR  - If again requiring pressors, would broaden coverage empirically to vancomycin and meropenem  Will follow with you - ID Team 1.

## 2019-04-28 NOTE — CONSULT NOTE ADULT - ATTENDING COMMENTS
Sepsis  CAD  Hypotension  rx with IVF/Levo  Dementia-stable  ALE--fluids  cr down 2.55
I have reviewed the medical record, including laboratory and radiographic studies, interviewed and examined the patient and discussed the plan with Dr. Bran, the ID Resident.  Agree with above. Will follow with you – ID Team 1.

## 2019-04-29 DIAGNOSIS — E78.5 HYPERLIPIDEMIA, UNSPECIFIED: ICD-10-CM

## 2019-04-29 DIAGNOSIS — R63.8 OTHER SYMPTOMS AND SIGNS CONCERNING FOOD AND FLUID INTAKE: ICD-10-CM

## 2019-04-29 DIAGNOSIS — I10 ESSENTIAL (PRIMARY) HYPERTENSION: ICD-10-CM

## 2019-04-29 DIAGNOSIS — N39.0 URINARY TRACT INFECTION, SITE NOT SPECIFIED: ICD-10-CM

## 2019-04-29 DIAGNOSIS — Z91.89 OTHER SPECIFIED PERSONAL RISK FACTORS, NOT ELSEWHERE CLASSIFIED: ICD-10-CM

## 2019-04-29 DIAGNOSIS — F03.90 UNSPECIFIED DEMENTIA WITHOUT BEHAVIORAL DISTURBANCE: ICD-10-CM

## 2019-04-29 DIAGNOSIS — F32.9 MAJOR DEPRESSIVE DISORDER, SINGLE EPISODE, UNSPECIFIED: ICD-10-CM

## 2019-04-29 LAB
ANION GAP SERPL CALC-SCNC: 15 MMOL/L — SIGNIFICANT CHANGE UP (ref 5–17)
APTT BLD: 50.5 SEC — HIGH (ref 27.5–36.3)
BUN SERPL-MCNC: 47 MG/DL — HIGH (ref 7–23)
CALCIUM SERPL-MCNC: 9.1 MG/DL — SIGNIFICANT CHANGE UP (ref 8.4–10.5)
CHLORIDE SERPL-SCNC: 110 MMOL/L — HIGH (ref 96–108)
CO2 SERPL-SCNC: 15 MMOL/L — LOW (ref 22–31)
CREAT SERPL-MCNC: 2.17 MG/DL — HIGH (ref 0.5–1.3)
CULTURE RESULTS: SIGNIFICANT CHANGE UP
CULTURE RESULTS: SIGNIFICANT CHANGE UP
GLUCOSE SERPL-MCNC: 107 MG/DL — HIGH (ref 70–99)
HCT VFR BLD CALC: 23.2 % — LOW (ref 34.5–45)
HGB BLD-MCNC: 7.2 G/DL — LOW (ref 11.5–15.5)
MAGNESIUM SERPL-MCNC: 1.9 MG/DL — SIGNIFICANT CHANGE UP (ref 1.6–2.6)
MCHC RBC-ENTMCNC: 28.2 PG — SIGNIFICANT CHANGE UP (ref 27–34)
MCHC RBC-ENTMCNC: 31 GM/DL — LOW (ref 32–36)
MCV RBC AUTO: 91 FL — SIGNIFICANT CHANGE UP (ref 80–100)
NRBC # BLD: 0 /100 WBCS — SIGNIFICANT CHANGE UP (ref 0–0)
PHOSPHATE SERPL-MCNC: 3.5 MG/DL — SIGNIFICANT CHANGE UP (ref 2.5–4.5)
PLATELET # BLD AUTO: 310 K/UL — SIGNIFICANT CHANGE UP (ref 150–400)
POTASSIUM SERPL-MCNC: 4.4 MMOL/L — SIGNIFICANT CHANGE UP (ref 3.5–5.3)
POTASSIUM SERPL-SCNC: 4.4 MMOL/L — SIGNIFICANT CHANGE UP (ref 3.5–5.3)
RBC # BLD: 2.55 M/UL — LOW (ref 3.8–5.2)
RBC # FLD: 18.1 % — HIGH (ref 10.3–14.5)
SODIUM SERPL-SCNC: 140 MMOL/L — SIGNIFICANT CHANGE UP (ref 135–145)
SPECIMEN SOURCE: SIGNIFICANT CHANGE UP
SPECIMEN SOURCE: SIGNIFICANT CHANGE UP
WBC # BLD: 6.96 K/UL — SIGNIFICANT CHANGE UP (ref 3.8–10.5)
WBC # FLD AUTO: 6.96 K/UL — SIGNIFICANT CHANGE UP (ref 3.8–10.5)

## 2019-04-29 PROCEDURE — 99231 SBSQ HOSP IP/OBS SF/LOW 25: CPT

## 2019-04-29 PROCEDURE — 99233 SBSQ HOSP IP/OBS HIGH 50: CPT

## 2019-04-29 PROCEDURE — 99232 SBSQ HOSP IP/OBS MODERATE 35: CPT | Mod: GC

## 2019-04-29 RX ORDER — APIXABAN 2.5 MG/1
5 TABLET, FILM COATED ORAL EVERY 12 HOURS
Qty: 0 | Refills: 0 | Status: CANCELLED | OUTPATIENT
Start: 2019-05-06 | End: 2019-05-01

## 2019-04-29 RX ORDER — HEPARIN SODIUM 5000 [USP'U]/ML
5.5 INJECTION INTRAVENOUS; SUBCUTANEOUS
Qty: 25000 | Refills: 0 | Status: DISCONTINUED | OUTPATIENT
Start: 2019-04-29 | End: 2019-04-29

## 2019-04-29 RX ORDER — APIXABAN 2.5 MG/1
10 TABLET, FILM COATED ORAL EVERY 12 HOURS
Qty: 0 | Refills: 0 | Status: DISCONTINUED | OUTPATIENT
Start: 2019-04-29 | End: 2019-05-01

## 2019-04-29 RX ORDER — HEPARIN SODIUM 5000 [USP'U]/ML
550 INJECTION INTRAVENOUS; SUBCUTANEOUS
Qty: 25000 | Refills: 0 | Status: DISCONTINUED | OUTPATIENT
Start: 2019-04-29 | End: 2019-04-29

## 2019-04-29 RX ORDER — SODIUM CHLORIDE 9 MG/ML
1000 INJECTION, SOLUTION INTRAVENOUS
Qty: 0 | Refills: 0 | Status: DISCONTINUED | OUTPATIENT
Start: 2019-04-29 | End: 2019-04-30

## 2019-04-29 RX ADMIN — SODIUM CHLORIDE 75 MILLILITER(S): 9 INJECTION, SOLUTION INTRAVENOUS at 10:55

## 2019-04-29 RX ADMIN — DONEPEZIL HYDROCHLORIDE 10 MILLIGRAM(S): 10 TABLET, FILM COATED ORAL at 22:54

## 2019-04-29 RX ADMIN — ATORVASTATIN CALCIUM 10 MILLIGRAM(S): 80 TABLET, FILM COATED ORAL at 22:54

## 2019-04-29 RX ADMIN — MEMANTINE HYDROCHLORIDE 5 MILLIGRAM(S): 10 TABLET ORAL at 11:47

## 2019-04-29 RX ADMIN — RISPERIDONE 0.25 MILLIGRAM(S): 4 TABLET ORAL at 22:54

## 2019-04-29 RX ADMIN — MIDODRINE HYDROCHLORIDE 10 MILLIGRAM(S): 2.5 TABLET ORAL at 03:09

## 2019-04-29 RX ADMIN — RISPERIDONE 0.25 MILLIGRAM(S): 4 TABLET ORAL at 07:05

## 2019-04-29 RX ADMIN — ESCITALOPRAM OXALATE 10 MILLIGRAM(S): 10 TABLET, FILM COATED ORAL at 11:47

## 2019-04-29 RX ADMIN — LACOSAMIDE 130 MILLIGRAM(S): 50 TABLET ORAL at 07:06

## 2019-04-29 RX ADMIN — APIXABAN 10 MILLIGRAM(S): 2.5 TABLET, FILM COATED ORAL at 11:49

## 2019-04-29 RX ADMIN — SODIUM CHLORIDE 75 MILLILITER(S): 9 INJECTION INTRAMUSCULAR; INTRAVENOUS; SUBCUTANEOUS at 03:09

## 2019-04-29 RX ADMIN — LEVETIRACETAM 400 MILLIGRAM(S): 250 TABLET, FILM COATED ORAL at 18:58

## 2019-04-29 RX ADMIN — LEVETIRACETAM 400 MILLIGRAM(S): 250 TABLET, FILM COATED ORAL at 07:05

## 2019-04-29 RX ADMIN — Medication 325 MILLIGRAM(S): at 11:47

## 2019-04-29 RX ADMIN — MIDODRINE HYDROCHLORIDE 10 MILLIGRAM(S): 2.5 TABLET ORAL at 11:46

## 2019-04-29 RX ADMIN — PANTOPRAZOLE SODIUM 40 MILLIGRAM(S): 20 TABLET, DELAYED RELEASE ORAL at 11:17

## 2019-04-29 RX ADMIN — SODIUM CHLORIDE 75 MILLILITER(S): 9 INJECTION, SOLUTION INTRAVENOUS at 18:12

## 2019-04-29 RX ADMIN — MIRTAZAPINE 15 MILLIGRAM(S): 45 TABLET, ORALLY DISINTEGRATING ORAL at 22:54

## 2019-04-29 RX ADMIN — CHLORHEXIDINE GLUCONATE 1 APPLICATION(S): 213 SOLUTION TOPICAL at 07:06

## 2019-04-29 NOTE — PROGRESS NOTE ADULT - SUBJECTIVE AND OBJECTIVE BOX
73F w PMH CAD s/p stents (>10 years ago), HTN, HLD, anemia, seizure disorder (dx 01/2019; on Keppra), dementia, depression, recent admission in 02/2019 for UTI, presented from home after labs by home physician showed ALE (baseline Cr 1.23 -> 6.44). Presenting with decreased urination, LLE pain, patient has been genreally bedbound since leaving nursing home a week prior to admission. In ED, T: 97.9, P: 71, BP: 92/65, RR: 16, O2: 96% RA. Labs BUN/Cr: 86/6.44, K: 6.7, bicarb: 16, P: 5. ECG w/ peaking T waves. Fierro placed, given ceftriaxone for UTI positive on U/A, and insulin/d50/kayexalate for hyperK. MOLST filled, patient DNR/DNI but still wishing to have abx, IVF, pressors, and medical management. Started on vanc/zosyn. Dopplers b/l LE positive for extensive b/l DVTs. Echo: LV wall motion normal, EF 55-60%, normal LA/RA, calcified aortic valve minimal AS, trace TR, no pHTN. Renal U/S showing bilateral renal cysts, medical renal disease, angiomyolipoma. Vanc discontinued as urine culture positive for ESBL E.coli, s/p 1 dose of meropenem, now on ertapenem (last dose 5/9). Midodrine 10 mg q8h started on 4/26, patient has not been requiring levophed for 2 days. Intermittently refuses medications and expresses suicidal ideations, psych started risperidone. Switched lacosamide and keppra to IV due to intermittent refusal. Off heparin today, switched to loading dose of eliquis for DVTs. Stable for continued management on regional medical floors for complicated UTI.    SUBJECTIVE  continues to have dysuria but no suprapubic pain. has nausea. no headache, trouble breathing, vomiting, chest pain.    VITAL SIGNS:  Vital Signs Last 24 Hrs  T(C): 36.1 (29 Apr 2019 09:22), Max: 36.7 (28 Apr 2019 19:32)  T(F): 97 (29 Apr 2019 09:22), Max: 98.1 (28 Apr 2019 19:32)  HR: 58 (29 Apr 2019 11:00) (58 - 100)  BP: 103/59 (29 Apr 2019 11:00) (71/50 - 133/68)  BP(mean): 70 (29 Apr 2019 11:00) (57 - 90)  RR: 12 (29 Apr 2019 11:00) (12 - 20)  SpO2: 98% (29 Apr 2019 11:00) (95% - 100%)    PHYSICAL EXAM:    General: thin elderly female in NAD; lying in bed  HEENT: healing bruise noted on R forehead; MMM  Neck: supple  Cardiovascular: +S1/S2, RRR  Respiratory: CTA B/L; no W/R/R  Gastrointestinal: soft, NT/ND; +BSx4  Extremities: WWP; no edema, clubbing or cyanosis  Vascular: 2+ radial, DP/PT pulses B/L  Neurological: AAOx1 to self; depressed affect    MEDICATIONS:  MEDICATIONS  (STANDING):  apixaban 10 milliGRAM(s) Oral every 12 hours  atorvastatin 10 milliGRAM(s) Oral at bedtime  chlorhexidine 2% Cloths 1 Application(s) Topical <User Schedule>  donepezil 10 milliGRAM(s) Oral at bedtime  ertapenem  IVPB 500 milliGRAM(s) IV Intermittent every 24 hours  escitalopram 10 milliGRAM(s) Oral daily  ferrous    sulfate 325 milliGRAM(s) Oral daily  influenza   Vaccine 0.5 milliLiter(s) IntraMuscular once  lacosamide IVPB 150 milliGRAM(s) IV Intermittent every 12 hours  lactated ringers. 1000 milliLiter(s) (75 mL/Hr) IV Continuous <Continuous>  levETIRAcetam  IVPB 500 milliGRAM(s) IV Intermittent every 12 hours  memantine 5 milliGRAM(s) Oral daily  midodrine 10 milliGRAM(s) Oral every 8 hours  mirtazapine 15 milliGRAM(s) Oral at bedtime  pantoprazole  Injectable 40 milliGRAM(s) IV Push daily  risperiDONE   Tablet 0.25 milliGRAM(s) Oral two times a day    MEDICATIONS  (PRN):      ALLERGIES:  Allergies            LABS:                        7.2    6.96  )-----------( 310      ( 29 Apr 2019 05:14 )             23.2     04-29    140  |  110<H>  |  47<H>  ----------------------------<  107<H>  4.4   |  15<L>  |  2.17<H>    Ca    9.1      29 Apr 2019 05:14  Phos  3.5     04-29  Mg     1.9     04-29      PTT - ( 29 Apr 2019 01:46 )  PTT:50.5 sec    CAPILLARY BLOOD GLUCOSE          RADIOLOGY & ADDITIONAL TESTS: Reviewed.

## 2019-04-29 NOTE — PROGRESS NOTE ADULT - ASSESSMENT
72 yo F with HTN, CAD, seizure d/o, dementia, depression admitted with urosepsis due to ESBL-producing E coli.  Reason for yesterday's hypothermia is unclear but has now resolved.  Suggest:  - f/u blood cultures pending from 4/24 and 4/28  - Continue ertapenem 500 mg Iv q24h - would complete 14 d course (4/26-5/9)  - If she is d/mrecy, should have weekly CBC and CMP  - Please call me directly if outpatient f/u is desired.  Recommendations discussed with primary team.  Please recall if further ID input is desired - ID Team 1.

## 2019-04-29 NOTE — PROGRESS NOTE ADULT - ASSESSMENT
73F w PMHx CAD s/p stents (>10 years ago), HTN, HLD, anemia, seizure disorder (dx 01/2019) on Keppra, dementia with recent admission in 02/2019 for UTI who presents with ALE and electrolyte derangements in the setting of UTI and hypotension refractory to fluids and necessitating pressors. Admit for mgmt of septic shock 2/2 ESBL E.coli UTI    NEURO  #Dementia: history of dementia, worsened since onset of seizures; as per daughter, mother not very aware of her surroundings at times and is occasionally nonverbal and lethargic (frequently sleeping) due to dementia and depression; as per daughter, current mental status is consistent with mother's norm  - c/w donepezil 10mg qhs  - c/w memantine 5mg qd    #Seizure: no seizures since first diagnosed in Jan  - C/w w/ keppra 500mg bid  - C/w lacosamide 150 mg BID (reduced by 75% given CrCl)    PULMONARY  Not active, O2%  on RA    CARDIAC  #Shock  RESOLVED  - Patient off levophed; c/w midodrine 10mg q8 hours  - C/w treatment for UTI  - Continue holding home antihypertensives (lisinopril, lopressor), baclofen, xanax for now     #CAD: s/p stents  - C/w atorvastatin 10mg qhs  - Continue holding lopressor 25mg bid and lisinopril 5mg qd in the setting of shock  - Echo: LV wall motion normal, EF 55-60%, normal LA/RA, calcified aortic valve minimal AS, trace TR, no pHTN. Ecoli grew in urine culture, vanc discontinued.    ID  #Shock 2/2 ESBL E. coli UTI, noted to have a history of recurrent UTI and hx nephrolithiasis; recently hospitalized in Feb for UTI and lethargy (s/p CTX tx) and Jan for seizure and UTI (s/p cefpodoxime tx); s/p CTX 2g in ED. S/p zosyn 2.25 IV (4/24 - 4/26), this strain is resistant to zosyn  - C/w ertapenem 500 mg IV q24h for 14 day course of abx in the setting of complicated UTI (4/26 - 5/9), dosed renally  - F/u urine culture sensitivities for carbapenems  - F/u ID recs    RENAL  #Acute on chronic kidney failure stage IIIa: baseline CR 1.2, FeNa 0.7% suggesting prerenal; as per daughter, mother with decreased PO intake over the last several months with weight loss; very dry MM on PE; s/p 3L NS in the ED; patient also with apparent genitourinary infection likely contributing to renal dysfunction  - Continue to trend BUN/Cr  - Avoid nephrotoxins, ACE/Arb, IV contrast  - Renally dose medications  - Renal U/S showing bilateral renal cysts, medical renal disease, angiomyolipoma  - Strict I/Os    #Electrolyte derangements: patient w/ hyperkalemia, hyperphosphatemia, hyponatremia; likely primarily due to acute on chronic renal dysfunction  - Resolving, replete PRN  - Trend lytes q4h  - Consider renal consult if worsening electrolytes  - Tele monitoring    #Non-anion gap metabolic acidosis  Likely 2/2 RTA in the setting of renal failure. Bicarbonate today 15. Kidney function improving  - Continue to monitor    HEME  #DVT r/o: LLE pain for 2-3days; LLE swelling noted on PE; as per pulm fellow, bedside u/s w/ noncompressible at all 5 stations of LLE (common fem, saphenous intake, superficial and deep femoral, popliteal)  - BLE dopplers + for extensive DVTs b/l LE  - C/w heparin gtt, goal ptt 60-80    #Normocytic anemia: hgb within patient's baseline; prior iron studies (Feb 2019) suggesting anemia of chronic disease  - Continue to monitor     PSYCH  #Depression: diagnosed w/ depression; as per daughter, pt w/ decreased PO x 2-3mos and bouts of sleepiness, lethargy, and minimal interaction  - C/w remeron 15mg qhs  - Restart lexapro 10 mg once CrCl improves  - F/u psych recs    F: None  E: replete lytes as needed  N: Regular  LINES; peripheral IV's  METZ: TOV  DISPO: MICU  CODE: DNR/DNI (MOLST in chart) 73F w PMHx CAD s/p stents (>10 years ago), HTN, HLD, anemia, seizure disorder (dx 01/2019) on Keppra, dementia with recent admission in 02/2019 for UTI who presents with ALE and electrolyte derangements in the setting of UTI and hypotension refractory to fluids and necessitating pressors. Admit for mgmt of septic shock 2/2 ESBL E.coli UTI    NEURO  #Dementia: history of dementia, worsened since onset of seizures; as per daughter, mother not very aware of her surroundings at times and is occasionally nonverbal and lethargic (frequently sleeping) due to dementia and depression; as per daughter, current mental status is consistent with mother's norm  - c/w donepezil 10mg qhs  - c/w memantine 5mg qd    #Seizure: no seizures since first diagnosed in Jan  - C/w w/ keppra 500mg bid  - C/w lacosamide 150 mg BID (reduced by 75% given CrCl)    PULMONARY  Not active, O2%  on RA    CARDIAC  #Hypotension 2/2 septic shock  RESOLVED  - Patient off levophed; c/w midodrine 10mg q8 hours  - C/w treatment for UTI  - Continue holding home antihypertensives (lisinopril, lopressor), baclofen, xanax for now     #CAD: s/p stents  - C/w atorvastatin 10mg qhs  - Continue holding lopressor 25mg bid and lisinopril 5mg qd in the setting of shock  - Echo: LV wall motion normal, EF 55-60%, normal LA/RA, calcified aortic valve minimal AS, trace TR, no pHTN. Ecoli grew in urine culture, vanc discontinued.    ID  #Septic shock 2/2 ESBL E. coli complicated UTI (2/4 SIRS criteria; evening of admission hypothermic, elevated WBC, source UTI, requiring pressors). Patient has a known history of recurrent UTI and hx nephrolithiasis; recently hospitalized in Feb for UTI and lethargy (s/p CTX tx) and Jan for seizure and UTI (s/p cefpodoxime tx); s/p CTX 2g in ED. S/p zosyn 2.25 IV (4/24 - 4/26), this strain is resistant to zosyn  - C/w ertapenem 500 mg IV q24h for 14 day course of abx in the setting of complicated UTI (4/26 - 5/9), dosed renally  - F/u urine culture sensitivities for carbapenems  - F/u ID recs  - will need PICC line prior to discharge    RENAL  #Acute on chronic kidney failure stage IIIa: baseline CR 1.2, FeNa 0.7% suggesting prerenal; as per daughter, mother with decreased PO intake over the last several months with weight loss; very dry MM on PE; s/p 3L NS in the ED; patient also with apparent genitourinary infection likely contributing to renal dysfunction  - Continue to trend BUN/Cr  - Avoid nephrotoxins, ACE/Arb, IV contrast  - Renally dose medications  - Renal U/S showing bilateral renal cysts, medical renal disease, angiomyolipoma  - Strict I/Os    #Electrolyte derangements: patient w/ hyperkalemia, hyperphosphatemia, hyponatremia; likely primarily due to acute on chronic renal dysfunction  - Resolving, replete PRN  - Trend lytes q4h  - Consider renal consult if worsening electrolytes  - Tele monitoring    #Non-anion gap metabolic acidosis  Likely 2/2 RTA in the setting of acute on chronic kidney failure. Bicarbonate today 15. Kidney function improving  - Continue to monitor    HEME  #DVT r/o: LLE pain for 2-3days; LLE swelling noted on PE; as per pulm fellow, bedside u/s w/ noncompressible at all 5 stations of LLE (common fem, saphenous intake, superficial and deep femoral, popliteal)  - BLE dopplers + for extensive DVTs b/l LE  - Switched heparin to eliquis today    #Normocytic anemia: hgb within patient's baseline; prior iron studies (Feb 2019) suggesting anemia of chronic disease  - Continue to monitor     PSYCH  #Depression: diagnosed w/ depression; as per daughter, pt w/ decreased PO x 2-3mos and bouts of sleepiness, lethargy, and minimal interaction  - C/w remeron 15mg qhs  - C/w lexapro 10 mg  - F/u psych recs    F: None  E: replete lytes as needed  N: Regular  LINES; peripheral IV's  METZ: TOV  DISPO: MICU  CODE: DNR/DNI (MOLST in chart) 73F w PMHx CAD s/p stents (>10 years ago), HTN, HLD, anemia, seizure disorder (dx 01/2019) on Keppra, dementia with recent admission in 02/2019 for UTI who presents with ALE and electrolyte derangements in the setting of UTI and hypotension refractory to fluids and necessitating pressors. Admit for mgmt of septic shock 2/2 ESBL E.coli UTI    NEURO  #Dementia: history of dementia, worsened since onset of seizures; as per daughter, mother not very aware of her surroundings at times and is occasionally nonverbal and lethargic (frequently sleeping) due to dementia and depression; as per daughter, current mental status is consistent with mother's norm  - c/w donepezil 10mg qhs  - c/w memantine 5mg qd    #Seizure: no seizures since first diagnosed in Jan  - C/w w/ keppra 500mg bid  - C/w lacosamide 150 mg BID (reduced by 75% given CrCl)    PULMONARY  Not active, O2%  on RA    CARDIAC  #Hypotension 2/2 septic shock  RESOLVED  - Patient off levophed; c/w midodrine 10mg q8 hours  - C/w treatment for UTI  - Continue holding home antihypertensives (lisinopril, lopressor), baclofen, xanax for now     #CAD, s/p stents  - C/w atorvastatin 10mg qhs  - Continue holding lopressor 25mg bid and lisinopril 5mg qd in the setting of shock  - Echo: LV wall motion normal, EF 55-60%, normal LA/RA, calcified aortic valve minimal AS, trace TR, no pHTN.    ID  #Septic shock 2/2 ESBL E. coli complicated UTI (2/4 SIRS criteria; evening of admission hypothermic, elevated WBC, source UTI, requiring pressors). Patient has a known history of recurrent UTI and hx nephrolithiasis; recently hospitalized in Feb for UTI and lethargy (s/p CTX tx) and Jan for seizure and UTI (s/p cefpodoxime tx); s/p CTX 2g in ED. S/p zosyn 2.25 IV (4/24 - 4/26), this strain is resistant to zosyn  - C/w ertapenem 500 mg IV q24h for 14 day course of abx in the setting of complicated UTI (4/26 - 5/9), dosed renally  - F/u urine culture sensitivities for carbapenems  - F/u ID recs  - will need PICC line prior to discharge    RENAL  #Acute on chronic kidney failure stage IIIa: baseline CR 1.2, FeNa 0.7% suggesting prerenal; as per daughter, mother with decreased PO intake over the last several months with weight loss; very dry MM on PE; s/p 3L NS in the ED; patient also with apparent genitourinary infection likely contributing to renal dysfunction  - Continue to trend BUN/Cr  - Avoid nephrotoxins, ACE/Arb, IV contrast  - Renally dose medications  - Renal U/S showing bilateral renal cysts, medical renal disease, angiomyolipoma  - Strict I/Os    #Electrolyte derangements: patient w/ hyperkalemia, hyperphosphatemia, hyponatremia; likely primarily due to acute on chronic renal dysfunction  - Resolving, replete PRN  - Trend lytes q4h  - Consider renal consult if worsening electrolytes  - Tele monitoring    #Non-anion gap metabolic acidosis  Likely 2/2 RTA in the setting of acute on chronic kidney failure. Bicarbonate today 15. Kidney function improving  - Continue to monitor      #urinary retention  - gomze replaced for urinary retention  - monitor UOP  - TOV    HEME  #DVT r/o: LLE pain for 2-3days; LLE swelling noted on PE; as per pulm fellow, bedside u/s w/ noncompressible at all 5 stations of LLE (common fem, saphenous intake, superficial and deep femoral, popliteal)  - BLE dopplers + for extensive DVTs b/l LE  - Switched heparin to eliquis today    #Normocytic anemia: hgb within patient's baseline; prior iron studies (Feb 2019) suggesting anemia of chronic disease  - Continue to monitor     PSYCH  #Depression: diagnosed w/ depression; as per daughter, pt w/ decreased PO x 2-3mos and bouts of sleepiness, lethargy, and minimal interaction  - C/w remeron 15mg qhs  - C/w lexapro 10 mg  - F/u psych recs    F: None  E: replete lytes as needed  N: Regular  LINES; peripheral IV's  GOMEZ: Replaced for urinary retention  DISPO: RMF  CODE: DNR/DNI (MOLST in chart)

## 2019-04-29 NOTE — PROGRESS NOTE ADULT - ASSESSMENT
A 73 year old female with PMH CAD s/p stents (>10 years ago), HTN, HLD, anemia, seizure disorder (dx 01/2019; on Keppra), dementia, depression, recent admission in 02/2019 for UTI admitted to the MICU For further management of sepsis 2/2 urinary tract infection and acute kidney injury. Based on urine culture sensitivities was switched to Merrem, remains on minimal pressors (Levophed 0.06mcg). Per primary care team discussion with daughter- patient is DNR/DNI. Palliative consulted to further assist with GOC and symptom management

## 2019-04-29 NOTE — PROGRESS NOTE ADULT - SUBJECTIVE AND OBJECTIVE BOX
INTERVAL HPI/OVERNIGHT EVENTS:  Normothermic    CONSTITUTIONAL:  No fever, chills, night sweats  EYES:  No photophobia or visual changes  CARDIOVASCULAR:  No chest pain  RESPIRATORY:  No cough, wheezing, or SOB   GASTROINTESTINAL:  No nausea, vomiting, diarrhea, constipation, or abdominal pain  GENITOURINARY:  No frequency, urgency, dysuria or hematuria  NEUROLOGIC:  No headache, lightheadedness      ANTIBIOTICS/RELEVANT:    Ertapenem 05. g IV q24h (4/26-present)    Meropenem 4/26  vancomycin 4/24  pip=-tazo 4/24-4/26        Vital Signs Last 24 Hrs  T(C): 35.3 (29 Apr 2019 14:34), Max: 36.7 (28 Apr 2019 19:32)  T(F): 95.5 (29 Apr 2019 14:34), Max: 98.1 (28 Apr 2019 19:32)  HR: 84 (29 Apr 2019 16:00) (58 - 100)  BP: 117/70 (29 Apr 2019 16:00) (82/51 - 148/83)  BP(mean): 81 (29 Apr 2019 16:00) (57 - 98)  RR: 12 (29 Apr 2019 16:00) (12 - 20)  SpO2: 95% (29 Apr 2019 16:00) (95% - 100%)    PHYSICAL EXAM:  Constitutional:  Well-developed, well nourished  Eyes:  Sclerae anicterica, conjunctivae clear, PERRL  Ear/Nose/Throat:  No nasal exudate or sinus tenderness;  No buccal mucosal lesions, no pharyngeal erythema or exudate	  Neck:  Supple, no adenopathy  Respiratory:  Clear bilaterally  Cardiovascular:  RRR, S1S2, no murmur appreciated  Gastrointestinal:  Symmetric, normoactive BS, soft, NT, no masses, guarding or rebound.  No HSM  Extremities:  No edema      LABS:                        7.2    6.96  )-----------( 310      ( 29 Apr 2019 05:14 )             23.2         04-29    140  |  110<H>  |  47<H>  ----------------------------<  107<H>  4.4   |  15<L>  |  2.17<H>    Ca    9.1      29 Apr 2019 05:14  Phos  3.5     04-29  Mg     1.9     04-29            MICROBIOLOGY:    Blood cultures:  4/24 X 2- NGTD;  4/28 X 1 - NGTD  Urine culture 4/24:  >10^5 E coli S Gm, nitrofurantoin, TM, erta, earnest        RADIOLOGY & ADDITIONAL STUDIES:

## 2019-04-29 NOTE — CONSULT NOTE ADULT - SUBJECTIVE AND OBJECTIVE BOX
[facility] Consult Note    [facility]OB[facility]:  Transition of care performed with sharing of clinical summary: Transition of care performed with sharing of clinical summary  Nutrition, metabolism, and development symptoms: Nutrition, metabolism, and development symptoms  Dementia: Dementia  HTN (hypertension): HTN (hypertension)  HLD (hyperlipidemia): HLD (hyperlipidemia)  UTI (urinary tract infection): UTI (urinary tract infection)  Palliative care by specialist: Palliative care by specialist  Advanced care planning/counseling discussion: Advanced care planning/counseling discussion  Functional quadriplegia: Functional quadriplegia  Seizure: Seizure  Deep vein thrombosis: Deep vein thrombosis  Acute kidney injury: Acute kidney injury  Sepsis: Sepsis  Encephalopathy: Encephalopathy  Neurocognitive disorder  Depressive disorder  Depressive disorder: Depressive disorder  Suspected deep vein thrombosis (DVT)             Diagnosis: UTI    Indications for Vascular Access (Check all that apply)  [X  ]  Antibiotic Therapy       Antibiotic Prescribed:   ertapenem                                                                          Expected Duration of Therapy: 11 days              [  ]  IV Hydration  [  ]  Total Parenteral Nutrition  [  ]  Chemotherapy  [  ]  Difficult Venous Access  [  ]  CVP monitoring  [  ]  Medications with high potential for tissue necrosis on extravasation  [  ]  Other    Screening (Check all that apply)  Previous Radiation to chest  [  ] Yes      [ X ]  No  Breast Cancer                          [  ] Left     [  ]  Right    [ XX ]  No  Lymph Node Dissection         [  ] Left     [  ]  Right    [ X ]  No  Pacemaker or ICD                   [  ] Left     [  ]  Right    [  X]  No  Upper Extremity DVT             [  ] Left     [  ]  Right    [  X]  No  Chronic Kidney Disease         [  ]  Yes     [X  ]  No  Hemodialysis                           [  ]  Yes     [ X ]  No  AV Fistula/ Graft                     [  ]  Left    [  ]  Right    [ X ]  No  Temp>101F in past 24 H       [  ]  Yes     [X  ]  No  H/O PICC/Midline                   [  ]  Yes     [ X ]  No    Lab data:                        7.2    6.96  )-----------( 310      ( 29 Apr 2019 05:14 )             23.2     04-29    140  |  110<H>  |  47<H>  ----------------------------<  107<H>  4.4   |  15<L>  |  2.17<H>    Ca    9.1      29 Apr 2019 05:14  Phos  3.5     04-29  Mg     1.9     04-29      PTT - ( 29 Apr 2019 01:46 )  PTT:50.5 sec  Culture Results:   No growth at 1 day. (04-28 @ 11:58)          I have reviewed the chart, interviewed and examined the patient and determined that this patient:  [  ] Is a candidate for a PICC line  [ X ] Is a candidate for a Midline  [  ] Is not a candidate for vascular access device (reason)    Lumens:    [X  ] Single  [  ] Double

## 2019-04-29 NOTE — PROGRESS NOTE ADULT - ASSESSMENT
73F w PMHx CAD s/p stents (>10 years ago), HTN, HLD, anemia, seizure disorder (dx 01/2019) on Keppra, dementia with recent admission in 02/2019 for UTI who presents with ALE and electrolyte derangements in the setting of UTI and hypotension refractory to fluids and necessitating pressors. Admit for mgmt of septic shock 2/2 ESBL E.coli UTI. Course c/b extensive b/l DVT (transitioned from heparin gtt to eliquis 4/29) as well as suicidal ideation now on risperidone.

## 2019-04-29 NOTE — PROGRESS NOTE ADULT - SUBJECTIVE AND OBJECTIVE BOX
NICKI GATES          MRN-[id number]            (1945)    HPI:  A 73 year old female with PMH CAD s/p stents (>10 years ago), HTN, HLD, anemia, seizure disorder (dx 2019; on Keppra), dementia, depression, recent admission in 2019 for UTI who presents from home after labs performed by home physician showed ALE (86/6.44, baseline Cr 1.23). Per patient's daughter, patient had bloodwork obtained for regular checkup on . PCP noted significantly elevated BUN/CR (compared to bloodwork from nursing home on ) and sent patient to ED. Daughter/aide has noticed decreased urination for possibly 2-3days (uncertain duration as pt uses diapers). Also patient has been complaining of LLE pain x3days. Today, with complaints of abdomen and back pain. No reports of f/c/n/v/d/c, CP, ab pain, dyspnea, dysuria, hematuria, bloody stools, melena, sick contacts. Of note, daughter reports pt has had kidney problems since childhood (unspecified) and frequently gets kidney stones and UTIs. Pt's mental status decreased since January after being having a seizure, however no acute alterations. Also per daughter, mother has been in nursing home since January until one week ago. She is currently at home with 24hr aide. For the last 2-3 months, has had decreased PO intake and weight loss.     HOSPITAL COURSE:   In ED, T: 97.9, P: 71, BP: 92/65, RR: 16, O2: 96% RA. Labs BUN/Cr: 86/6.44, K: 6.7, bicarb: 16, P: 5. ECG w/ peaking T waves. Gomez placed which drained only a few mL. U/a suggesting UTI. Patient was given ceftriaxone for UTI and insulin/d50/kayexalate for hyperkalemia. Patient's BP downtrended in the ED to sBP in 70's. Patient received 3L of NS with minimal improvement and ultimately started on Levo. (2019 19:38). Patient was then admitted to the MICU For further management of sepsis 2/2 urinary tract infection and acute kidney injury. Based on urine culture sensitivities was switched to Merrem, no longer on minimal pressors (Levophed 0.06mcg). Per primary care team discussion with daughter- patient is DNR/DNI. Palliative consulted to further assist with GOC    SUBJECTIVE:   Patient is alert, awake, responsive to questioning, disoriented to person, time, or place.  Tells me that she feels "crazy in my mind."  Apparently over the weekend has said that she wants to die.  Seen by psychiatry on the weekend.  Medications adjusted  Lying in the bed, appears comfortable and in no apparent distress.    Communicative with few word phrases- able to vocalize complaints- reports discomfort in buttocks area- requested to be repositioned.  Denies any other complaints of pain, shortness of breath, nausea, vomiting.   Remains on IV antibiotics, , gomez catheter in place with clear urine +  Remains on contact isolation for ESBL+ in urine culture.  Daughter Lorrie Schmitt was no t present at the time of my initial visit             SOCIAL HISTORY: Patient is not , has one daughter Lorrie Schmitt, migrated from [facility] in . Notable decline in functional status since diagnosis of dementia 2 years ago, which further declined in 2019 after new onset seizures. Patient was in NH from January until April, was recently discharged home with 24H HHA by [place], [place]. Lives at home in elevator apartment building, is fully dependent in all activities of daily living and is primarily wheelchair bound. Patient reports not having an particular Islam association.     ROS:            Dyspnea (Sujatha 0-10):    0/10               N/V (Y/N):  No                       Secretions (Y/N) : No                 Agitation(Y/N):  No - appears calm   Pain (Y/N):    Denies any pain- reported slight discomfort in buttocks- requested RN to reposition patient.   -Provocation/Palliation:  -Quality/Quantity:  -Radiating:  -Severity:  -Timing/Frequency:  -Impact on ADLs:    General:  Denied  HEENT:    Denied  Neck:  Denied  CVS:  Denied  Resp:  Denied  GI:  Denied  :  Denied  Musc:  Denied  Neuro:  Denied  Psych:  Denied  Skin:  Denied  Lymph:  Denied    ALLERGIES:     No Known Allergies    Intolerances    OPIATE NAIVE (Y/N): Yes    -iStop reviewed (Y/N): Yes (Ref#: [id number])  Rx found for Alprazolam 0.25mg, Vimpat 200mg.     MEDICATIONS:      MEDICATIONS  (STANDING):  apixaban 10 milliGRAM(s) Oral every 12 hours  atorvastatin 10 milliGRAM(s) Oral at bedtime  chlorhexidine 2% Cloths 1 Application(s) Topical <User Schedule>  donepezil 10 milliGRAM(s) Oral at bedtime  ertapenem  IVPB 500 milliGRAM(s) IV Intermittent every 24 hours  escitalopram 10 milliGRAM(s) Oral daily  ferrous    sulfate 325 milliGRAM(s) Oral daily  influenza   Vaccine 0.5 milliLiter(s) IntraMuscular once  lacosamide IVPB 150 milliGRAM(s) IV Intermittent every 12 hours  lactated ringers. 1000 milliLiter(s) (75 mL/Hr) IV Continuous <Continuous>  levETIRAcetam  IVPB 500 milliGRAM(s) IV Intermittent every 12 hours  memantine 5 milliGRAM(s) Oral daily  midodrine 10 milliGRAM(s) Oral every 8 hours  mirtazapine 15 milliGRAM(s) Oral at bedtime  pantoprazole  Injectable 40 milliGRAM(s) IV Push daily  risperiDONE   Tablet 0.25 milliGRAM(s) Oral two times a day    MEDICATIONS  (PRN):      LABS:  Reviewed                         7.2    6.96  )-----------( 310      ( 2019 05:14 )             23.2   -    140  |  110<H>  |  47<H>  ----------------------------<  107<H>  4.4   |  15<L>  |  2.17<H>    Ca    9.1      2019 05:14  Phos  3.5       Mg     1.9               Urinalysis Basic - ( 2019 16:15 )    Color: Yellow / Appearance: Cloudy / S.025 / pH: x  Gluc: x / Ketone: Trace mg/dL  / Bili: Moderate / Urobili: 0.2 E.U./dL   Blood: x / Protein: 100 mg/dL / Nitrite: NEGATIVE   Leuk Esterase: Large / RBC: < 5 /HPF / WBC Many /HPF   Sq Epi: x / Non Sq Epi: 0-5 /HPF / Bacteria: Present /HPF    Culture - Blood (collected 2019 22:05)  Source: .Blood None  Preliminary Report (2019 23:00):    No growth at 1 day.    Culture - Blood (collected 2019 21:47)  Source: .Blood Blood  Preliminary Report (2019 22:00):    No growth at 1 day.    Culture - Urine (collected 2019 17:34)  Source: Catheterized Catheterized  Preliminary Report (2019 10:03):    >100,000 CFU/ml Escherichia coli ESBL    Additional  Susceptibility to follow.  Organism: Escherichia coli ESBL (2019 10:01)  Organism: Escherichia coli ESBL (2019 10:01)    IMAGING:  Reviewed  Xray Chest 1 View- PORTABLE-Urgent (19 @ 07:36)  EXAM:  XR CHEST PORTABLE URGENT 1V                        PROCEDURE DATE:  2019    INTERPRETATION:  Portable chest  History: Shortness of breath  Impression:  Minimal focal atelectasis left lung base. Lungs otherwise grossly clear.   No pneumothorax. Possible small left pleural effusion. No acute bone   abnormality. Limited rotated exam.    US Duplex Venous Lower Ext Complete, Bilateral (19 @ 15:59)   EXAM:  US DPLX LWR EXT VEINS COMPL BI                        PROCEDURE DATE:  2019    INTERPRETATION:    VENOUS DUPLEX DOPPLER OF BOTH LOWER EXTREMITIES dated 2019  INDICATION: Left lower extremity swelling an pain. Evaluate for DVT.  TECHNIQUE: Duplex Doppler evaluation including gray-scale ultrasound   imaging, color flow Doppler imaging, and Doppler spectral analysis of the   veins of both lower extremities was performed.   COMPARISON: None  FINDINGS:  RIGHT LOWER EXTREMITY:  There is noncompressibility and lack of phasic flow within the RIGHT   common femoral, proximal femoral and proximal greater saphenous vein   consistent with deep venous thrombosis. The mid to distal femoral,   popliteal and proximal deep femoral veins are patent.  The paired RIGHT peroneal and posterior tibial calf veins are not   adequately visualized due to soft tissue swelling and edema.  LEFT LOWER EXTREMITY:  There is noncompressibility and lack of phasic flow within the LEFT   common femoral, femoral, popliteal, proximal deep femoral and proximal   greater saphenous veins consistent with deep vein thrombosis.  The paired LEFT peroneal and posterior tibial calf veins are not   adequately visualized due to soft tissue swelling and edema.  IMPRESSION:  Extensive bilateral deep vein thromboses as above.    US Retroperitoneal B-Scan Limited (19 @ 16:34)   EXAM:  US RETROPERITONEAL LIMITED                        PROCEDURE DATE:  2019    INTERPRETATION:  RENAL ULTRASOUND dated 2019  Indication: Acute kidney injury.  Comparison: None  IMPRESSION:  1.  Diffusely increased renal echogenicity bilaterally compatible with   medical renal disease.  2.  Bilateral renal cysts, the largest measures 2.2 cm in the right renal   midportion and contains a single thin internal septation (Bosniak II).  3.  1.2 cm cortically-based hyperechoic lesion in left lower pole kidney,   possibly angiomyolipoma but may be confirmed by renal protocol MRI or CT.    PEx:  ICU Vital Signs Last 24 Hrs  T(C): 35.3 (2019 14:34), Max: 36.7 (2019 19:32)  T(F): 95.5 (2019 14:34), Max: 98.1 (2019 19:32)  HR: 84 (2019 16:00) (58 - 100)  BP: 117/70 (2019 16:00) (71/50 - 148/83)  BP(mean): 81 (2019 16:00) (57 - 98)  ABP: --  ABP(mean): --  RR: 12 (2019 16:00) (12 - 20)  SpO2: 95% (2019 16:00) (95% - 100%)        General: elderly, frail lady, alert/awake, disoriented to person, time, or place, but responsive/communicative in few word phrases in both English and Citizen of [place]  HEENT:  normocephalic, atraumatic EOMI, PERRLA   Neck: neck supple   CVS: s1, s2+ regular rate and rhythm no audible murmurs   Resp: bilateral air entry, no wheezing, rales, or rhonchi   GI:  abdomen soft, nontender, nondistended, bowel sounds +   :  no suprapubic tenderness , gomez catheter in place + urine output   Musc:   weakness, +edema LLE, LE asymmetry L>R  Neuro:  no focal findings, able to move upper extremities, but has weakness b/l LE   Psych:   appears calm, no agitation   Skin: intact   Lymph: WNL   Preadmit Karnofsky: 30-40 %           Current Karnofsky:  30   %  Cachexia (Y/N): No   BMI: 27.1     ADVANCED DIRECTIVES:     DNR/DNI     MOLST    DECISION MAKER: Patient is currently not capacitated to make informed medical decisions for herself d/t underlying dementia.   LEGAL SURROGATE: Per [facility], next of kin would service as surrogate decision maker: Lorrie Schmitt [phone]    GOALS OF CARE DISCUSSION       Palliative care info/counseling provided	           Advanced Directives addressed please see Advance Care Planning Note	           Documentation of GOC: Daughter would like continue management of acute illness process- urosepsis wiht IV antibiotics and continued weaning off Levophed. Code status: DNR/DNI. Goal is to take patient home once she is medically optimized again with 24 hour care          REFERRALS	        Palliative Med        Unit SW/Case Mgmt       Speech/Swallow       Patient/Family Support       Nutrition       PT/OT

## 2019-04-29 NOTE — PROGRESS NOTE ADULT - SUBJECTIVE AND OBJECTIVE BOX
[facility]: [facility]  [facility] Hospital Course:  73F w PMH CAD s/p stents (>10 years ago), HTN, HLD, anemia, seizure disorder (dx 01/2019; on Keppra), dementia, depression, recent admission in 02/2019 for UTI, presented from home after labs by home physician showed ALE (baseline Cr 1.23 -> 6.44). Presenting with decreased urination, LLE pain, patient has been genreally bedbound since leaving nursing home a week prior to admission. In ED, T: 97.9, P: 71, BP: 92/65, RR: 16, O2: 96% RA. Labs BUN/Cr: 86/6.44, K: 6.7, bicarb: 16, P: 5. ECG w/ peaking T waves. Fierro placed, given ceftriaxone for UTI positive on U/A, and insulin/d50/kayexalate for hyperK. MOLST filled, patient DNR/DNI but still wishing to have abx, IVF, pressors, and medical management. Started on vanc/zosyn. Dopplers b/l LE positive for extensive b/l DVTs. Echo: LV wall motion normal, EF 55-60%, normal LA/RA, calcified aortic valve minimal AS, trace TR, no pHTN. Renal U/S showing bilateral renal cysts, medical renal disease, angiomyolipoma. Vanc discontinued as urine culture positive for ESBL E.coli, s/p 1 dose of meropenem, now on ertapenem (last dose 5/9). Midodrine 10 mg q8h started on 4/26, patient has not been requiring levophed for 2 days. Intermittently refuses medications and expresses suicidal ideations, psych started risperidone. Switched lacosamide and keppra to IV due to intermittent refusal. Off heparin today, switched to loading dose of eliquis for DVTs. Stable for continued management on regional medical floors for complicated UTI.    SUBJECTIVE / INTERVAL HPI: Patient seen and examined at bedside.     VITAL SIGNS:  Vital Signs Last 24 Hrs  T(C): 36.1 (29 Apr 2019 09:22), Max: 36.7 (28 Apr 2019 19:32)  T(F): 97 (29 Apr 2019 09:22), Max: 98.1 (28 Apr 2019 19:32)  HR: 58 (29 Apr 2019 11:00) (58 - 100)  BP: 103/59 (29 Apr 2019 11:00) (71/50 - 133/68)  BP(mean): 70 (29 Apr 2019 11:00) (57 - 90)  RR: 12 (29 Apr 2019 11:00) (12 - 20)  SpO2: 98% (29 Apr 2019 11:00) (95% - 100%)    PHYSICAL EXAM:    General: WDWN  HEENT: NC/AT; PERRL, anicteric sclera; MMM  Neck: supple  Cardiovascular: +S1/S2, RRR  Respiratory: CTA B/L; no W/R/R  Gastrointestinal: soft, NT/ND; +BSx4  Extremities: WWP; no edema, clubbing or cyanosis  Vascular: 2+ radial, DP/PT pulses B/L  Neurological: AAOx3; no focal deficits    MEDICATIONS:  MEDICATIONS  (STANDING):  apixaban 10 milliGRAM(s) Oral every 12 hours  atorvastatin 10 milliGRAM(s) Oral at bedtime  chlorhexidine 2% Cloths 1 Application(s) Topical <User Schedule>  donepezil 10 milliGRAM(s) Oral at bedtime  ertapenem  IVPB 500 milliGRAM(s) IV Intermittent every 24 hours  escitalopram 10 milliGRAM(s) Oral daily  ferrous    sulfate 325 milliGRAM(s) Oral daily  influenza   Vaccine 0.5 milliLiter(s) IntraMuscular once  lacosamide IVPB 150 milliGRAM(s) IV Intermittent every 12 hours  lactated ringers. 1000 milliLiter(s) (75 mL/Hr) IV Continuous <Continuous>  levETIRAcetam  IVPB 500 milliGRAM(s) IV Intermittent every 12 hours  memantine 5 milliGRAM(s) Oral daily  midodrine 10 milliGRAM(s) Oral every 8 hours  mirtazapine 15 milliGRAM(s) Oral at bedtime  pantoprazole  Injectable 40 milliGRAM(s) IV Push daily  risperiDONE   Tablet 0.25 milliGRAM(s) Oral two times a day    MEDICATIONS  (PRN):      ALLERGIES:  Allergies    No Known Allergies    Intolerances        LABS:                        7.2    6.96  )-----------( 310      ( 29 Apr 2019 05:14 )             23.2     04-29    140  |  110<H>  |  47<H>  ----------------------------<  107<H>  4.4   |  15<L>  |  2.17<H>    Ca    9.1      29 Apr 2019 05:14  Phos  3.5     04-29  Mg     1.9     04-29      PTT - ( 29 Apr 2019 01:46 )  PTT:50.5 sec    CAPILLARY BLOOD GLUCOSE          RADIOLOGY & ADDITIONAL TESTS: Reviewed. [facility]  [facility] Course:  73F w PMH CAD s/p stents (>10 years ago), HTN, HLD, anemia, seizure disorder (dx 01/2019; on Keppra), dementia, depression, recent admission in 02/2019 for UTI, presented from home after labs by home physician showed ALE (baseline Cr 1.23 -> 6.44). Presenting with decreased urination, LLE pain, patient has been genreally bedbound since leaving nursing home a week prior to admission. In ED, T: 97.9, P: 71, BP: 92/65, RR: 16, O2: 96% RA. Labs BUN/Cr: 86/6.44, K: 6.7, bicarb: 16, P: 5. ECG w/ peaking T waves. Fierro placed, given ceftriaxone for UTI positive on U/A, and insulin/d50/kayexalate for hyperK. MOLST filled, patient DNR/DNI but still wishing to have abx, IVF, pressors, and medical management. Started on vanc/zosyn. Dopplers b/l LE positive for extensive b/l DVTs. Echo: LV wall motion normal, EF 55-60%, normal LA/RA, calcified aortic valve minimal AS, trace TR, no pHTN. Renal U/S showing bilateral renal cysts, medical renal disease, angiomyolipoma. Vanc discontinued as urine culture positive for ESBL E.coli, s/p 1 dose of meropenem, now on ertapenem (last dose 5/9). Midodrine 10 mg q8h started on 4/26, patient has not been requiring levophed for 2 days. Intermittently refuses medications and expresses suicidal ideations, psych started risperidone. Switched lacosamide and keppra to IV due to intermittent refusal. Off heparin today, switched to loading dose of eliquis for DVTs. Stable for continued management on regional medical floors for complicated UTI.    SUBJECTIVE / INTERVAL HPI: Patient seen and examined at bedside. doesn't like speaking with psychiatry. continues to have dysuria but no suprapubic pain. has nausea. no headache, trouble breathing, vomiting, chest pain.    VITAL SIGNS:  Vital Signs Last 24 Hrs  T(C): 36.1 (29 Apr 2019 09:22), Max: 36.7 (28 Apr 2019 19:32)  T(F): 97 (29 Apr 2019 09:22), Max: 98.1 (28 Apr 2019 19:32)  HR: 58 (29 Apr 2019 11:00) (58 - 100)  BP: 103/59 (29 Apr 2019 11:00) (71/50 - 133/68)  BP(mean): 70 (29 Apr 2019 11:00) (57 - 90)  RR: 12 (29 Apr 2019 11:00) (12 - 20)  SpO2: 98% (29 Apr 2019 11:00) (95% - 100%)    PHYSICAL EXAM:    General: thin elderly female in NAD; lying in bed  HEENT: healing bruise noted on R forehead; MMM  Neck: supple  Cardiovascular: +S1/S2, RRR  Respiratory: CTA B/L; no W/R/R  Gastrointestinal: soft, NT/ND; +BSx4  Extremities: WWP; no edema, clubbing or cyanosis  Vascular: 2+ radial, DP/PT pulses B/L  Neurological: AAOx1 to self; depressed affect    MEDICATIONS:  MEDICATIONS  (STANDING):  apixaban 10 milliGRAM(s) Oral every 12 hours  atorvastatin 10 milliGRAM(s) Oral at bedtime  chlorhexidine 2% Cloths 1 Application(s) Topical <User Schedule>  donepezil 10 milliGRAM(s) Oral at bedtime  ertapenem  IVPB 500 milliGRAM(s) IV Intermittent every 24 hours  escitalopram 10 milliGRAM(s) Oral daily  ferrous    sulfate 325 milliGRAM(s) Oral daily  influenza   Vaccine 0.5 milliLiter(s) IntraMuscular once  lacosamide IVPB 150 milliGRAM(s) IV Intermittent every 12 hours  lactated ringers. 1000 milliLiter(s) (75 mL/Hr) IV Continuous <Continuous>  levETIRAcetam  IVPB 500 milliGRAM(s) IV Intermittent every 12 hours  memantine 5 milliGRAM(s) Oral daily  midodrine 10 milliGRAM(s) Oral every 8 hours  mirtazapine 15 milliGRAM(s) Oral at bedtime  pantoprazole  Injectable 40 milliGRAM(s) IV Push daily  risperiDONE   Tablet 0.25 milliGRAM(s) Oral two times a day    MEDICATIONS  (PRN):      ALLERGIES:  Allergies    No Known Allergies    Intolerances        LABS:                        7.2    6.96  )-----------( 310      ( 29 Apr 2019 05:14 )             23.2     04-29    140  |  110<H>  |  47<H>  ----------------------------<  107<H>  4.4   |  15<L>  |  2.17<H>    Ca    9.1      29 Apr 2019 05:14  Phos  3.5     04-29  Mg     1.9     04-29      PTT - ( 29 Apr 2019 01:46 )  PTT:50.5 sec    CAPILLARY BLOOD GLUCOSE          RADIOLOGY & ADDITIONAL TESTS: Reviewed.

## 2019-04-30 ENCOUNTER — TRANSCRIPTION ENCOUNTER (OUTPATIENT)
Age: 74
End: 2019-04-30

## 2019-04-30 LAB
ANION GAP SERPL CALC-SCNC: 16 MMOL/L — SIGNIFICANT CHANGE UP (ref 5–17)
BUN SERPL-MCNC: 43 MG/DL — HIGH (ref 7–23)
CALCIUM SERPL-MCNC: 9.4 MG/DL — SIGNIFICANT CHANGE UP (ref 8.4–10.5)
CHLORIDE SERPL-SCNC: 112 MMOL/L — HIGH (ref 96–108)
CO2 SERPL-SCNC: 15 MMOL/L — LOW (ref 22–31)
CREAT SERPL-MCNC: 1.85 MG/DL — HIGH (ref 0.5–1.3)
EXTRA LAVENDER TOP TUBE: SIGNIFICANT CHANGE UP
GLUCOSE SERPL-MCNC: 99 MG/DL — SIGNIFICANT CHANGE UP (ref 70–99)
HCT VFR BLD CALC: 24 % — LOW (ref 34.5–45)
HGB BLD-MCNC: 7.2 G/DL — LOW (ref 11.5–15.5)
MAGNESIUM SERPL-MCNC: 1.7 MG/DL — SIGNIFICANT CHANGE UP (ref 1.6–2.6)
MCHC RBC-ENTMCNC: 28.7 PG — SIGNIFICANT CHANGE UP (ref 27–34)
MCHC RBC-ENTMCNC: 30 GM/DL — LOW (ref 32–36)
MCV RBC AUTO: 95.6 FL — SIGNIFICANT CHANGE UP (ref 80–100)
NRBC # BLD: 0 /100 WBCS — SIGNIFICANT CHANGE UP (ref 0–0)
PHOSPHATE SERPL-MCNC: 3.2 MG/DL — SIGNIFICANT CHANGE UP (ref 2.5–4.5)
PLATELET # BLD AUTO: 267 K/UL — SIGNIFICANT CHANGE UP (ref 150–400)
POTASSIUM SERPL-MCNC: 4.1 MMOL/L — SIGNIFICANT CHANGE UP (ref 3.5–5.3)
POTASSIUM SERPL-SCNC: 4.1 MMOL/L — SIGNIFICANT CHANGE UP (ref 3.5–5.3)
RBC # BLD: 2.51 M/UL — LOW (ref 3.8–5.2)
RBC # FLD: 18.6 % — HIGH (ref 10.3–14.5)
SODIUM SERPL-SCNC: 143 MMOL/L — SIGNIFICANT CHANGE UP (ref 135–145)
WBC # BLD: 6.51 K/UL — SIGNIFICANT CHANGE UP (ref 3.8–10.5)
WBC # FLD AUTO: 6.51 K/UL — SIGNIFICANT CHANGE UP (ref 3.8–10.5)

## 2019-04-30 PROCEDURE — 99233 SBSQ HOSP IP/OBS HIGH 50: CPT

## 2019-04-30 PROCEDURE — 99231 SBSQ HOSP IP/OBS SF/LOW 25: CPT

## 2019-04-30 RX ORDER — MIDODRINE HYDROCHLORIDE 2.5 MG/1
1 TABLET ORAL
Qty: 0 | Refills: 0 | DISCHARGE
Start: 2019-04-30

## 2019-04-30 RX ORDER — APIXABAN 2.5 MG/1
1 TABLET, FILM COATED ORAL
Qty: 0 | Refills: 0 | COMMUNITY
Start: 2019-04-30

## 2019-04-30 RX ORDER — LEVETIRACETAM 250 MG/1
500 TABLET, FILM COATED ORAL
Qty: 0 | Refills: 0 | Status: DISCONTINUED | OUTPATIENT
Start: 2019-04-30 | End: 2019-05-01

## 2019-04-30 RX ORDER — RISPERIDONE 4 MG/1
0.25 TABLET ORAL AT BEDTIME
Qty: 0 | Refills: 0 | Status: DISCONTINUED | OUTPATIENT
Start: 2019-04-30 | End: 2019-05-01

## 2019-04-30 RX ORDER — APIXABAN 2.5 MG/1
1 TABLET, FILM COATED ORAL
Qty: 0 | Refills: 0 | DISCHARGE
Start: 2019-04-30

## 2019-04-30 RX ORDER — APIXABAN 2.5 MG/1
2 TABLET, FILM COATED ORAL
Qty: 0 | Refills: 0 | DISCHARGE
Start: 2019-04-30

## 2019-04-30 RX ORDER — LACOSAMIDE 50 MG/1
150 TABLET ORAL
Qty: 0 | Refills: 0 | Status: DISCONTINUED | OUTPATIENT
Start: 2019-04-30 | End: 2019-05-01

## 2019-04-30 RX ORDER — MEROPENEM 1 G/30ML
500 INJECTION INTRAVENOUS
Qty: 0 | Refills: 0 | COMMUNITY
Start: 2019-04-30

## 2019-04-30 RX ORDER — RISPERIDONE 4 MG/1
1 TABLET ORAL
Qty: 0 | Refills: 0 | DISCHARGE
Start: 2019-04-30

## 2019-04-30 RX ORDER — ACETAMINOPHEN 500 MG
1000 TABLET ORAL ONCE
Qty: 0 | Refills: 0 | Status: COMPLETED | OUTPATIENT
Start: 2019-04-30 | End: 2019-04-30

## 2019-04-30 RX ORDER — MEROPENEM 1 G/30ML
500 INJECTION INTRAVENOUS EVERY 12 HOURS
Qty: 0 | Refills: 0 | Status: DISCONTINUED | OUTPATIENT
Start: 2019-05-01 | End: 2019-05-01

## 2019-04-30 RX ORDER — PANTOPRAZOLE SODIUM 20 MG/1
40 TABLET, DELAYED RELEASE ORAL
Qty: 0 | Refills: 0 | Status: DISCONTINUED | OUTPATIENT
Start: 2019-04-30 | End: 2019-05-01

## 2019-04-30 RX ORDER — LISINOPRIL 2.5 MG/1
1 TABLET ORAL
Qty: 0 | Refills: 0 | COMMUNITY

## 2019-04-30 RX ADMIN — LEVETIRACETAM 400 MILLIGRAM(S): 250 TABLET, FILM COATED ORAL at 06:42

## 2019-04-30 RX ADMIN — MIDODRINE HYDROCHLORIDE 10 MILLIGRAM(S): 2.5 TABLET ORAL at 09:55

## 2019-04-30 RX ADMIN — LEVETIRACETAM 500 MILLIGRAM(S): 250 TABLET, FILM COATED ORAL at 18:07

## 2019-04-30 RX ADMIN — MIDODRINE HYDROCHLORIDE 10 MILLIGRAM(S): 2.5 TABLET ORAL at 18:07

## 2019-04-30 RX ADMIN — MEMANTINE HYDROCHLORIDE 5 MILLIGRAM(S): 10 TABLET ORAL at 12:48

## 2019-04-30 RX ADMIN — LACOSAMIDE 150 MILLIGRAM(S): 50 TABLET ORAL at 18:07

## 2019-04-30 RX ADMIN — ESCITALOPRAM OXALATE 10 MILLIGRAM(S): 10 TABLET, FILM COATED ORAL at 12:48

## 2019-04-30 RX ADMIN — LACOSAMIDE 130 MILLIGRAM(S): 50 TABLET ORAL at 12:46

## 2019-04-30 RX ADMIN — Medication 325 MILLIGRAM(S): at 12:48

## 2019-04-30 RX ADMIN — PANTOPRAZOLE SODIUM 40 MILLIGRAM(S): 20 TABLET, DELAYED RELEASE ORAL at 12:48

## 2019-04-30 RX ADMIN — MIDODRINE HYDROCHLORIDE 10 MILLIGRAM(S): 2.5 TABLET ORAL at 00:08

## 2019-04-30 RX ADMIN — CHLORHEXIDINE GLUCONATE 1 APPLICATION(S): 213 SOLUTION TOPICAL at 06:41

## 2019-04-30 RX ADMIN — LACOSAMIDE 130 MILLIGRAM(S): 50 TABLET ORAL at 00:09

## 2019-04-30 RX ADMIN — RISPERIDONE 0.25 MILLIGRAM(S): 4 TABLET ORAL at 06:42

## 2019-04-30 RX ADMIN — ERTAPENEM SODIUM 100 MILLIGRAM(S): 1 INJECTION, POWDER, LYOPHILIZED, FOR SOLUTION INTRAMUSCULAR; INTRAVENOUS at 00:09

## 2019-04-30 RX ADMIN — APIXABAN 10 MILLIGRAM(S): 2.5 TABLET, FILM COATED ORAL at 11:56

## 2019-04-30 RX ADMIN — Medication 400 MILLIGRAM(S): at 16:50

## 2019-04-30 RX ADMIN — APIXABAN 10 MILLIGRAM(S): 2.5 TABLET, FILM COATED ORAL at 00:12

## 2019-04-30 NOTE — PROGRESS NOTE ADULT - PROVIDER SPECIALTY LIST ADULT
Infectious Disease
Infectious Disease
Internal Medicine
Internal Medicine
MICU
Palliative Care
Palliative Care
Internal Medicine
Internal Medicine

## 2019-04-30 NOTE — PROGRESS NOTE BEHAVIORAL HEALTH - AXIS III
CAD s/p stents (>10 years ago), HTN, HLD, anemia, seizure disorder

## 2019-04-30 NOTE — PROGRESS NOTE ADULT - PROBLEM SELECTOR PLAN 1
LLE pain and edema noted x2-3 days by MICU team. dopplers showed extensive DVTs b/l w extension into common femoral vein; pt's risk factors include functional quadriplegia   - previously on heparin - switched to eliquis 4/29  - consider vascular consult given b/l and extensive nature of DVTs
LLE pain and edema noted x2-3 days by MICU team. dopplers showed extensive DVTs b/l w extension into common femoral vein; pt's risk factors include functional quadriplegia   - previously on heparin - switched to eliquis 4/29  - consider vascular consult given b/l and extensive nature of DVTs
related to infection and sepsis.  BUN/creat continues to improve
related to infection and sepsis.  Improving slowly  No longer requiring pressors
LLE pain and edema noted x2-3 days by MICU team. dopplers showed extensive DVTs b/l w extension into common femoral vein; pt's risk factors include functional quadriplegia   - previously on heparin - switched to eliquis 4/29  - consider vascular consult given b/l and extensive nature of DVTs
LLE pain and edema noted x2-3 days by MICU team. dopplers showed extensive DVTs b/l w extension into common femoral vein; pt's risk factors include functional quadriplegia   - previously on heparin - switched to eliquis 4/29  - consider vascular consult given b/l and extensive nature of DVTs

## 2019-04-30 NOTE — PROGRESS NOTE BEHAVIORAL HEALTH - NSBHFUPSUICINTERVALFT_PSY_A_CORE
Pt endorsed SI over the past two days to jump out of the window. Currently she denies SI/intent or plan.
Pt endorsed SI over the past two days to jump out of the window. Currently she denies SI/intent or plan.

## 2019-04-30 NOTE — DISCHARGE NOTE PROVIDER - CARE PROVIDER_API CALL
Jose Reina)  Internal Medicine  [address] [address]  Phone: [phone]  Fax: [phone]  Follow Up Time:

## 2019-04-30 NOTE — DISCHARGE NOTE PROVIDER - HOSPITAL COURSE
73F w PMH CAD s/p stents (>10 years ago), HTN, HLD, anemia, seizure disorder (dx 01/2019; on Keppra), dementia, depression, recent admission in 02/2019 for UTI, presented from home after labs by home physician showed ALE (baseline Cr 1.23 -> 6.44). Presenting with decreased urination, LLE pain, patient has been genreally bedbound since leaving nursing home a week prior to admission. In ED, T: 97.9, P: 71, BP: 92/65, RR: 16, O2: 96% RA. Labs BUN/Cr: 86/6.44, K: 6.7, bicarb: 16, P: 5. ECG w/ peaking T waves. Fierro placed, given ceftriaxone for UTI positive on U/A, and insulin/d50/kayexalate for hyperK. MOLST filled, patient DNR/DNI but still wishing to have abx, IVF, pressors, and medical management. Started on vanc/zosyn. Dopplers b/l LE positive for extensive b/l DVTs. Echo: LV wall motion normal, EF 55-60%, normal LA/RA, calcified aortic valve minimal AS, trace TR, no pHTN. Renal U/S showing bilateral renal cysts, medical renal disease, angiomyolipoma. Vanc discontinued as urine culture positive for ESBL E.coli, s/p 1 dose of meropenem, now on ertapenem (last dose 5/9). Midodrine 10 mg q8h started on 4/26, patient has not been requiring levophed for 2 days. Intermittently refuses medications and expresses suicidal ideations, psych started risperidone. Switched lacosamide and keppra to IV due to intermittent refusal. Off heparin today, switched to loading dose of eliquis for DVTs. Stable for continued management on regional medical floors for complicated UTI.

## 2019-04-30 NOTE — PROGRESS NOTE ADULT - SUBJECTIVE AND OBJECTIVE BOX
NICKI GATES          MRN-[id number]            (1945)    HPI:  A 73 year old female with PMH CAD s/p stents (>10 years ago), HTN, HLD, anemia, seizure disorder (dx 2019; on Keppra), dementia, depression, recent admission in 2019 for UTI who presents from home after labs performed by home physician showed ALE (86/6.44, baseline Cr 1.23). Per patient's daughter, patient had bloodwork obtained for regular checkup on . PCP noted significantly elevated BUN/CR (compared to bloodwork from nursing home on ) and sent patient to ED. Daughter/aide has noticed decreased urination for possibly 2-3days (uncertain duration as pt uses diapers). Also patient has been complaining of LLE pain x3days. Today, with complaints of abdomen and back pain. No reports of f/c/n/v/d/c, CP, ab pain, dyspnea, dysuria, hematuria, bloody stools, melena, sick contacts. Of note, daughter reports pt has had kidney problems since childhood (unspecified) and frequently gets kidney stones and UTIs. Pt's mental status decreased since January after being having a seizure, however no acute alterations. Also per daughter, mother has been in nursing home since January until one week ago. She is currently at home with 24hr aide. For the last 2-3 months, has had decreased PO intake and weight loss.     HOSPITAL COURSE:   In ED, T: 97.9, P: 71, BP: 92/65, RR: 16, O2: 96% RA. Labs BUN/Cr: 86/6.44, K: 6.7, bicarb: 16, P: 5. ECG w/ peaking T waves. Gomez placed which drained only a few mL. U/a suggesting UTI. Patient was given ceftriaxone for UTI and insulin/d50/kayexalate for hyperkalemia. Patient's BP downtrended in the ED to sBP in 70's. Patient received 3L of NS with minimal improvement and ultimately started on Levo. (2019 19:38). Patient was then admitted to the MICU For further management of sepsis 2/2 urinary tract infection and acute kidney injury. Based on urine culture sensitivities was switched to Merrem, no longer on minimal pressors (Levophed 0.06mcg). Per primary care team discussion with daughter- patient is DNR/DNI. Palliative consulted to further assist with GOC    SUBJECTIVE:   Patient is alert, awake, responsive to questioning, disoriented to person, time, or place.  Tells me that she feels "crazy in my mind." Insists on speaking only Burkinan today Apparently over the weekend has said that she wants to die.  Seen by psychiatry on the weekend.  Medications adjusted  Seen by Dr. Morrison this afternoon who recommended decrease in Respiridone  Lying in the bed, appears comfortable and in no apparent distress.    Communicative with few word phrases- able to vocalize complaints- reports discomfort in buttocks area- requested to be repositioned.  Denies any other complaints of pain, shortness of breath, nausea, vomiting.   Remains on IV antibiotics, , gomez catheter in place with clear urine +  Remains on contact isolation for ESBL+ in urine culture.  Daughter on way to [place], has been talking with social work            ROS:            Dyspnea (Sujatha 0-10):    0/10               N/V (Y/N):  No                       Secretions (Y/N) : No                 Agitation(Y/N):  No - appears calm   Pain (Y/N):    Denies any pain- reported slight discomfort in buttocks- requested RN to reposition patient.   -Provocation/Palliation:  -Quality/Quantity:  -Radiating:  -Severity:  -Timing/Frequency:  -Impact on ADLs:    General:  Denied  HEENT:    Denied  Neck:  Denied  CVS:  Denied  Resp:  Denied  GI:  Denied  :  Denied  Musc:  Denied  Neuro:  Denied  Psych:  Denied  Skin:  Denied  Lymph:  Denied    ALLERGIES:     No Known Allergies    Intolerances    OPIATE NAIVE (Y/N): Yes    -iStop reviewed (Y/N): Yes (Ref#: [id number])  Rx found for Alprazolam 0.25mg, Vimpat 200mg.     MEDICATIONS:      MEDICATIONS  (STANDING):  apixaban 10 milliGRAM(s) Oral every 12 hours  atorvastatin 10 milliGRAM(s) Oral at bedtime  chlorhexidine 2% Cloths 1 Application(s) Topical <User Schedule>  donepezil 10 milliGRAM(s) Oral at bedtime  escitalopram 10 milliGRAM(s) Oral daily  ferrous    sulfate 325 milliGRAM(s) Oral daily  influenza   Vaccine 0.5 milliLiter(s) IntraMuscular once  lacosamide 150 milliGRAM(s) Oral two times a day  levETIRAcetam 500 milliGRAM(s) Oral two times a day  memantine 5 milliGRAM(s) Oral daily  midodrine 10 milliGRAM(s) Oral every 8 hours  mirtazapine 15 milliGRAM(s) Oral at bedtime  pantoprazole    Tablet 40 milliGRAM(s) Oral before breakfast  risperiDONE   Tablet 0.25 milliGRAM(s) Oral at bedtime    MEDICATIONS  (PRN):          LABS:  Reviewed                                    7.2    6.51  )-----------( 267      ( 2019 05:44 )             24.0   0430    143  |  112<H>  |  43<H>  ----------------------------<  99  4.1   |  15<L>  |  1.85<H>    Ca    9.4      2019 06:49  Phos  3.2       Mg     1.7                 Urinalysis Basic - ( 2019 16:15 )    Color: Yellow / Appearance: Cloudy / S.025 / pH: x  Gluc: x / Ketone: Trace mg/dL  / Bili: Moderate / Urobili: 0.2 E.U./dL   Blood: x / Protein: 100 mg/dL / Nitrite: NEGATIVE   Leuk Esterase: Large / RBC: < 5 /HPF / WBC Many /HPF   Sq Epi: x / Non Sq Epi: 0-5 /HPF / Bacteria: Present /HPF    Culture - Blood (collected 2019 22:05)  Source: .Blood None  Preliminary Report (2019 23:00):    No growth at 1 day.    Culture - Blood (collected 2019 21:47)  Source: .Blood Blood  Preliminary Report (2019 22:00):    No growth at 1 day.    Culture - Urine (collected 2019 17:34)  Source: Catheterized Catheterized  Preliminary Report (2019 10:03):    >100,000 CFU/ml Escherichia coli ESBL    Additional  Susceptibility to follow.  Organism: Escherichia coli ESBL (2019 10:01)  Organism: Escherichia coli ESBL (2019 10:01)    IMAGING:  Reviewed  Xray Chest 1 View- PORTABLE-Urgent (19 @ 07:36)  EXAM:  XR CHEST PORTABLE URGENT 1V                        PROCEDURE DATE:  2019    INTERPRETATION:  Portable chest  History: Shortness of breath  Impression:  Minimal focal atelectasis left lung base. Lungs otherwise grossly clear.   No pneumothorax. Possible small left pleural effusion. No acute bone   abnormality. Limited rotated exam.    US Duplex Venous Lower Ext Complete, Bilateral (19 @ 15:59)   EXAM:  US DPLX LWR EXT VEINS COMPL BI                        PROCEDURE DATE:  2019    INTERPRETATION:    VENOUS DUPLEX DOPPLER OF BOTH LOWER EXTREMITIES dated 2019  INDICATION: Left lower extremity swelling an pain. Evaluate for DVT.  TECHNIQUE: Duplex Doppler evaluation including gray-scale ultrasound   imaging, color flow Doppler imaging, and Doppler spectral analysis of the   veins of both lower extremities was performed.   COMPARISON: None  FINDINGS:  RIGHT LOWER EXTREMITY:  There is noncompressibility and lack of phasic flow within the RIGHT   common femoral, proximal femoral and proximal greater saphenous vein   consistent with deep venous thrombosis. The mid to distal femoral,   popliteal and proximal deep femoral veins are patent.  The paired RIGHT peroneal and posterior tibial calf veins are not   adequately visualized due to soft tissue swelling and edema.  LEFT LOWER EXTREMITY:  There is noncompressibility and lack of phasic flow within the LEFT   common femoral, femoral, popliteal, proximal deep femoral and proximal   greater saphenous veins consistent with deep vein thrombosis.  The paired LEFT peroneal and posterior tibial calf veins are not   adequately visualized due to soft tissue swelling and edema.  IMPRESSION:  Extensive bilateral deep vein thromboses as above.    US Retroperitoneal B-Scan Limited (19 @ 16:34)   EXAM:  US RETROPERITONEAL LIMITED                        PROCEDURE DATE:  2019    INTERPRETATION:  RENAL ULTRASOUND dated 2019  Indication: Acute kidney injury.  Comparison: None  IMPRESSION:  1.  Diffusely increased renal echogenicity bilaterally compatible with   medical renal disease.  2.  Bilateral renal cysts, the largest measures 2.2 cm in the right renal   midportion and contains a single thin internal septation (Bosniak II).  3.  1.2 cm cortically-based hyperechoic lesion in left lower pole kidney,   possibly angiomyolipoma but may be confirmed by renal protocol MRI or CT.    PEx:  Vital Signs Last 24 Hrs  T(C): 36.4 (2019 09:53), Max: 37.1 (2019 22:00)  T(F): 97.5 (2019 09:53), Max: 98.8 (2019 22:00)  HR: 93 (2019 11:10) (80 - 100)  BP: 116/78 (2019 11:10) (96/65 - 120/74)  BP(mean): --  RR: 18 (2019 09:53) (16 - 18)  SpO2: 95% (2019 11:10) (95% - 100%)        General: elderly, frail lady, alert/awake, disoriented to person, time, or place, but responsive/communicative in few word phrasesbut today only speaking Burkinan  HEENT:  normocephalic, atraumatic EOMI, PERRLA   Neck: neck supple   CVS: s1, s2+ regular rate and rhythm no audible murmurs   Resp: bilateral air entry, no wheezing, rales, or rhonchi   GI:  abdomen soft, nontender, nondistended, bowel sounds +   :  no suprapubic tenderness , gomez catheter in place + urine output   Musc:   weakness, +edema LLE, LE asymmetry L>R  Neuro:  no focal findings, able to move upper extremities, but has weakness b/l LE   Psych:   appears calm, no agitation   Skin: intact   Lymph: WNL   Preadmit Karnofsky: 30-40 %           Current Karnofsky:  30   %  Cachexia (Y/N): No   BMI: 27.1     ADVANCED DIRECTIVES:     DNR/DNI     MOLST    DECISION MAKER: Patient is currently not capacitated to make informed medical decisions for herself d/t underlying dementia.   LEGAL SURROGATE: Per [facility], next of kin would service as surrogate decision maker: Lorrie Schmitt [phone]    GOALS OF CARE DISCUSSION       Palliative care info/counseling provided	           Advanced Directives addressed please see Advance Care Planning Note	           Documentation of GOC: Daughter would like continue management of acute illness process- urosepsis wiht IV antibiotics and continued weaning off Levophed. Code status: DNR/DNI. Goal is to take patient home once she is medically optimized again with 24 hour care          REFERRALS	        Palliative Med        Unit SW/Case Mgmt       Speech/Swallow       Patient/Family Support       Nutrition       PT/OT

## 2019-04-30 NOTE — PROGRESS NOTE BEHAVIORAL HEALTH - NSBHCHARTREVIEWVS_PSY_A_CORE FT
Vital Signs Last 24 Hrs  T(C): 35.3 (29 Apr 2019 14:34), Max: 36.6 (29 Apr 2019 06:02)  T(F): 95.5 (29 Apr 2019 14:34), Max: 97.9 (29 Apr 2019 06:02)  HR: 84 (29 Apr 2019 16:00) (58 - 100)  BP: 117/70 (29 Apr 2019 16:00) (98/58 - 148/83)  BP(mean): 81 (29 Apr 2019 16:00) (63 - 98)  RR: 12 (29 Apr 2019 16:00) (12 - 20)  SpO2: 95% (29 Apr 2019 16:00) (95% - 99%)
Vital Signs Last 24 Hrs  T(C): 35.3 (29 Apr 2019 14:34), Max: 36.6 (29 Apr 2019 06:02)  T(F): 95.5 (29 Apr 2019 14:34), Max: 97.9 (29 Apr 2019 06:02)  HR: 84 (29 Apr 2019 16:00) (58 - 100)  BP: 117/70 (29 Apr 2019 16:00) (98/58 - 148/83)  BP(mean): 81 (29 Apr 2019 16:00) (63 - 98)  RR: 12 (29 Apr 2019 16:00) (12 - 20)  SpO2: 95% (29 Apr 2019 16:00) (95% - 99%)

## 2019-04-30 NOTE — PROGRESS NOTE BEHAVIORAL HEALTH - NSBHFUPINTERVALHXFT_PSY_A_CORE
74 yo [place] Timorese F w PMH CAD s/p stents (>10 years ago), HTN, HLD, anemia, seizure disorder (dx 01/2019; on Keppra), dementia, depression, recent admission in 02/2019 for UTI who presents from home after labs performed by home physician showed ALE (86/6.44, baseline Cr 1.23). Per patient's daughter, patient had blood work obtained for regular checkup on 4/23. PCP noted significantly elevated BUN/CR (compared to blood work from nursing home on 4/16) and sent patient to ED. Daughter/aide has noticed decreased urination for possibly 2-3days (uncertain duration as pt uses diapers). Also patient has been complaining of LLE pain x3days.  Pt's mental status decreased since January after having a seizure, however no acute alterations. Also per daughter, mother has been in nursing home since January until one week ago. She is currently at home with 24hr aide. For the last 2-3 months, has had decreased PO intake and weight loss. Pt is now admitted with urosepsis. Psychiatry was consulted on 4/26, on exam pt cooperative, pleasant but disoriented to place/time, which is reported to be pt's baseline.    Today patient refused medications and psychiatry was consulted to capacity to refuse medications and to assess for underlying psychiatric issues. Patient calm, cooperative, not AO x 3 (has trouble spelling her first name, does not know date, month, year, president, location). Patient doesn't know why she is in the hospital, reports that her memory is very bad and that she feels very depressed. Patient tearful multiple points throughout interview, oftentimes crying when she is unable to answer a question because she feels that her mind is gone. She denies SI/HI/VH, however does she endorse AH at times of her brother talking to her, reassuring her that everything is going to be okay. She does also endorse chronic PI, reports feeling like other people do not like her and do not want her to be around. Patient has no memory of any psychiatric care, and reports that she is amenable to trial of an antipsychotic medication to address symptoms of paranoia and associated sleep disturbance.
On this evaluation pt was calm and cooperative, oriented to self and "hospital", not able to recall the name of the hospital.   Pt denies SI, stating she likes spending time with her family and is looking forward to going home.
On this evaluation pt was calm and cooperative, oriented to self only, stating she wanted to be "free", pointing tugging at wrist restraints. Pt was more compliant with her care today.   She was not able to participate in the meaningful conversation secondary to confusion however.

## 2019-04-30 NOTE — PROGRESS NOTE ADULT - SUBJECTIVE AND OBJECTIVE BOX
OVERNIGHT EVENTS: JERO    SUBJECTIVE / INTERVAL HPI: abdominal discomfort    VITAL SIGNS:  Vital Signs Last 24 Hrs  T(C): 36.7 (30 Apr 2019 05:06), Max: 37.1 (29 Apr 2019 22:00)  T(F): 98.1 (30 Apr 2019 05:06), Max: 98.8 (29 Apr 2019 22:00)  HR: 80 (30 Apr 2019 05:06) (58 - 100)  BP: 112/74 (30 Apr 2019 05:06) (100/57 - 148/83)  BP(mean): 81 (29 Apr 2019 16:00) (63 - 98)  RR: 18 (30 Apr 2019 05:06) (12 - 18)  SpO2: 98% (30 Apr 2019 05:06) (95% - 98%)    PHYSICAL EXAM:  General: thin elderly female in NAD; lying in bed; alternating between Sudanese and English  HEENT: healing bruise noted on R forehead; MMM  Neck: supple  Cardiovascular: +S1/S2, RRR  Respiratory: CTA B/L; no W/R/R  Gastrointestinal: soft, NT/ND; +BSx4 -- no tenderness found on exam  Extremities: WWP; no edema, clubbing or cyanosis  Vascular: 2+ radial, DP/PT pulses B/L  Neurological: AAOx1 to self; depressed affect    MEDICATIONS:  MEDICATIONS  (STANDING):  apixaban 10 milliGRAM(s) Oral every 12 hours  atorvastatin 10 milliGRAM(s) Oral at bedtime  chlorhexidine 2% Cloths 1 Application(s) Topical <User Schedule>  donepezil 10 milliGRAM(s) Oral at bedtime  ertapenem  IVPB 500 milliGRAM(s) IV Intermittent every 24 hours  escitalopram 10 milliGRAM(s) Oral daily  ferrous    sulfate 325 milliGRAM(s) Oral daily  influenza   Vaccine 0.5 milliLiter(s) IntraMuscular once  lacosamide IVPB 150 milliGRAM(s) IV Intermittent every 12 hours  lactated ringers. 1000 milliLiter(s) (75 mL/Hr) IV Continuous <Continuous>  levETIRAcetam  IVPB 500 milliGRAM(s) IV Intermittent every 12 hours  memantine 5 milliGRAM(s) Oral daily  midodrine 10 milliGRAM(s) Oral every 8 hours  mirtazapine 15 milliGRAM(s) Oral at bedtime  pantoprazole  Injectable 40 milliGRAM(s) IV Push daily  risperiDONE   Tablet 0.25 milliGRAM(s) Oral two times a day    MEDICATIONS  (PRN):      ALLERGIES:  Allergies    No Known Allergies    Intolerances        LABS:                        7.2    6.51  )-----------( 267      ( 30 Apr 2019 05:44 )             24.0     04-30    143  |  112<H>  |  43<H>  ----------------------------<  99  4.1   |  15<L>  |  1.85<H>    Ca    9.4      30 Apr 2019 06:49  Phos  3.2     04-30  Mg     1.7     04-30      PTT - ( 29 Apr 2019 01:46 )  PTT:50.5 sec    CAPILLARY BLOOD GLUCOSE          RADIOLOGY & ADDITIONAL TESTS: Reviewed.

## 2019-04-30 NOTE — PROGRESS NOTE ADULT - PROBLEM SELECTOR PROBLEM 6
HTN (hypertension)
HTN (hypertension)
Functional quadriplegia
Functional quadriplegia
HTN (hypertension)
HTN (hypertension)

## 2019-04-30 NOTE — PROGRESS NOTE ADULT - SUBJECTIVE AND OBJECTIVE BOX
OVERNIGHT EVENTS:    SUBJECTIVE / INTERVAL HPI: Patient seen and examined at bedside.     VITAL SIGNS:  Vital Signs Last 24 Hrs  T(C): 36.7 (30 Apr 2019 05:06), Max: 37.1 (29 Apr 2019 22:00)  T(F): 98.1 (30 Apr 2019 05:06), Max: 98.8 (29 Apr 2019 22:00)  HR: 80 (30 Apr 2019 05:06) (58 - 100)  BP: 112/74 (30 Apr 2019 05:06) (100/57 - 148/83)  BP(mean): 81 (29 Apr 2019 16:00) (63 - 98)  RR: 18 (30 Apr 2019 05:06) (12 - 18)  SpO2: 98% (30 Apr 2019 05:06) (95% - 98%)    PHYSICAL EXAM:    General: WDWN  HEENT: NC/AT; PERRL, anicteric sclera; MMM  Neck: supple  Cardiovascular: +S1/S2, RRR  Respiratory: CTA B/L; no W/R/R  Gastrointestinal: soft, NT/ND; +BSx4  Extremities: WWP; no edema, clubbing or cyanosis  Vascular: 2+ radial, DP/PT pulses B/L  Neurological: AAOx3; no focal deficits    MEDICATIONS:  MEDICATIONS  (STANDING):  apixaban 10 milliGRAM(s) Oral every 12 hours  atorvastatin 10 milliGRAM(s) Oral at bedtime  chlorhexidine 2% Cloths 1 Application(s) Topical <User Schedule>  donepezil 10 milliGRAM(s) Oral at bedtime  ertapenem  IVPB 500 milliGRAM(s) IV Intermittent every 24 hours  escitalopram 10 milliGRAM(s) Oral daily  ferrous    sulfate 325 milliGRAM(s) Oral daily  influenza   Vaccine 0.5 milliLiter(s) IntraMuscular once  lacosamide IVPB 150 milliGRAM(s) IV Intermittent every 12 hours  lactated ringers. 1000 milliLiter(s) (75 mL/Hr) IV Continuous <Continuous>  levETIRAcetam  IVPB 500 milliGRAM(s) IV Intermittent every 12 hours  memantine 5 milliGRAM(s) Oral daily  midodrine 10 milliGRAM(s) Oral every 8 hours  mirtazapine 15 milliGRAM(s) Oral at bedtime  pantoprazole  Injectable 40 milliGRAM(s) IV Push daily  risperiDONE   Tablet 0.25 milliGRAM(s) Oral two times a day    MEDICATIONS  (PRN):      ALLERGIES:  Allergies    No Known Allergies    Intolerances        LABS:                        7.2    6.51  )-----------( 267      ( 30 Apr 2019 05:44 )             24.0     04-30    143  |  112<H>  |  43<H>  ----------------------------<  99  4.1   |  15<L>  |  1.85<H>    Ca    9.4      30 Apr 2019 06:49  Phos  3.2     04-30  Mg     1.7     04-30      PTT - ( 29 Apr 2019 01:46 )  PTT:50.5 sec    CAPILLARY BLOOD GLUCOSE          RADIOLOGY & ADDITIONAL TESTS: Reviewed. OVERNIGHT EVENTS: JERO    SUBJECTIVE / INTERVAL HPI: Patient seen and examined at bedside. some belly tenderness.     VITAL SIGNS:  Vital Signs Last 24 Hrs  T(C): 36.7 (30 Apr 2019 05:06), Max: 37.1 (29 Apr 2019 22:00)  T(F): 98.1 (30 Apr 2019 05:06), Max: 98.8 (29 Apr 2019 22:00)  HR: 80 (30 Apr 2019 05:06) (58 - 100)  BP: 112/74 (30 Apr 2019 05:06) (100/57 - 148/83)  BP(mean): 81 (29 Apr 2019 16:00) (63 - 98)  RR: 18 (30 Apr 2019 05:06) (12 - 18)  SpO2: 98% (30 Apr 2019 05:06) (95% - 98%)    PHYSICAL EXAM:  General: thin elderly female in NAD; lying in bed; alternating between Wallisian and English  HEENT: healing bruise noted on R forehead; MMM  Neck: supple  Cardiovascular: +S1/S2, RRR  Respiratory: CTA B/L; no W/R/R  Gastrointestinal: soft, NT/ND; +BSx4 -- no tenderness found on exam  Extremities: WWP; no edema, clubbing or cyanosis  Vascular: 2+ radial, DP/PT pulses B/L  Neurological: AAOx1 to self; depressed affect    MEDICATIONS:  MEDICATIONS  (STANDING):  apixaban 10 milliGRAM(s) Oral every 12 hours  atorvastatin 10 milliGRAM(s) Oral at bedtime  chlorhexidine 2% Cloths 1 Application(s) Topical <User Schedule>  donepezil 10 milliGRAM(s) Oral at bedtime  ertapenem  IVPB 500 milliGRAM(s) IV Intermittent every 24 hours  escitalopram 10 milliGRAM(s) Oral daily  ferrous    sulfate 325 milliGRAM(s) Oral daily  influenza   Vaccine 0.5 milliLiter(s) IntraMuscular once  lacosamide IVPB 150 milliGRAM(s) IV Intermittent every 12 hours  lactated ringers. 1000 milliLiter(s) (75 mL/Hr) IV Continuous <Continuous>  levETIRAcetam  IVPB 500 milliGRAM(s) IV Intermittent every 12 hours  memantine 5 milliGRAM(s) Oral daily  midodrine 10 milliGRAM(s) Oral every 8 hours  mirtazapine 15 milliGRAM(s) Oral at bedtime  pantoprazole  Injectable 40 milliGRAM(s) IV Push daily  risperiDONE   Tablet 0.25 milliGRAM(s) Oral two times a day    MEDICATIONS  (PRN):      ALLERGIES:  Allergies    No Known Allergies    Intolerances        LABS:                        7.2    6.51  )-----------( 267      ( 30 Apr 2019 05:44 )             24.0     04-30    143  |  112<H>  |  43<H>  ----------------------------<  99  4.1   |  15<L>  |  1.85<H>    Ca    9.4      30 Apr 2019 06:49  Phos  3.2     04-30  Mg     1.7     04-30      PTT - ( 29 Apr 2019 01:46 )  PTT:50.5 sec    CAPILLARY BLOOD GLUCOSE          RADIOLOGY & ADDITIONAL TESTS: Reviewed.

## 2019-04-30 NOTE — PROGRESS NOTE ADULT - PROBLEM SELECTOR PLAN 2
pt admitted w septic shock 2/2 ESBL E. coli. known hx of recurrent UTIs and nephrolithiasis  - c/w ertapenem 500mg IV daily x 14 days (4/26-5/9)  - f/u ID recs
pt admitted w septic shock 2/2 ESBL E. coli. known hx of recurrent UTIs and nephrolithiasis  - c/w ertapenem 500mg IV daily x 14 days (4/26-5/9)  - f/u ID recs
secondary to UTI secondary to multi-drug resistant E Coli.  Improving.  Plan for antibiotics until May 9  Apparently daughter willing to have patient go to [facility] for antibiotics and drug adjusted to both be acceptable to [facility] and to be effective in treating present infection
secondary to UTI secondary to multi-drug resistant E Coli.  Improving.  Plan for antibiotics until May 9  Considering placement of mid-line.  Discussions ongoing about how this can be managed at home.  Daughter will be in [place] until May 6  Aide is unable to give medication.  Efforts will be made by daughter to see if she can find a friend to learn how to administer meds
pt admitted w septic shock 2/2 ESBL E. coli. known hx of recurrent UTIs and nephrolithiasis  - c/w ertapenem 500mg IV daily x 14 days (4/26-5/9)  - f/u ID recs  - f/u w ID regarding meropenem as [facility] will not pay for ertapenem
pt admitted w septic shock 2/2 ESBL E. coli. known hx of recurrent UTIs and nephrolithiasis  - c/w ertapenem 500mg IV daily x 14 days (4/26-5/9)  - f/u ID recs  - f/u w ID regarding meropenem as [facility] will not pay for ertapenem

## 2019-04-30 NOTE — PROGRESS NOTE ADULT - PROBLEM SELECTOR PROBLEM 1
Deep vein thrombosis
Acute kidney injury
Acute kidney injury
Deep vein thrombosis

## 2019-04-30 NOTE — PROGRESS NOTE ADULT - PROBLEM SELECTOR PROBLEM 5
HLD (hyperlipidemia)
Depression, unspecified depression type
Depression, unspecified depression type
HLD (hyperlipidemia)

## 2019-04-30 NOTE — PROGRESS NOTE ADULT - PROBLEM SELECTOR PLAN 9
1) PCP Contacted on Admission: (Y/N) --> Name & Phone #: Dr. Jose Reina  2) Date of Contact with PCP:  3) PCP Contacted at Discharge: (Y/N)  4) Summary of Handoff Given to PCP:   5) Post-Discharge Appointment Date and Location:

## 2019-04-30 NOTE — PROGRESS NOTE ADULT - PROBLEM SELECTOR PLAN 3
ALE on CKD IIIa; pt admitted w Cr 6.44 (baseline: 1.23) likely due to septic shock 2/2 UTI; renal US w b/l renal cysts, medical renal disease and angiomyolipoma  - I/Os  - ctm BMP  - avoid nephrotoxic agents  - renally dose medications    #Normocytic Anemia   - hgb at baseline w recent (feb) iron studies showing AOCD. no s/s of bleeding at this time.    #Non-Anion Gap Metabolic Acidosis  Likely 2/2 RTA in the setting of acute on chronic kidney failure. kidney fx improving  - ctm BMPs
ALE on CKD IIIa; pt admitted w Cr 6.44 (baseline: 1.23) likely due to septic shock 2/2 UTI; renal US w b/l renal cysts, medical renal disease and angiomyolipoma  - I/Os  - ctm BMP  - avoid nephrotoxic agents  - renally dose medications    #Normocytic Anemia   - hgb at baseline w recent (feb) iron studies showing AOCD. no s/s of bleeding at this time.    #Non-Anion Gap Metabolic Acidosis  Likely 2/2 RTA in the setting of acute on chronic kidney failure. kidney fx improving  - ctm BMPs
on treatment, asymptomatic
on treatment, asymptomatic
ALE on CKD IIIa; pt admitted w Cr 6.44 (baseline: 1.23) likely due to septic shock 2/2 UTI; renal US w b/l renal cysts, medical renal disease and angiomyolipoma  - I/Os  - ctm BMP  - avoid nephrotoxic agents  - renally dose medications    #Normocytic Anemia   - hgb at baseline w recent (feb) iron studies showing AOCD. no s/s of bleeding at this time.    #Non-Anion Gap Metabolic Acidosis  Likely 2/2 RTA in the setting of acute on chronic kidney failure. kidney fx improving  - ctm BMPs
ALE on CKD IIIa; pt admitted w Cr 6.44 (baseline: 1.23) likely due to septic shock 2/2 UTI; renal US w b/l renal cysts, medical renal disease and angiomyolipoma  - I/Os  - ctm BMP  - avoid nephrotoxic agents  - renally dose medications    #Normocytic Anemia   - hgb at baseline w recent (feb) iron studies showing AOCD. no s/s of bleeding at this time.    #Non-Anion Gap Metabolic Acidosis  Likely 2/2 RTA in the setting of acute on chronic kidney failure. kidney fx improving  - ctm BMPs

## 2019-04-30 NOTE — PROGRESS NOTE ADULT - PROBLEM SELECTOR PROBLEM 2
UTI (urinary tract infection)
Sepsis
Sepsis
UTI (urinary tract infection)

## 2019-04-30 NOTE — PROGRESS NOTE BEHAVIORAL HEALTH - PROBLEM SELECTOR PLAN 1
Currently pt denies SI/plan or intent.   Continue current medication regiment  minimize restraints.
Currently pt denies SI/plan or intent.   Continue current medication regiment  minimize restraints.

## 2019-04-30 NOTE — PROGRESS NOTE ADULT - PROBLEM SELECTOR PROBLEM 3
Acute kidney injury
Acute kidney injury
Deep vein thrombosis
Deep vein thrombosis
Acute kidney injury
Acute kidney injury

## 2019-04-30 NOTE — PROGRESS NOTE BEHAVIORAL HEALTH - PROBLEM SELECTOR PLAN 2
pt with likely superimposed delirium  No agitation currently. Decrease Risperdal dose to 0.25 mg qhs with plan to d/c if no agitation.   Provide frequent orientation clues.

## 2019-04-30 NOTE — PROGRESS NOTE ADULT - ATTENDING COMMENTS
Patient seen and examined with house-staff during bedside rounds.  Resident note read, including vitals, physical findings, laboratory data, and radiological reports.   Revisions included below.  Direct personal management at bed side and extensive interpretation of the data.  Plan was outlined and discussed in details with the housestaff.  Decision making of high complexity  Action taken for acute disease activity to reflect the level of care provided:  - medication reconciliation  - review laboratory data   sepsis uti with dementia and seizure disorder.  patient is clinically stable. She responded to fluid resuscitation.  Continue antibiotic.
Patient seen and examined with house-staff during bedside rounds.  Resident note read, including vitals, physical findings, laboratory data, and radiological reports.   Revisions included below.  Direct personal management at bed side and extensive interpretation of the data.  Plan was outlined and discussed in details with the housestaff.  Decision making of high complexity  Action taken for acute disease activity to reflect the level of care provided:  - medication reconciliation  - review laboratory data  Septic shock secondary to UTI    Patient mental status is stable and she was evaluated by palliative medicine and psychiatry. Urine culture was positive and antibiotic for adjusted. Weaned off pressers.
Patient seen and examined with house-staff during bedside rounds.  Resident note read, including vitals, physical findings, laboratory data, and radiological reports.   Revisions included below.  Direct personal management at bed side and extensive interpretation of the data.  Plan was outlined and discussed in details with the housestaff.  Decision making of high complexity  Action taken for acute disease activity to reflect the level of care provided:  - medication reconciliation  - review laboratory data  DNR DNI  Off pressors  evaluated for RF
CV stable  DVT--eliquis  Diet--regular Dementia-stable  BP--on midodrine
Multi organ failure  Sepsis--cont AB  DVT--Eliquis load  Supp rx  to floor

## 2019-04-30 NOTE — PROGRESS NOTE ADULT - PROBLEM SELECTOR PLAN 4
pt w recent hx of depression w decreased PO intake x 2-3 months and suicidal ideation on this hospitalization  - c/w remeron 15mg qhs, lexapro 10mg and risperidone 0.25 BID per psych  - psych on board; appreciate recs
no seizures noted- likely related to progressive brain disease- on anti-epileptics chronically
no seizures noted- likely related to progressive brain disease- on anti-epileptics chronically
pt w recent hx of depression w decreased PO intake x 2-3 months and suicidal ideation on this hospitalization  - c/w remeron 15mg qhs, lexapro 10mg and risperidone 0.25 BID per psych  - psych on board; appreciate recs

## 2019-04-30 NOTE — PROGRESS NOTE ADULT - REASON FOR ADMISSION
hypotension

## 2019-04-30 NOTE — DISCHARGE NOTE PROVIDER - NSDCCPCAREPLAN_GEN_ALL_CORE_FT
PRINCIPAL DISCHARGE DIAGNOSIS  Diagnosis: UTI (urinary tract infection)  Assessment and Plan of Treatment: You were admitted with a severe urinary tract infection. You will require additional antibiotic      SECONDARY DISCHARGE DIAGNOSES  Diagnosis: Hypotension  Assessment and Plan of Treatment:     Diagnosis: Renal failure  Assessment and Plan of Treatment:     Diagnosis: UTI (urinary tract infection)  Assessment and Plan of Treatment: PRINCIPAL DISCHARGE DIAGNOSIS  Diagnosis: UTI (urinary tract infection)  Assessment and Plan of Treatment: You were admitted with a severe urinary tract infection. You will require additional antibiotics until 5/9 through your IV. Please return to your doctor if you have fevers, chills or are feeling ill.      SECONDARY DISCHARGE DIAGNOSES  Diagnosis: Hypotension  Assessment and Plan of Treatment: While you were in the hospital with your infection, you had very low blood pressure. Please continue midodrine which is a medication that helps increased your blood pressure. Please do not continue your high blood pressure medications.    Diagnosis: Depression, unspecified depression type  Assessment and Plan of Treatment: While you were in the hospital, you said you were having thoughts of suicide. We started an additional antidepressant. Please continue this medication (risperidone)    Diagnosis: Renal failure  Assessment and Plan of Treatment: When you had your infection, your kidneys were also affected. However, as the infection improved, so did your kidney function. Please follow up with your primary care doctor to recheck your labs.

## 2019-04-30 NOTE — PROGRESS NOTE ADULT - PROBLEM SELECTOR PLAN 7
- c/w donepezil 10mg qhs and memantine 5mg daily    #Seizure  - hx of seizure in January 2019  - c/w keppra 500mg BID and lacosamide 150mg BID (renally dosed)
Support provided to patient and family. Patient to have access to supportive services during rest of hospital stay as the patient/family deemed necessary ie. Chaplaincy, Massage therapy, Music therapy, Patient and family supportive services, Palliative SW, etc.  As discussed during the palliative IDT meeting and as identified during the patients PSSA screening the patient would benefit from: further discussion with Pall Med SW
Support provided to patient and family. Patient to have access to supportive services during rest of hospital stay as the patient/family deemed necessary ie. Chaplaincy, Massage therapy, Music therapy, Patient and family supportive services, Palliative SW, etc.  As discussed during the palliative IDT meeting and as identified during the patients PSSA screening the patient would benefit from: further discussion with Pall Med SW
- c/w donepezil 10mg qhs and memantine 5mg daily    #Seizure  - hx of seizure in January 2019  - c/w keppra 500mg BID and lacosamide 150mg BID (renally dosed)

## 2019-04-30 NOTE — PROGRESS NOTE ADULT - PROBLEM SELECTOR PLAN 6
- holding home lopressor 25 and lisinopril 5 in setting of septic shock  - c/w midodrine 10mg q8
Unable to manage any of her own ADLs.  Has not been evaluated by PT as yet, and has not been OOB
Unable to manage any of her own ADLs.  Has not been evaluated by PT as yet, and has not been OOB
- holding home lopressor 25 and lisinopril 5 in setting of septic shock  - c/w midodrine 10mg q8

## 2019-04-30 NOTE — PROGRESS NOTE BEHAVIORAL HEALTH - SUMMARY
Pt with likely delirium in setting of underlying dementia, depression, and ongoing infection. Currently pt denies SI. Recommend decreasing Risperdal to 0.25 mg qhs with plan to discontinue if pt is not agitated.   Continue other medications as per prior notes.

## 2019-04-30 NOTE — PROGRESS NOTE ADULT - PROBLEM SELECTOR PLAN 8
F: LR @ 75 cc/hr  E: replete PRN - cautious given CKD IIIa  N: Regular diet w Ensure BID    DVT ppx: on eliquis    DNR/DNI    Dispo: EDD

## 2019-04-30 NOTE — PROGRESS NOTE ADULT - PROBLEM SELECTOR PLAN 5
c/w atorvastatin 10mg qhs    #CAD  - s/p stents >10 years ago; Echo: LV wall motion normal, EF 55-60%, normal LA/RA, calcified aortic valve minimal AS, trace TR, no pHTN.  - c/w atorvastatin 10 mg
Patient carries this diagnosis.  Unclear if requests to die over weekend were related, or secondary to delirium.  Patient indicated to me today that she felt like she was "crazy."  Started on Remeron.  Will discuss further with Dr. Horne
Patient carries this diagnosis.  Unclear if requests to die over weekend were related, or secondary to delirium.  Patient indicated to me today that she felt like she was "crazy."  Started on Risperidone .  Will discuss further with Dr. Morrison who now recommends decrease in dose
c/w atorvastatin 10mg qhs    #CAD  - s/p stents >10 years ago; Echo: LV wall motion normal, EF 55-60%, normal LA/RA, calcified aortic valve minimal AS, trace TR, no pHTN.  - c/w atorvastatin 10 mg

## 2019-05-01 ENCOUNTER — TRANSCRIPTION ENCOUNTER (OUTPATIENT)
Age: 74
End: 2019-05-01

## 2019-05-01 VITALS
HEART RATE: 88 BPM | DIASTOLIC BLOOD PRESSURE: 78 MMHG | TEMPERATURE: 98 F | OXYGEN SATURATION: 99 % | RESPIRATION RATE: 18 BRPM | SYSTOLIC BLOOD PRESSURE: 133 MMHG

## 2019-05-01 PROCEDURE — 82330 ASSAY OF CALCIUM: CPT

## 2019-05-01 PROCEDURE — 87186 SC STD MICRODIL/AGAR DIL: CPT

## 2019-05-01 PROCEDURE — 93005 ELECTROCARDIOGRAM TRACING: CPT

## 2019-05-01 PROCEDURE — C9254: CPT

## 2019-05-01 PROCEDURE — 51702 INSERT TEMP BLADDER CATH: CPT

## 2019-05-01 PROCEDURE — 84132 ASSAY OF SERUM POTASSIUM: CPT

## 2019-05-01 PROCEDURE — 93970 EXTREMITY STUDY: CPT

## 2019-05-01 PROCEDURE — 71045 X-RAY EXAM CHEST 1 VIEW: CPT

## 2019-05-01 PROCEDURE — 87040 BLOOD CULTURE FOR BACTERIA: CPT

## 2019-05-01 PROCEDURE — 83735 ASSAY OF MAGNESIUM: CPT

## 2019-05-01 PROCEDURE — 86900 BLOOD TYPING SEROLOGIC ABO: CPT

## 2019-05-01 PROCEDURE — 96365 THER/PROPH/DIAG IV INF INIT: CPT | Mod: XU

## 2019-05-01 PROCEDURE — 82553 CREATINE MB FRACTION: CPT

## 2019-05-01 PROCEDURE — 80053 COMPREHEN METABOLIC PANEL: CPT

## 2019-05-01 PROCEDURE — 84100 ASSAY OF PHOSPHORUS: CPT

## 2019-05-01 PROCEDURE — 85027 COMPLETE CBC AUTOMATED: CPT

## 2019-05-01 PROCEDURE — 94640 AIRWAY INHALATION TREATMENT: CPT

## 2019-05-01 PROCEDURE — 84295 ASSAY OF SERUM SODIUM: CPT

## 2019-05-01 PROCEDURE — 84300 ASSAY OF URINE SODIUM: CPT

## 2019-05-01 PROCEDURE — 84484 ASSAY OF TROPONIN QUANT: CPT

## 2019-05-01 PROCEDURE — 36415 COLL VENOUS BLD VENIPUNCTURE: CPT

## 2019-05-01 PROCEDURE — 99285 EMERGENCY DEPT VISIT HI MDM: CPT | Mod: 25

## 2019-05-01 PROCEDURE — 86901 BLOOD TYPING SEROLOGIC RH(D): CPT

## 2019-05-01 PROCEDURE — P9045: CPT

## 2019-05-01 PROCEDURE — 93306 TTE W/DOPPLER COMPLETE: CPT

## 2019-05-01 PROCEDURE — 82550 ASSAY OF CK (CPK): CPT

## 2019-05-01 PROCEDURE — 82570 ASSAY OF URINE CREATININE: CPT

## 2019-05-01 PROCEDURE — 76775 US EXAM ABDO BACK WALL LIM: CPT

## 2019-05-01 PROCEDURE — 85025 COMPLETE CBC W/AUTO DIFF WBC: CPT

## 2019-05-01 PROCEDURE — 84443 ASSAY THYROID STIM HORMONE: CPT

## 2019-05-01 PROCEDURE — 87086 URINE CULTURE/COLONY COUNT: CPT

## 2019-05-01 PROCEDURE — 86850 RBC ANTIBODY SCREEN: CPT

## 2019-05-01 PROCEDURE — 85730 THROMBOPLASTIN TIME PARTIAL: CPT

## 2019-05-01 PROCEDURE — 96361 HYDRATE IV INFUSION ADD-ON: CPT

## 2019-05-01 PROCEDURE — 96375 TX/PRO/DX INJ NEW DRUG ADDON: CPT | Mod: XU

## 2019-05-01 PROCEDURE — 80177 DRUG SCRN QUAN LEVETIRACETAM: CPT

## 2019-05-01 PROCEDURE — 84439 ASSAY OF FREE THYROXINE: CPT

## 2019-05-01 PROCEDURE — 80048 BASIC METABOLIC PNL TOTAL CA: CPT

## 2019-05-01 PROCEDURE — 81001 URINALYSIS AUTO W/SCOPE: CPT

## 2019-05-01 PROCEDURE — 86803 HEPATITIS C AB TEST: CPT

## 2019-05-01 PROCEDURE — 85610 PROTHROMBIN TIME: CPT

## 2019-05-01 PROCEDURE — 82962 GLUCOSE BLOOD TEST: CPT

## 2019-05-01 PROCEDURE — 82803 BLOOD GASES ANY COMBINATION: CPT

## 2019-05-01 RX ORDER — MEROPENEM 1 G/30ML
500 INJECTION INTRAVENOUS
Qty: 0 | Refills: 0 | DISCHARGE
Start: 2019-05-01 | End: 2019-05-09

## 2019-05-01 RX ORDER — METOPROLOL TARTRATE 50 MG
1 TABLET ORAL
Qty: 0 | Refills: 0 | COMMUNITY

## 2019-05-01 RX ADMIN — APIXABAN 10 MILLIGRAM(S): 2.5 TABLET, FILM COATED ORAL at 12:01

## 2019-05-01 RX ADMIN — DONEPEZIL HYDROCHLORIDE 10 MILLIGRAM(S): 10 TABLET, FILM COATED ORAL at 00:27

## 2019-05-01 RX ADMIN — RISPERIDONE 0.25 MILLIGRAM(S): 4 TABLET ORAL at 00:27

## 2019-05-01 RX ADMIN — ATORVASTATIN CALCIUM 10 MILLIGRAM(S): 80 TABLET, FILM COATED ORAL at 00:27

## 2019-05-01 RX ADMIN — MIDODRINE HYDROCHLORIDE 10 MILLIGRAM(S): 2.5 TABLET ORAL at 01:41

## 2019-05-01 RX ADMIN — CHLORHEXIDINE GLUCONATE 1 APPLICATION(S): 213 SOLUTION TOPICAL at 06:30

## 2019-05-01 RX ADMIN — APIXABAN 10 MILLIGRAM(S): 2.5 TABLET, FILM COATED ORAL at 00:28

## 2019-05-01 RX ADMIN — MEROPENEM 100 MILLIGRAM(S): 1 INJECTION INTRAVENOUS at 00:28

## 2019-05-01 RX ADMIN — MIRTAZAPINE 15 MILLIGRAM(S): 45 TABLET, ORALLY DISINTEGRATING ORAL at 00:28

## 2019-05-01 RX ADMIN — MEROPENEM 100 MILLIGRAM(S): 1 INJECTION INTRAVENOUS at 12:01

## 2019-05-01 NOTE — DISCHARGE NOTE NURSING/CASE MANAGEMENT/SOCIAL WORK - NSDCDPATPORTLINK_GEN_ALL_CORE
You can access the [facility] Patient Portal, offered by [facility], by registering with the following website: [email or web address]

## 2019-05-03 DIAGNOSIS — R41.9 UNSPECIFIED SYMPTOMS AND SIGNS INVOLVING COGNITIVE FUNCTIONS AND AWARENESS: ICD-10-CM

## 2019-05-03 DIAGNOSIS — I48.91 UNSPECIFIED ATRIAL FIBRILLATION: ICD-10-CM

## 2019-05-03 DIAGNOSIS — I73.9 PERIPHERAL VASCULAR DISEASE, UNSPECIFIED: ICD-10-CM

## 2019-05-03 DIAGNOSIS — D17.9 BENIGN LIPOMATOUS NEOPLASM, UNSPECIFIED: ICD-10-CM

## 2019-05-03 DIAGNOSIS — I25.10 ATHEROSCLEROTIC HEART DISEASE OF NATIVE CORONARY ARTERY WITHOUT ANGINA PECTORIS: ICD-10-CM

## 2019-05-03 DIAGNOSIS — N17.9 ACUTE KIDNEY FAILURE, UNSPECIFIED: ICD-10-CM

## 2019-05-03 DIAGNOSIS — F32.9 MAJOR DEPRESSIVE DISORDER, SINGLE EPISODE, UNSPECIFIED: ICD-10-CM

## 2019-05-03 DIAGNOSIS — R53.1 WEAKNESS: ICD-10-CM

## 2019-05-03 DIAGNOSIS — E44.0 MODERATE PROTEIN-CALORIE MALNUTRITION: ICD-10-CM

## 2019-05-03 DIAGNOSIS — R53.2 FUNCTIONAL QUADRIPLEGIA: ICD-10-CM

## 2019-05-03 DIAGNOSIS — D64.9 ANEMIA, UNSPECIFIED: ICD-10-CM

## 2019-05-03 DIAGNOSIS — I12.9 HYPERTENSIVE CHRONIC KIDNEY DISEASE WITH STAGE 1 THROUGH STAGE 4 CHRONIC KIDNEY DISEASE, OR UNSPECIFIED CHRONIC KIDNEY DISEASE: ICD-10-CM

## 2019-05-03 DIAGNOSIS — N18.3 CHRONIC KIDNEY DISEASE, STAGE 3 (MODERATE): ICD-10-CM

## 2019-05-03 DIAGNOSIS — B96.20 UNSPECIFIED ESCHERICHIA COLI [E. COLI] AS THE CAUSE OF DISEASES CLASSIFIED ELSEWHERE: ICD-10-CM

## 2019-05-03 DIAGNOSIS — Z78.1 PHYSICAL RESTRAINT STATUS: ICD-10-CM

## 2019-05-03 DIAGNOSIS — Z60.4 SOCIAL EXCLUSION AND REJECTION: ICD-10-CM

## 2019-05-03 DIAGNOSIS — E78.5 HYPERLIPIDEMIA, UNSPECIFIED: ICD-10-CM

## 2019-05-03 DIAGNOSIS — I82.403 ACUTE EMBOLISM AND THROMBOSIS OF UNSPECIFIED DEEP VEINS OF LOWER EXTREMITY, BILATERAL: ICD-10-CM

## 2019-05-03 DIAGNOSIS — K21.9 GASTRO-ESOPHAGEAL REFLUX DISEASE WITHOUT ESOPHAGITIS: ICD-10-CM

## 2019-05-03 DIAGNOSIS — G40.909 EPILEPSY, UNSPECIFIED, NOT INTRACTABLE, WITHOUT STATUS EPILEPTICUS: ICD-10-CM

## 2019-05-03 DIAGNOSIS — F03.90 UNSPECIFIED DEMENTIA WITHOUT BEHAVIORAL DISTURBANCE: ICD-10-CM

## 2019-05-03 DIAGNOSIS — R65.21 SEVERE SEPSIS WITH SEPTIC SHOCK: ICD-10-CM

## 2019-05-03 DIAGNOSIS — A41.9 SEPSIS, UNSPECIFIED ORGANISM: ICD-10-CM

## 2019-05-03 DIAGNOSIS — N39.0 URINARY TRACT INFECTION, SITE NOT SPECIFIED: ICD-10-CM

## 2019-05-03 DIAGNOSIS — I08.2 RHEUMATIC DISORDERS OF BOTH AORTIC AND TRICUSPID VALVES: ICD-10-CM

## 2019-05-03 DIAGNOSIS — R57.1 HYPOVOLEMIC SHOCK: ICD-10-CM

## 2019-05-03 DIAGNOSIS — Z51.5 ENCOUNTER FOR PALLIATIVE CARE: ICD-10-CM

## 2019-05-03 DIAGNOSIS — E87.5 HYPERKALEMIA: ICD-10-CM

## 2019-05-03 DIAGNOSIS — N28.1 CYST OF KIDNEY, ACQUIRED: ICD-10-CM

## 2019-05-03 LAB
CULTURE RESULTS: SIGNIFICANT CHANGE UP
SPECIMEN SOURCE: SIGNIFICANT CHANGE UP

## 2019-05-03 SDOH — SOCIAL STABILITY - SOCIAL INSECURITY: SOCIAL EXCLUSION AND REJECTION: Z60.4

## 2019-05-09 ENCOUNTER — INPATIENT (INPATIENT)
Facility: HOSPITAL | Age: 74
LOS: 1 days | Discharge: HOME HEALTH | DRG: 812 | End: 2019-05-11
Attending: INTERNAL MEDICINE | Admitting: INTERNAL MEDICINE
Payer: MEDICARE

## 2019-05-09 VITALS
TEMPERATURE: 98 F | RESPIRATION RATE: 18 BRPM | DIASTOLIC BLOOD PRESSURE: 73 MMHG | WEIGHT: 133.6 LBS | SYSTOLIC BLOOD PRESSURE: 111 MMHG | OXYGEN SATURATION: 97 % | HEART RATE: 68 BPM

## 2019-05-09 DIAGNOSIS — Z98.890 OTHER SPECIFIED POSTPROCEDURAL STATES: Chronic | ICD-10-CM

## 2019-05-09 DIAGNOSIS — Z90.3 ACQUIRED ABSENCE OF STOMACH [PART OF]: Chronic | ICD-10-CM

## 2019-05-09 DIAGNOSIS — R63.8 OTHER SYMPTOMS AND SIGNS CONCERNING FOOD AND FLUID INTAKE: ICD-10-CM

## 2019-05-09 DIAGNOSIS — E11.9 TYPE 2 DIABETES MELLITUS WITHOUT COMPLICATIONS: ICD-10-CM

## 2019-05-09 DIAGNOSIS — Z86.79 PERSONAL HISTORY OF OTHER DISEASES OF THE CIRCULATORY SYSTEM: ICD-10-CM

## 2019-05-09 DIAGNOSIS — K21.9 GASTRO-ESOPHAGEAL REFLUX DISEASE WITHOUT ESOPHAGITIS: ICD-10-CM

## 2019-05-09 DIAGNOSIS — I48.91 UNSPECIFIED ATRIAL FIBRILLATION: ICD-10-CM

## 2019-05-09 DIAGNOSIS — D64.9 ANEMIA, UNSPECIFIED: ICD-10-CM

## 2019-05-09 DIAGNOSIS — F03.90 UNSPECIFIED DEMENTIA WITHOUT BEHAVIORAL DISTURBANCE: ICD-10-CM

## 2019-05-09 DIAGNOSIS — F32.9 MAJOR DEPRESSIVE DISORDER, SINGLE EPISODE, UNSPECIFIED: ICD-10-CM

## 2019-05-09 DIAGNOSIS — E78.5 HYPERLIPIDEMIA, UNSPECIFIED: ICD-10-CM

## 2019-05-09 DIAGNOSIS — Z91.89 OTHER SPECIFIED PERSONAL RISK FACTORS, NOT ELSEWHERE CLASSIFIED: ICD-10-CM

## 2019-05-09 LAB
ALBUMIN SERPL ELPH-MCNC: 2.3 G/DL — LOW (ref 3.3–5)
ALP SERPL-CCNC: 101 U/L — SIGNIFICANT CHANGE UP (ref 40–120)
ALT FLD-CCNC: 5 U/L — LOW (ref 10–45)
ANION GAP SERPL CALC-SCNC: 11 MMOL/L — SIGNIFICANT CHANGE UP (ref 5–17)
APTT BLD: 46 SEC — HIGH (ref 27.5–36.3)
AST SERPL-CCNC: 17 U/L — SIGNIFICANT CHANGE UP (ref 10–40)
BASOPHILS # BLD AUTO: 0 K/UL — SIGNIFICANT CHANGE UP (ref 0–0.2)
BASOPHILS NFR BLD AUTO: 0 % — SIGNIFICANT CHANGE UP (ref 0–2)
BILIRUB SERPL-MCNC: 0.2 MG/DL — SIGNIFICANT CHANGE UP (ref 0.2–1.2)
BUN SERPL-MCNC: 21 MG/DL — SIGNIFICANT CHANGE UP (ref 7–23)
CALCIUM SERPL-MCNC: 9.5 MG/DL — SIGNIFICANT CHANGE UP (ref 8.4–10.5)
CHLORIDE SERPL-SCNC: 114 MMOL/L — HIGH (ref 96–108)
CO2 SERPL-SCNC: 20 MMOL/L — LOW (ref 22–31)
CREAT SERPL-MCNC: 1.28 MG/DL — SIGNIFICANT CHANGE UP (ref 0.5–1.3)
EOSINOPHIL # BLD AUTO: 0.63 K/UL — HIGH (ref 0–0.5)
EOSINOPHIL NFR BLD AUTO: 8.7 % — HIGH (ref 0–6)
GLUCOSE SERPL-MCNC: 88 MG/DL — SIGNIFICANT CHANGE UP (ref 70–99)
HCT VFR BLD CALC: 21.5 % — LOW (ref 34.5–45)
HCT VFR BLD CALC: 27 % — LOW (ref 34.5–45)
HGB BLD-MCNC: 6.5 G/DL — CRITICAL LOW (ref 11.5–15.5)
HGB BLD-MCNC: 8.3 G/DL — LOW (ref 11.5–15.5)
LYMPHOCYTES # BLD AUTO: 0.82 K/UL — LOW (ref 1–3.3)
LYMPHOCYTES # BLD AUTO: 11.3 % — LOW (ref 13–44)
MCHC RBC-ENTMCNC: 29 PG — SIGNIFICANT CHANGE UP (ref 27–34)
MCHC RBC-ENTMCNC: 29.3 PG — SIGNIFICANT CHANGE UP (ref 27–34)
MCHC RBC-ENTMCNC: 30.2 GM/DL — LOW (ref 32–36)
MCHC RBC-ENTMCNC: 30.7 GM/DL — LOW (ref 32–36)
MCV RBC AUTO: 95.4 FL — SIGNIFICANT CHANGE UP (ref 80–100)
MCV RBC AUTO: 96 FL — SIGNIFICANT CHANGE UP (ref 80–100)
MONOCYTES # BLD AUTO: 0 K/UL — SIGNIFICANT CHANGE UP (ref 0–0.9)
MONOCYTES NFR BLD AUTO: 0 % — LOW (ref 2–14)
NEUTROPHILS # BLD AUTO: 5.69 K/UL — SIGNIFICANT CHANGE UP (ref 1.8–7.4)
NEUTROPHILS NFR BLD AUTO: 78.2 % — HIGH (ref 43–77)
NRBC # BLD: 0 /100 WBCS — SIGNIFICANT CHANGE UP (ref 0–0)
OB PNL STL: NEGATIVE — SIGNIFICANT CHANGE UP
PLATELET # BLD AUTO: 347 K/UL — SIGNIFICANT CHANGE UP (ref 150–400)
PLATELET # BLD AUTO: 380 K/UL — SIGNIFICANT CHANGE UP (ref 150–400)
POTASSIUM SERPL-MCNC: 3.9 MMOL/L — SIGNIFICANT CHANGE UP (ref 3.5–5.3)
POTASSIUM SERPL-SCNC: 3.9 MMOL/L — SIGNIFICANT CHANGE UP (ref 3.5–5.3)
PROT SERPL-MCNC: 5.4 G/DL — LOW (ref 6–8.3)
RBC # BLD: 2.24 M/UL — LOW (ref 3.8–5.2)
RBC # BLD: 2.83 M/UL — LOW (ref 3.8–5.2)
RBC # FLD: 18.6 % — HIGH (ref 10.3–14.5)
RBC # FLD: 20.5 % — HIGH (ref 10.3–14.5)
SODIUM SERPL-SCNC: 145 MMOL/L — SIGNIFICANT CHANGE UP (ref 135–145)
WBC # BLD: 7.27 K/UL — SIGNIFICANT CHANGE UP (ref 3.8–10.5)
WBC # BLD: 7.8 K/UL — SIGNIFICANT CHANGE UP (ref 3.8–10.5)
WBC # FLD AUTO: 7.27 K/UL — SIGNIFICANT CHANGE UP (ref 3.8–10.5)
WBC # FLD AUTO: 7.8 K/UL — SIGNIFICANT CHANGE UP (ref 3.8–10.5)

## 2019-05-09 PROCEDURE — 99285 EMERGENCY DEPT VISIT HI MDM: CPT | Mod: 25

## 2019-05-09 PROCEDURE — 93010 ELECTROCARDIOGRAM REPORT: CPT

## 2019-05-09 RX ORDER — ALLOPURINOL 300 MG
1 TABLET ORAL
Qty: 0 | Refills: 0 | DISCHARGE

## 2019-05-09 RX ORDER — MEMANTINE HYDROCHLORIDE 10 MG/1
10 TABLET ORAL DAILY
Refills: 0 | Status: DISCONTINUED | OUTPATIENT
Start: 2019-05-09 | End: 2019-05-11

## 2019-05-09 RX ORDER — RISPERIDONE 4 MG/1
0.25 TABLET ORAL AT BEDTIME
Refills: 0 | Status: DISCONTINUED | OUTPATIENT
Start: 2019-05-09 | End: 2019-05-11

## 2019-05-09 RX ORDER — ATORVASTATIN CALCIUM 80 MG/1
1 TABLET, FILM COATED ORAL
Qty: 0 | Refills: 0 | DISCHARGE

## 2019-05-09 RX ORDER — BACLOFEN 100 %
10 POWDER (GRAM) MISCELLANEOUS THREE TIMES A DAY
Refills: 0 | Status: DISCONTINUED | OUTPATIENT
Start: 2019-05-09 | End: 2019-05-11

## 2019-05-09 RX ORDER — MIRTAZAPINE 45 MG/1
15 TABLET, ORALLY DISINTEGRATING ORAL AT BEDTIME
Refills: 0 | Status: DISCONTINUED | OUTPATIENT
Start: 2019-05-09 | End: 2019-05-11

## 2019-05-09 RX ORDER — LEVETIRACETAM 250 MG/1
500 TABLET, FILM COATED ORAL
Refills: 0 | Status: DISCONTINUED | OUTPATIENT
Start: 2019-05-09 | End: 2019-05-11

## 2019-05-09 RX ORDER — IRON SUCROSE 20 MG/ML
100 INJECTION, SOLUTION INTRAVENOUS ONCE
Refills: 0 | Status: COMPLETED | OUTPATIENT
Start: 2019-05-09 | End: 2019-05-09

## 2019-05-09 RX ORDER — MEROPENEM 1 G/30ML
500 INJECTION INTRAVENOUS ONCE
Refills: 0 | Status: COMPLETED | OUTPATIENT
Start: 2019-05-09 | End: 2019-05-09

## 2019-05-09 RX ORDER — MEROPENEM 1 G/30ML
INJECTION INTRAVENOUS
Refills: 0 | Status: DISCONTINUED | OUTPATIENT
Start: 2019-05-09 | End: 2019-05-09

## 2019-05-09 RX ORDER — DONEPEZIL HYDROCHLORIDE 10 MG/1
2 TABLET, FILM COATED ORAL
Qty: 0 | Refills: 0 | DISCHARGE

## 2019-05-09 RX ORDER — ALLOPURINOL 300 MG
100 TABLET ORAL DAILY
Refills: 0 | Status: DISCONTINUED | OUTPATIENT
Start: 2019-05-09 | End: 2019-05-11

## 2019-05-09 RX ORDER — DONEPEZIL HYDROCHLORIDE 10 MG/1
10 TABLET, FILM COATED ORAL AT BEDTIME
Refills: 0 | Status: DISCONTINUED | OUTPATIENT
Start: 2019-05-09 | End: 2019-05-11

## 2019-05-09 RX ORDER — ESCITALOPRAM OXALATE 10 MG/1
10 TABLET, FILM COATED ORAL DAILY
Refills: 0 | Status: DISCONTINUED | OUTPATIENT
Start: 2019-05-09 | End: 2019-05-11

## 2019-05-09 RX ORDER — ALPRAZOLAM 0.25 MG
0.25 TABLET ORAL
Refills: 0 | Status: DISCONTINUED | OUTPATIENT
Start: 2019-05-09 | End: 2019-05-11

## 2019-05-09 RX ORDER — ASCORBIC ACID 60 MG
500 TABLET,CHEWABLE ORAL DAILY
Refills: 0 | Status: DISCONTINUED | OUTPATIENT
Start: 2019-05-09 | End: 2019-05-11

## 2019-05-09 RX ORDER — SODIUM CHLORIDE 9 MG/ML
500 INJECTION INTRAMUSCULAR; INTRAVENOUS; SUBCUTANEOUS ONCE
Refills: 0 | Status: COMPLETED | OUTPATIENT
Start: 2019-05-09 | End: 2019-05-09

## 2019-05-09 RX ORDER — POTASSIUM CHLORIDE 20 MEQ
20 PACKET (EA) ORAL ONCE
Refills: 0 | Status: COMPLETED | OUTPATIENT
Start: 2019-05-09 | End: 2019-05-09

## 2019-05-09 RX ORDER — APIXABAN 2.5 MG/1
5 TABLET, FILM COATED ORAL EVERY 12 HOURS
Refills: 0 | Status: DISCONTINUED | OUTPATIENT
Start: 2019-05-09 | End: 2019-05-11

## 2019-05-09 RX ORDER — LEVETIRACETAM 250 MG/1
1 TABLET, FILM COATED ORAL
Qty: 0 | Refills: 0 | DISCHARGE

## 2019-05-09 RX ORDER — ATORVASTATIN CALCIUM 80 MG/1
10 TABLET, FILM COATED ORAL AT BEDTIME
Refills: 0 | Status: DISCONTINUED | OUTPATIENT
Start: 2019-05-09 | End: 2019-05-11

## 2019-05-09 RX ORDER — LACOSAMIDE 50 MG/1
200 TABLET ORAL
Refills: 0 | Status: DISCONTINUED | OUTPATIENT
Start: 2019-05-09 | End: 2019-05-11

## 2019-05-09 RX ORDER — MIDODRINE HYDROCHLORIDE 2.5 MG/1
10 TABLET ORAL EVERY 8 HOURS
Refills: 0 | Status: DISCONTINUED | OUTPATIENT
Start: 2019-05-09 | End: 2019-05-11

## 2019-05-09 RX ORDER — MEROPENEM 1 G/30ML
500 INJECTION INTRAVENOUS ONCE
Refills: 0 | Status: COMPLETED | OUTPATIENT
Start: 2019-05-10 | End: 2019-05-10

## 2019-05-09 RX ORDER — ASCORBIC ACID 60 MG
1 TABLET,CHEWABLE ORAL
Qty: 0 | Refills: 0 | DISCHARGE

## 2019-05-09 RX ORDER — MEMANTINE HYDROCHLORIDE 10 MG/1
1 TABLET ORAL
Qty: 0 | Refills: 0 | DISCHARGE

## 2019-05-09 RX ORDER — FERROUS SULFATE 325(65) MG
325 TABLET ORAL DAILY
Refills: 0 | Status: DISCONTINUED | OUTPATIENT
Start: 2019-05-09 | End: 2019-05-11

## 2019-05-09 RX ORDER — PANTOPRAZOLE SODIUM 20 MG/1
40 TABLET, DELAYED RELEASE ORAL
Refills: 0 | Status: DISCONTINUED | OUTPATIENT
Start: 2019-05-09 | End: 2019-05-11

## 2019-05-09 RX ADMIN — Medication 20 MILLIEQUIVALENT(S): at 18:08

## 2019-05-09 RX ADMIN — APIXABAN 5 MILLIGRAM(S): 2.5 TABLET, FILM COATED ORAL at 18:08

## 2019-05-09 RX ADMIN — SODIUM CHLORIDE 500 MILLILITER(S): 9 INJECTION INTRAMUSCULAR; INTRAVENOUS; SUBCUTANEOUS at 13:33

## 2019-05-09 RX ADMIN — LEVETIRACETAM 500 MILLIGRAM(S): 250 TABLET, FILM COATED ORAL at 18:08

## 2019-05-09 RX ADMIN — IRON SUCROSE 210 MILLIGRAM(S): 20 INJECTION, SOLUTION INTRAVENOUS at 22:21

## 2019-05-09 RX ADMIN — Medication 325 MILLIGRAM(S): at 18:08

## 2019-05-09 RX ADMIN — MIDODRINE HYDROCHLORIDE 10 MILLIGRAM(S): 2.5 TABLET ORAL at 22:14

## 2019-05-09 RX ADMIN — DONEPEZIL HYDROCHLORIDE 10 MILLIGRAM(S): 10 TABLET, FILM COATED ORAL at 22:14

## 2019-05-09 RX ADMIN — Medication 10 MILLIGRAM(S): at 22:14

## 2019-05-09 RX ADMIN — ATORVASTATIN CALCIUM 10 MILLIGRAM(S): 80 TABLET, FILM COATED ORAL at 22:14

## 2019-05-09 RX ADMIN — MEMANTINE HYDROCHLORIDE 10 MILLIGRAM(S): 10 TABLET ORAL at 22:14

## 2019-05-09 RX ADMIN — Medication 500 MILLIGRAM(S): at 18:08

## 2019-05-09 RX ADMIN — LACOSAMIDE 200 MILLIGRAM(S): 50 TABLET ORAL at 18:08

## 2019-05-09 RX ADMIN — MIRTAZAPINE 15 MILLIGRAM(S): 45 TABLET, ORALLY DISINTEGRATING ORAL at 22:14

## 2019-05-09 RX ADMIN — MEROPENEM 100 MILLIGRAM(S): 1 INJECTION INTRAVENOUS at 13:31

## 2019-05-09 RX ADMIN — ESCITALOPRAM OXALATE 10 MILLIGRAM(S): 10 TABLET, FILM COATED ORAL at 18:08

## 2019-05-09 RX ADMIN — Medication 100 MILLIGRAM(S): at 18:09

## 2019-05-09 NOTE — H&P ADULT - PROBLEM SELECTOR PLAN 10
Transition of Care   1) PCP Contacted on Admission: (Y/N) --> Name & Phone #: Dr. Reina following in house   2) Date of Contact with PCP:  3) PCP Contacted at Discharge: (Y/N)  4) Summary of Handoff Given to PCP:   5) Post-Discharge Appointment Date and Location:

## 2019-05-09 NOTE — CONSULT NOTE ADULT - SUBJECTIVE AND OBJECTIVE BOX
74F w from [facility] rehab PMH CAD s/p stents (>10 years ago), HTN, HLD, anemia (prior [facility] admits 2/2019 and April 2019 Hgb 7-8) , Afib on eliquis, gout, seizure disorder, dementia, depression, GERD, hypotension DM2, CKD stage 3, who was sent in by Dr. Reina for a Hb of 6.5 at [facility] rehab. Pt is asymptomatic. ROS negative for headache, dizziness, fatigue, CP, SOB, AP, fevers, chills, sweats. Pt repeatedly states she feels completely well and is asking for her sister. Of note, at [facility] pt has been receiving IV meropenem for ESBL UTI at rehab.          REVIEW OF SYSTEMS: answers simple questions   Constitutional: No fever, weight loss or fatigue  ENMT:  mild difficulty hearing,no  tinnitus, vertigo; No sinus or throat pain  Respiratory: No cough, wheezing, chills or hemoptysis  Cardiovascular: No chest pain, palpitations, dizziness or leg swelling  Gastrointestinal: No abdominal or epigastric pain. No nausea, vomiting or hematemesis; No diarrhea or constipation. No melena or hematochezia.  Skin: No itching, burning, rashes or lesions   Musculoskeletal: No joint pain or swelling; No muscle, back or extremity pain    PAST MEDICAL & SURGICAL HISTORY:  Chronic GERD  History of hypotension  Gout  Atrial fibrillation  Diabetes  Depression  Dementia  Seizure  Anemia  HLD (hyperlipidemia)  HTN (hypertension)  CAD (coronary artery disease)  S/P partial gastrectomy  H/O thyroidectomy      FAMILY HISTORY:  Family history of hypertension (Mother)  Family history of diabetes mellitus (Mother)      SOCIAL HISTORY:  Smoking Status: [ ] Current, [ ] Former, [ ] Never  Pack Years:    MEDICATIONS:  MEDICATIONS  (STANDING):  allopurinol 100 milliGRAM(s) Oral daily  apixaban 5 milliGRAM(s) Oral every 12 hours  ascorbic acid 500 milliGRAM(s) Oral daily  atorvastatin 10 milliGRAM(s) Oral at bedtime  baclofen 10 milliGRAM(s) Oral three times a day  donepezil 10 milliGRAM(s) Oral at bedtime  escitalopram 10 milliGRAM(s) Oral daily  ferrous    sulfate 325 milliGRAM(s) Oral daily  iron sucrose IVPB 100 milliGRAM(s) IV Intermittent once  lacosamide 200 milliGRAM(s) Oral two times a day  levETIRAcetam 500 milliGRAM(s) Oral two times a day  memantine 10 milliGRAM(s) Oral daily  midodrine 10 milliGRAM(s) Oral every 8 hours  mirtazapine 15 milliGRAM(s) Oral at bedtime  pantoprazole    Tablet 40 milliGRAM(s) Oral before breakfast  potassium chloride   Powder 20 milliEquivalent(s) Oral once  risperiDONE   Tablet 0.25 milliGRAM(s) Oral at bedtime    MEDICATIONS  (PRN):  ALPRAZolam 0.25 milliGRAM(s) Oral two times a day PRN anxiety      Allergies    No Known Allergies    Intolerances        Vital Signs Last 24 Hrs  T(C): 36.9 (09 May 2019 16:23), Max: 36.9 (09 May 2019 10:27)  T(F): 98.4 (09 May 2019 16:23), Max: 98.5 (09 May 2019 10:27)  HR: 70 (09 May 2019 16:23) (62 - 70)  BP: 114/67 (09 May 2019 16:23) (99/59 - 118/79)  BP(mean): --  RR: 16 (09 May 2019 16:23) (16 - 18)  SpO2: 100% (09 May 2019 16:23) (97% - 100%)        PHYSICAL EXAM:    General: pale in no acute distress  HEENT: MMM, conjunctiva and sclera clear  Gastrointestinal: Soft, non-tender non-distended; Normal bowel sounds; No rebound or guarding  Extremities: Normal range of motion, No clubbing, cyanosis or edema  Neurological: Alert and oriented x2  Skin: Warm and dry. No obvious rash      LABS:                        6.5    7.27  )-----------( 380      ( 09 May 2019 10:58 )             21.5     05-09    145  |  114<H>  |  21  ----------------------------<  88  3.9   |  20<L>  |  1.28    Ca    9.5      09 May 2019 10:58    TPro  5.4<L>  /  Alb  2.3<L>  /  TBili  0.2  /  DBili  x   /  AST  17  /  ALT  5<L>  /  AlkPhos  101  05-09          RADIOLOGY & ADDITIONAL STUDIES:

## 2019-05-09 NOTE — ED PROVIDER NOTE - CHIEF COMPLAINT
The patient is a 74y Female pmhx dementia, uti receiving iv abx, chronic anemia complaining of abnormal lab result. The patient is a 74y Female pmhx dementia, DM, HTN, HLD, uti receiving iv abx, chronic anemia complaining of abnormal lab result.

## 2019-05-09 NOTE — H&P ADULT - PROBLEM SELECTOR PLAN 9
F: none  E: replete PRN  N: dash/tlc, CC    #prophylactic measure  VTE: home eliquis  GI: home PPI    FULL CODE  dispo: PHILLIP

## 2019-05-09 NOTE — H&P ADULT - NSHPPHYSICALEXAM_GEN_ALL_CORE
Constitutional: elderly female WDWN resting comfortably in bed; NAD  Head: NC/AT, +hirsuitism +poor dental hygeine  Eyes: clear conjunctiva  ENT: no nasal discharge; MMM  Respiratory: CTA B/L; no W/R/R, no retractions  Cardiac: +S1/S2; RRR; no M/R/G  Gastrointestinal: soft, NT/ND; no rebound or guarding; normoactive BS  Extremities: WWP, no clubbing or cyanosis; 1+ PE LLE up to mid calf   Dermatologic: skin warm, dry and intact; no rashes, wounds, or scars  Neuro: Pt with dementia, calling out for her sister who is not present, however is pleasant/not agitated

## 2019-05-09 NOTE — CONSULT NOTE ADULT - ASSESSMENT
normochromic normocytic anemia w/o manifestations of acute GI bleed but has risk factors  transfuse, follow clinical course, patient declines GI workup but as she is not bleeding would abide by her decision  would consider workup if therapeutics necessary, in meantime Heme w/u retic, ferritin, Iron, TIBC, haptoglobin

## 2019-05-09 NOTE — ED ADULT NURSE REASSESSMENT NOTE - NS ED NURSE REASSESS COMMENT FT1
No complaints at this time, no cp, no sob, no itchiness, no pain. Pending admission. pt just states "I thought I saw my sister you just missed her" No complaints at this time, no cp, no sob, no itchiness, no pain. Pending admission.

## 2019-05-09 NOTE — ED ADULT NURSE NOTE - OBJECTIVE STATEMENT
74y F, A&o to person, hx of dementia, presents with Daughter from [facility] for low Hg. Per daughter "she was in the nursing home for IV antbiotics for a UTI and they said she had a low hemaglobin." No cp, no sob, no fevers. Denies bleeding. Lungs clear bilateral. PT states "I feel ok" Will continue to monitor.

## 2019-05-09 NOTE — H&P ADULT - NSICDXPASTMEDICALHX_GEN_ALL_CORE_FT
PAST MEDICAL HISTORY:  Anemia     CAD (coronary artery disease)     Dementia     Depression     Diabetes     HLD (hyperlipidemia)     HTN (hypertension)     Seizure PAST MEDICAL HISTORY:  Anemia     Atrial fibrillation     CAD (coronary artery disease)     Chronic GERD     Dementia     Depression     Diabetes     Gout     History of hypotension     HLD (hyperlipidemia)     HTN (hypertension)     Seizure

## 2019-05-09 NOTE — H&P ADULT - ASSESSMENT
74F w PMH CAD s/p stents (>10 years ago), HTN, HLD, anemia, seizure disorder (dx 01/2019; on Keppra), dementia, depression, DM2, who was sent in by Dr. Reina for a Hb of 6.5 at [facility] rehab. 74F w PMH CAD s/p stents (>10 years ago), HTN, HLD, anemia, Afib on eliquis, gout, seizure disorder, dementia, depression, GERD, hypotension, DM2, CKD stage 3, who was sent in by Dr. Reina for a Hb of 6.5 at [facility] rehab, asymptomatic, receiving 2 unit PRBC in ED and admitted to [facility] for further management.

## 2019-05-09 NOTE — H&P ADULT - PROBLEM SELECTOR PLAN 3
-c/w home midodrine    #UTI  Pt with recent ESBL UTI, s/p treatment with meropenem. Received 1 dose in ED, course to end today  -Pt afebrile, without leukocytosis  -NTD -c/w home midodrine    #UTI  Pt with recent ESBL UTI, s/p treatment with meropenem. Received 1 dose in ED, course to end today. Missed one dose at rehab, given 1 dose in the ED  -will get another (last dose) at 1am tonight  -Pt afebrile, without leukocytosis

## 2019-05-09 NOTE — ED ADULT NURSE REASSESSMENT NOTE - NS ED NURSE REASSESS COMMENT FT1
PT consent obtained and in chart by CHOLO Bryant. Pt discussed blood transfusion risks and benefits. Pt educated and verbalized understanding of s/s of reactions including but not limited to cp, sob, n/v, fever, chills, itchiness, rash, swelling, acute pain, dizziness, lightheadedness. Blood infusion started at 50ml an hr. Will continue to monitor.

## 2019-05-09 NOTE — ED ADULT NURSE REASSESSMENT NOTE - NS ED NURSE REASSESS COMMENT FT1
PRBC continue to infuse. NO cp, no sob, no n/v, no rash, no itchiness, no pain. PT states "I feel ok" Rate increased to 150ml hr. Pending admission. Will continue to monitor. PRBC continue to infuse. NO cp, no sob, no n/v, no rash, no itchiness, no pain. PT states "I feel ok" Rate increased to 200ml hr. Pending admission. Will continue to monitor.

## 2019-05-09 NOTE — ED PROVIDER NOTE - OBJECTIVE STATEMENT
history is provided by the patient's daughter, Lorrie. states that ir[place]a was sent in from [facility] for a low hemoglobin. 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 states that patient is staying at [facility] for iv antibiotics for a UTI. states that her mother is more "lethargic". patient does not currently endorse symptomatology at this time. does not endorse chest pain palpitaitons cough shortness of breath abdominal pain, nausea vomiting diarrhea, black tarry stools. states that she feels well. history of blood transfusion in the past. history is provided by the patient's daughter, Lorrie. states that patient was sent in from [facility] for a low hemoglobin. 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 states that patient is staying at [facility] for iv antibiotics for a UTI. states that her mother is more "lethargic". patient does not currently endorse symptomatology at this time. does not endorse chest pain palpitaitons cough shortness of breath abdominal pain, nausea vomiting diarrhea, black tarry stools. states that she feels well. history of blood transfusion in the past. history is provided by the patient's daughter, Lorrie. states that patient was sent in from [facility] for a low hemoglobin. 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 states that patient is staying at [facility] for iv antibiotics for a UTI. states that her mother is more "lethargic". patient does not currently endorse symptomatology at this time. does not endorse chest pain palpitations cough shortness of breath abdominal pain, nausea vomiting diarrhea, black tarry stools. states that she feels well. history of blood transfusion in the past.

## 2019-05-09 NOTE — H&P ADULT - PROBLEM SELECTOR PLAN 7
-c/w home atorvastatin    #CAD  Pt with a reported hx of CAD but not on ASA/plavix  -c/w home statin

## 2019-05-09 NOTE — H&P ADULT - PROBLEM SELECTOR PLAN 4
-c/w home PPI    #CKD stage 3  On admission creat 1.28 and GFR 41  -avoid nephrotoxic meds  -renally dose meds

## 2019-05-09 NOTE — ED PROVIDER NOTE - PHYSICAL EXAMINATION
General: Patient is well developed and well nourished. Patient is alert and oriented to person, place and date. Patient is laying comfortably in stretcher and appears in no acute distress.  HEENT: Head is normocephalic and atraumatic. Pupils are equal, round and reactive. Extraocular movements intact. No evidence of nystagmus, conjunctival injection, or scleral icterus. External ears symmetric without evidence of discharge.  Nose is symmetric, non-tender, patent without evidence of discharge. Teeth in good repair. Uvula midline.   Neck: Supple with no evidence of lymphadenopathy.  Full range of motion.  Heart: Regular rate and rhythm. No murmurs, rubs or gallops.   Lungs: Clear to auscultation bilaterally with equal chest expansion. No note of wheezes, rhonchi, rales. Equal chest expansion. No note of retractions.  Abdomen: Bowel sounds present in all four quadrants. Soft, non-tender, non-distended without signs of masses, rebound or guarding. No note of hepatosplenomegaly. No CVA tenderness bilaterally. Negative Roberts sign. No pain present over McBurney's point.  Neuro: GCS 15. Moving all extremities without discomfort. Sensation intact in all four extremities. gait steady   Skin: Warm, dry and intact without evidence of rashes, bruising, pallor, jaundice or cyanosis.   Psych: Mood and affect appropriate.

## 2019-05-09 NOTE — ED ADULT TRIAGE NOTE - CHIEF COMPLAINT QUOTE
patient BIBA from nursing home for low hgb. patient complains of generalzied weakness, denies chest pain, sob, abdominal pain, n/v.

## 2019-05-09 NOTE — ED PROVIDER NOTE - CLINICAL SUMMARY MEDICAL DECISION MAKING FREE TEXT BOX
This is a pleasant 74 year old female presenting to the ed for low hgb noted at her rehab facility. patient staying at rehab for iv abx. physical exam, patient appears well non-toxic and does not endorse symptomatology at this time. This is a pleasant 74 year old female presenting to the ed for low hgb noted at her rehab facility. patient staying at rehab for iv abx. physical exam, patient appears well non-toxic and does not endorse symptomatology at this time. exam intac.t hbg 6.5. This is a pleasant 74 year old female presenting to the ed for low hgb noted at her rehab facility. patient staying at rehab for iv abx. physical exam, patient appears well non-toxic and does not endorse symptomatology at this time. exam intac.t hbg 6.5 and patient to be transfused. spoke with Dr. Reina (pt's PMD) who is agreeable to admission under his service. patient and family agreeable to plan.

## 2019-05-09 NOTE — H&P ADULT - HISTORY OF PRESENT ILLNESS
74F w PMH CAD s/p stents (>10 years ago), HTN, HLD, anemia, seizure disorder (dx 01/2019; on Keppra), dementia, depression, DM2, who was sent in by Dr. Reina for a Hb of 6.5 at [facility] rehab. Pt is asymptomatic, denying ___________. Of note, at [facility] pt has been receiving IV meropenem for ESBL UTI at rehab.     Vitals in the ED:    T(F): 98.5    HR: 62 - 68    BP: 99/59 - 111/73     RR: 16 - 18    SpO2: 97% - 98%  Labs on admission: Hb 6.5 (baseline 8-9), normocytic; PTT of 46; Cl 114; albumin 2.3.    Pt given 500 cc NS bolus, routine dose of meropenem, and 2 unit PRBC. Will be admitted to [facility] for further management. 74F w PMH CAD s/p stents (>10 years ago), HTN, HLD, anemia, Afib on eliquis, gout, seizure disorder, dementia, depression, GERD, hypotension DM2, CKD stage 3, who was sent in by Dr. Reina for a Hb of 6.5 at [facility] rehab. Pt is asymptomatic. ROS negative for headache, dizziness, fatigue, CP, SOB, AP, fevers, chills, sweats. Pt repeatedly states she feels completely well and is asking for her sister. Of note, at [facility] pt has been receiving IV meropenem for ESBL UTI at rehab.      Vitals in the ED:    T(F): 98.5    HR: 62 - 68    BP: 99/59 - 111/73     RR: 16 - 18    SpO2: 97% - 98%  Labs on admission: Hb 6.5 (baseline 8-9), normocytic; PTT of 46; Cl 114; albumin 2.3.    Pt given 500 cc NS bolus, routine dose of meropenem, and 2 unit PRBC. Will be admitted to [facility] for further management.

## 2019-05-09 NOTE — H&P ADULT - PROBLEM SELECTOR PLAN 1
Pt found to be anemic 6.5 at [facility] rehab. Asymptomatic. No s/s of blood loss. Likely mixed picture 2/2 ACD (from ESRD) and iron deficiency based on iron studies in february. Normocytic.   -s/p 2 units PRBC in ED  -f/u post transfusion CBC  -transfuse for HB < 7; active T & S  -monitor CBC  -c/w home iron supplements Pt found to be anemic 6.5 at [facility]. Asymptomatic. No s/s of blood loss. Likely mixed picture 2/2 ACD (from ESRD) and iron deficiency based on iron studies in february. Normocytic.   -s/p 2 units PRBC in ED  -f/u post transfusion CBC  -transfuse for HB < 7; active T & S  -monitor CBC  -c/w home iron supplements  -will give one dose of IV iron per Dr. Reina Pt found to be anemic 6.5 at [facility]. Asymptomatic. No s/s of blood loss. Likely mixed picture 2/2 ACD (from ESRD) and iron deficiency based on iron studies in february. Normocytic. FOBT negative  -s/p 2 units PRBC in ED  -f/u post transfusion CBC  -transfuse for HB < 7; active T & S  -monitor CBC  -c/w home iron supplements  -will give one dose of IV iron per Dr. Reina

## 2019-05-09 NOTE — H&P ADULT - NSHPLABSRESULTS_GEN_ALL_CORE
6.5    7.27  )-----------( 380      ( 09 May 2019 10:58 )             21.5     05-09    145  |  114<H>  |  21  ----------------------------<  88  3.9   |  20<L>  |  1.28    Ca    9.5      09 May 2019 10:58    TPro  5.4<L>  /  Alb  2.3<L>  /  TBili  0.2  /  DBili  x   /  AST  17  /  ALT  5<L>  /  AlkPhos  101  05-09    PTT - ( 09 May 2019 10:58 )  PTT:46.0 sec              RADIOLOGY, EKG & ADDITIONAL TESTS: Reviewed.

## 2019-05-10 ENCOUNTER — TRANSCRIPTION ENCOUNTER (OUTPATIENT)
Age: 74
End: 2019-05-10

## 2019-05-10 LAB
ANION GAP SERPL CALC-SCNC: 11 MMOL/L — SIGNIFICANT CHANGE UP (ref 5–17)
BLD GP AB SCN SERPL QL: NEGATIVE — SIGNIFICANT CHANGE UP
BUN SERPL-MCNC: 21 MG/DL — SIGNIFICANT CHANGE UP (ref 7–23)
CALCIUM SERPL-MCNC: 9 MG/DL — SIGNIFICANT CHANGE UP (ref 8.4–10.5)
CHLORIDE SERPL-SCNC: 115 MMOL/L — HIGH (ref 96–108)
CO2 SERPL-SCNC: 20 MMOL/L — LOW (ref 22–31)
CREAT SERPL-MCNC: 1.22 MG/DL — SIGNIFICANT CHANGE UP (ref 0.5–1.3)
GLUCOSE SERPL-MCNC: 80 MG/DL — SIGNIFICANT CHANGE UP (ref 70–99)
HAPTOGLOB SERPL-MCNC: 177 MG/DL — SIGNIFICANT CHANGE UP (ref 34–200)
HBA1C BLD-MCNC: 4.9 % — SIGNIFICANT CHANGE UP (ref 4–5.6)
HCT VFR BLD CALC: 24.9 % — LOW (ref 34.5–45)
HGB BLD-MCNC: 7.9 G/DL — LOW (ref 11.5–15.5)
LDH SERPL L TO P-CCNC: 290 U/L — HIGH (ref 50–242)
MAGNESIUM SERPL-MCNC: 1.5 MG/DL — LOW (ref 1.6–2.6)
MCHC RBC-ENTMCNC: 29.9 PG — SIGNIFICANT CHANGE UP (ref 27–34)
MCHC RBC-ENTMCNC: 31.7 GM/DL — LOW (ref 32–36)
MCV RBC AUTO: 94.3 FL — SIGNIFICANT CHANGE UP (ref 80–100)
NRBC # BLD: 0 /100 WBCS — SIGNIFICANT CHANGE UP (ref 0–0)
PLATELET # BLD AUTO: 334 K/UL — SIGNIFICANT CHANGE UP (ref 150–400)
POTASSIUM SERPL-MCNC: 4.1 MMOL/L — SIGNIFICANT CHANGE UP (ref 3.5–5.3)
POTASSIUM SERPL-SCNC: 4.1 MMOL/L — SIGNIFICANT CHANGE UP (ref 3.5–5.3)
RBC # BLD: 2.64 M/UL — LOW (ref 3.8–5.2)
RBC # FLD: 18.8 % — HIGH (ref 10.3–14.5)
RETICS #: 51.8 K/UL — SIGNIFICANT CHANGE UP (ref 25–125)
RETICS/RBC NFR: 2 % — SIGNIFICANT CHANGE UP (ref 0.5–2.5)
RH IG SCN BLD-IMP: POSITIVE — SIGNIFICANT CHANGE UP
SODIUM SERPL-SCNC: 146 MMOL/L — HIGH (ref 135–145)
WBC # BLD: 6.44 K/UL — SIGNIFICANT CHANGE UP (ref 3.8–10.5)
WBC # FLD AUTO: 6.44 K/UL — SIGNIFICANT CHANGE UP (ref 3.8–10.5)

## 2019-05-10 RX ADMIN — LEVETIRACETAM 500 MILLIGRAM(S): 250 TABLET, FILM COATED ORAL at 17:27

## 2019-05-10 RX ADMIN — Medication 10 MILLIGRAM(S): at 06:20

## 2019-05-10 RX ADMIN — APIXABAN 5 MILLIGRAM(S): 2.5 TABLET, FILM COATED ORAL at 17:27

## 2019-05-10 RX ADMIN — APIXABAN 5 MILLIGRAM(S): 2.5 TABLET, FILM COATED ORAL at 06:20

## 2019-05-10 RX ADMIN — Medication 100 MILLIGRAM(S): at 11:18

## 2019-05-10 RX ADMIN — Medication 10 MILLIGRAM(S): at 22:34

## 2019-05-10 RX ADMIN — DONEPEZIL HYDROCHLORIDE 10 MILLIGRAM(S): 10 TABLET, FILM COATED ORAL at 22:34

## 2019-05-10 RX ADMIN — LACOSAMIDE 200 MILLIGRAM(S): 50 TABLET ORAL at 17:30

## 2019-05-10 RX ADMIN — RISPERIDONE 0.25 MILLIGRAM(S): 4 TABLET ORAL at 22:34

## 2019-05-10 RX ADMIN — Medication 325 MILLIGRAM(S): at 11:17

## 2019-05-10 RX ADMIN — ESCITALOPRAM OXALATE 10 MILLIGRAM(S): 10 TABLET, FILM COATED ORAL at 11:17

## 2019-05-10 RX ADMIN — LACOSAMIDE 200 MILLIGRAM(S): 50 TABLET ORAL at 06:20

## 2019-05-10 RX ADMIN — LEVETIRACETAM 500 MILLIGRAM(S): 250 TABLET, FILM COATED ORAL at 06:20

## 2019-05-10 RX ADMIN — MIDODRINE HYDROCHLORIDE 10 MILLIGRAM(S): 2.5 TABLET ORAL at 17:27

## 2019-05-10 RX ADMIN — PANTOPRAZOLE SODIUM 40 MILLIGRAM(S): 20 TABLET, DELAYED RELEASE ORAL at 06:20

## 2019-05-10 RX ADMIN — ATORVASTATIN CALCIUM 10 MILLIGRAM(S): 80 TABLET, FILM COATED ORAL at 22:34

## 2019-05-10 RX ADMIN — MEROPENEM 100 MILLIGRAM(S): 1 INJECTION INTRAVENOUS at 00:24

## 2019-05-10 RX ADMIN — MEMANTINE HYDROCHLORIDE 10 MILLIGRAM(S): 10 TABLET ORAL at 11:18

## 2019-05-10 RX ADMIN — MIRTAZAPINE 15 MILLIGRAM(S): 45 TABLET, ORALLY DISINTEGRATING ORAL at 22:34

## 2019-05-10 RX ADMIN — MIDODRINE HYDROCHLORIDE 10 MILLIGRAM(S): 2.5 TABLET ORAL at 06:20

## 2019-05-10 RX ADMIN — RISPERIDONE 0.25 MILLIGRAM(S): 4 TABLET ORAL at 00:05

## 2019-05-10 RX ADMIN — Medication 500 MILLIGRAM(S): at 11:18

## 2019-05-10 RX ADMIN — MIDODRINE HYDROCHLORIDE 10 MILLIGRAM(S): 2.5 TABLET ORAL at 22:34

## 2019-05-10 RX ADMIN — Medication 10 MILLIGRAM(S): at 17:27

## 2019-05-10 NOTE — PROGRESS NOTE ADULT - SUBJECTIVE AND OBJECTIVE BOX
NICKI GATES  74y  Female      Patient is a 74y old  Female who presents with a chief complaint of anemia (10 May 2019 13:42)      INTERVAL HPI/OVERNIGHT EVENTS:    SUBJECTIVE:    Vital Signs Last 24 Hrs  T(C): 36.8 (10 May 2019 14:20), Max: 36.9 (09 May 2019 16:23)  T(F): 98.3 (10 May 2019 14:20), Max: 98.4 (09 May 2019 16:23)  HR: 60 (10 May 2019 14:20) (60 - 70)  BP: 110/67 (10 May 2019 14:20) (110/67 - 139/85)  BP(mean): --  RR: 18 (10 May 2019 14:20) (16 - 18)  SpO2: 98% (10 May 2019 14:20) (97% - 100%)    Physical Exam: Constitutional: elderly female WDWN resting comfortably in bed; NAD  	Head: NC/AT, +hirsuitism +poor dental hygeine  	Eyes: clear conjunctiva  	ENT: no nasal discharge; MMM  	Respiratory: CTA B/L; no W/R/R, no retractions  	Cardiac: +S1/S2; RRR; no M/R/G  	Gastrointestinal: soft, NT/ND; no rebound or guarding; normoactive BS  	Extremities: WWP, no clubbing or cyanosis; 1+ PE LLE up to mid calf   	Dermatologic: skin warm, dry and intact; no rashes, wounds, or scars  Neuro: Pt with dementia, calling out for her sister who is not present, however is pleasant/not agitated      LABS:                        7.9    6.44  )-----------( 334      ( 10 May 2019 05:48 )             24.9     05-10    146<H>  |  115<H>  |  21  ----------------------------<  80  4.1   |  20<L>  |  1.22    Ca    9.0      10 May 2019 05:48  Mg     1.5     05-10    TPro  5.4<L>  /  Alb  2.3<L>  /  TBili  0.2  /  DBili  x   /  AST  17  /  ALT  5<L>  /  AlkPhos  101  05-09    PTT - ( 09 May 2019 10:58 )  PTT:46.0 sec      CAPILLARY BLOOD GLUCOSE          RADIOLOGY & ADDITIONAL TESTS:    Imaging Personally Reviewed:  [ ] YES  [ ] NO    Codes:    Lines:

## 2019-05-10 NOTE — PROGRESS NOTE ADULT - PROBLEM SELECTOR PLAN 10
Transition of Care   1) PCP Contacted on Admission: (Y/N) --> Name & Phone #: Dr. Reian following in house   2) Date of Contact with PCP:  3) PCP Contacted at Discharge: (Y/N)  4) Summary of Handoff Given to PCP:   5) Post-Discharge Appointment Date and Location:

## 2019-05-10 NOTE — DISCHARGE NOTE PROVIDER - NSDCCPCAREPLAN_GEN_ALL_CORE_FT
PRINCIPAL DISCHARGE DIAGNOSIS  Diagnosis: Anemia, unspecified type  Assessment and Plan of Treatment: You came  in with a low blood count of 6.5.  You recived 1 unit of blood and IV iron. Your blood cound increased to 8.3 which is an acceptable level. You will be discharged as there is no concern for infection. Please continue your normal medications and follow up with Dr. Reina in 1-2 weeks.

## 2019-05-10 NOTE — PROGRESS NOTE ADULT - ASSESSMENT
74F w PMH CAD s/p stents (>10 years ago), HTN, HLD, anemia, Afib on eliquis, gout, seizure disorder, dementia, depression, GERD, hypotension, DM2, CKD stage 3, who was sent in by Dr. Reina for a Hb of 6.5 at [facility] rehab, asymptomatic, receiving 2 unit PRBC in ED and admitted to [facility] for further management.

## 2019-05-10 NOTE — PROGRESS NOTE ADULT - SUBJECTIVE AND OBJECTIVE BOX
Pt seen and examined   no complaints  does not talk much    REVIEW OF SYSTEMS:  Constitutional: No fever,   Cardiovascular: No chest pain,   Gastrointestinal: No abdominal or epigastric pain. No melena or hematochezia.  Skin: No itching, burning, rashes or lesions       MEDICATIONS:  MEDICATIONS  (STANDING):  allopurinol 100 milliGRAM(s) Oral daily  apixaban 5 milliGRAM(s) Oral every 12 hours  ascorbic acid 500 milliGRAM(s) Oral daily  atorvastatin 10 milliGRAM(s) Oral at bedtime  baclofen 10 milliGRAM(s) Oral three times a day  donepezil 10 milliGRAM(s) Oral at bedtime  escitalopram 10 milliGRAM(s) Oral daily  ferrous    sulfate 325 milliGRAM(s) Oral daily  lacosamide 200 milliGRAM(s) Oral two times a day  levETIRAcetam 500 milliGRAM(s) Oral two times a day  memantine 10 milliGRAM(s) Oral daily  midodrine 10 milliGRAM(s) Oral every 8 hours  mirtazapine 15 milliGRAM(s) Oral at bedtime  pantoprazole    Tablet 40 milliGRAM(s) Oral before breakfast  risperiDONE   Tablet 0.25 milliGRAM(s) Oral at bedtime    MEDICATIONS  (PRN):  ALPRAZolam 0.25 milliGRAM(s) Oral two times a day PRN anxiety      Allergies    No Known Allergies    Intolerances        Vital Signs Last 24 Hrs  T(C): 36.7 (10 May 2019 06:02), Max: 36.9 (09 May 2019 10:27)  T(F): 98.1 (10 May 2019 06:02), Max: 98.5 (09 May 2019 10:27)  HR: 67 (10 May 2019 06:02) (62 - 70)  BP: 116/70 (10 May 2019 06:02) (99/59 - 139/85)  BP(mean): --  RR: 18 (10 May 2019 06:02) (16 - 18)  SpO2: 97% (10 May 2019 06:02) (97% - 100%)    05-09 @ 07:01  -  05-10 @ 07:00  --------------------------------------------------------  IN: 100 mL / OUT: 0 mL / NET: 100 mL        PHYSICAL EXAM:    General: ; in no acute distress  HEENT: MMM, conjunctiva and sclera clear  Gastrointestinal: Soft non-tender non-distended; Normal bowel sounds; No hepatosplenomegaly  Skin: Warm and dry. No obvious rash    LABS:      CBC Full  -  ( 10 May 2019 05:48 )  WBC Count : 6.44 K/uL  RBC Count : 2.64 M/uL  Hemoglobin : 7.9 g/dL  Hematocrit : 24.9 %  Platelet Count - Automated : 334 K/uL  Mean Cell Volume : 94.3 fl  Mean Cell Hemoglobin : 29.9 pg  Mean Cell Hemoglobin Concentration : 31.7 gm/dL  Auto Neutrophil # : x  Auto Lymphocyte # : x  Auto Monocyte # : x  Auto Eosinophil # : x  Auto Basophil # : x  Auto Neutrophil % : x  Auto Lymphocyte % : x  Auto Monocyte % : x  Auto Eosinophil % : x  Auto Basophil % : x    05-10    146<H>  |  115<H>  |  21  ----------------------------<  80  4.1   |  20<L>  |  1.22    Ca    9.0      10 May 2019 05:48  Mg     1.5     05-10    TPro  5.4<L>  /  Alb  2.3<L>  /  TBili  0.2  /  DBili  x   /  AST  17  /  ALT  5<L>  /  AlkPhos  101  05-09    PTT - ( 09 May 2019 10:58 )  PTT:46.0 sec                  RADIOLOGY & ADDITIONAL STUDIES (The following images were personally reviewed):

## 2019-05-10 NOTE — DISCHARGE NOTE PROVIDER - HOSPITAL COURSE
74F w PMH CAD s/p stents (>10 years ago), HTN, HLD, anemia, Afib on eliquis, gout, seizure disorder, dementia, depression, GERD, hypotension DM2, CKD stage 3, who was sent in by Dr. Reina for a Hb of 6.5 at [facility] rehab. Pt recived 1 unit and IV iron. Hgb surjit appropraitely.

## 2019-05-10 NOTE — DISCHARGE NOTE NURSING/CASE MANAGEMENT/SOCIAL WORK - NSDCDPATPORTLINK_GEN_ALL_CORE
You can access the [facility], offered by [facility], by registering with the following website: [email or web address]

## 2019-05-10 NOTE — DISCHARGE NOTE PROVIDER - CARE PROVIDER_API CALL
Jose Reina)  Internal Medicine  [address] [address]  Phone: [phone]  Fax: [phone]  Follow Up Time:

## 2019-05-10 NOTE — PROGRESS NOTE ADULT - PROBLEM SELECTOR PLAN 1
Pt found to be anemic 6.5 at [facility] rehab. Asymptomatic. No s/s of blood loss. Likely mixed picture 2/2 ACD (from ESRD) and iron deficiency based on iron studies in february. Normocytic. FOBT negative  -s/p 2 units PRBC in ED  -f/u post transfusion CBC  -transfuse for HB < 7; active T & S  -monitor CBC  -c/w home iron supplements  -s/p 1 unit and iron with appropriate response

## 2019-05-10 NOTE — PROGRESS NOTE ADULT - PROBLEM SELECTOR PLAN 5
-c/w home mirtazapine, lexapro  -c/w home respiradone for psychosis   -c/w home xanax PRN for anxiety     #seizure disorder  -c/w home vimpat and keppra

## 2019-05-10 NOTE — PROGRESS NOTE ADULT - PROBLEM SELECTOR PLAN 3
-c/w home midodrine    #UTI  Pt with recent ESBL UTI, s/p treatment with meropenem. Received 1 dose in ED, course to end today. Missed one dose at rehab, given 1 dose in the ED  -will get another (last dose) at 1am tonight  -Pt afebrile, without leukocytosis

## 2019-05-11 VITALS
RESPIRATION RATE: 18 BRPM | DIASTOLIC BLOOD PRESSURE: 728 MMHG | SYSTOLIC BLOOD PRESSURE: 134 MMHG | HEART RATE: 90 BPM | TEMPERATURE: 98 F | OXYGEN SATURATION: 99 %

## 2019-05-11 PROCEDURE — 86850 RBC ANTIBODY SCREEN: CPT

## 2019-05-11 PROCEDURE — 85025 COMPLETE CBC W/AUTO DIFF WBC: CPT

## 2019-05-11 PROCEDURE — 83735 ASSAY OF MAGNESIUM: CPT

## 2019-05-11 PROCEDURE — P9016: CPT

## 2019-05-11 PROCEDURE — 85730 THROMBOPLASTIN TIME PARTIAL: CPT

## 2019-05-11 PROCEDURE — 86900 BLOOD TYPING SEROLOGIC ABO: CPT

## 2019-05-11 PROCEDURE — 85045 AUTOMATED RETICULOCYTE COUNT: CPT

## 2019-05-11 PROCEDURE — 36415 COLL VENOUS BLD VENIPUNCTURE: CPT

## 2019-05-11 PROCEDURE — 80048 BASIC METABOLIC PNL TOTAL CA: CPT

## 2019-05-11 PROCEDURE — 93005 ELECTROCARDIOGRAM TRACING: CPT

## 2019-05-11 PROCEDURE — 83036 HEMOGLOBIN GLYCOSYLATED A1C: CPT

## 2019-05-11 PROCEDURE — 83615 LACTATE (LD) (LDH) ENZYME: CPT

## 2019-05-11 PROCEDURE — 86901 BLOOD TYPING SEROLOGIC RH(D): CPT

## 2019-05-11 PROCEDURE — 99285 EMERGENCY DEPT VISIT HI MDM: CPT | Mod: 25

## 2019-05-11 PROCEDURE — 80053 COMPREHEN METABOLIC PANEL: CPT

## 2019-05-11 PROCEDURE — 85027 COMPLETE CBC AUTOMATED: CPT

## 2019-05-11 PROCEDURE — 36430 TRANSFUSION BLD/BLD COMPNT: CPT

## 2019-05-11 PROCEDURE — 83010 ASSAY OF HAPTOGLOBIN QUANT: CPT

## 2019-05-11 PROCEDURE — 86923 COMPATIBILITY TEST ELECTRIC: CPT

## 2019-05-11 PROCEDURE — 96374 THER/PROPH/DIAG INJ IV PUSH: CPT

## 2019-05-11 RX ORDER — APIXABAN 2.5 MG/1
1 TABLET, FILM COATED ORAL
Qty: 60 | Refills: 0
Start: 2019-05-11 | End: 2019-06-09

## 2019-05-11 RX ADMIN — Medication 10 MILLIGRAM(S): at 06:36

## 2019-05-11 RX ADMIN — LEVETIRACETAM 500 MILLIGRAM(S): 250 TABLET, FILM COATED ORAL at 06:36

## 2019-05-11 RX ADMIN — MIDODRINE HYDROCHLORIDE 10 MILLIGRAM(S): 2.5 TABLET ORAL at 06:36

## 2019-05-11 RX ADMIN — APIXABAN 5 MILLIGRAM(S): 2.5 TABLET, FILM COATED ORAL at 06:36

## 2019-05-11 RX ADMIN — LACOSAMIDE 200 MILLIGRAM(S): 50 TABLET ORAL at 06:36

## 2019-05-11 RX ADMIN — PANTOPRAZOLE SODIUM 40 MILLIGRAM(S): 20 TABLET, DELAYED RELEASE ORAL at 06:36

## 2019-05-11 NOTE — PROGRESS NOTE ADULT - SUBJECTIVE AND OBJECTIVE BOX
Pt seen and examined   denies pain    REVIEW OF SYSTEMS:  dementia      MEDICATIONS:  MEDICATIONS  (STANDING):  allopurinol 100 milliGRAM(s) Oral daily  apixaban 5 milliGRAM(s) Oral every 12 hours  ascorbic acid 500 milliGRAM(s) Oral daily  atorvastatin 10 milliGRAM(s) Oral at bedtime  baclofen 10 milliGRAM(s) Oral three times a day  donepezil 10 milliGRAM(s) Oral at bedtime  escitalopram 10 milliGRAM(s) Oral daily  ferrous    sulfate 325 milliGRAM(s) Oral daily  lacosamide 200 milliGRAM(s) Oral two times a day  levETIRAcetam 500 milliGRAM(s) Oral two times a day  memantine 10 milliGRAM(s) Oral daily  midodrine 10 milliGRAM(s) Oral every 8 hours  mirtazapine 15 milliGRAM(s) Oral at bedtime  pantoprazole    Tablet 40 milliGRAM(s) Oral before breakfast  risperiDONE   Tablet 0.25 milliGRAM(s) Oral at bedtime    MEDICATIONS  (PRN):  ALPRAZolam 0.25 milliGRAM(s) Oral two times a day PRN anxiety      Allergies    No Known Allergies    Intolerances        Vital Signs Last 24 Hrs  T(C): 36.7 (11 May 2019 05:40), Max: 36.9 (10 May 2019 22:30)  T(F): 98.1 (11 May 2019 05:40), Max: 98.4 (10 May 2019 22:30)  HR: 67 (11 May 2019 05:40) (60 - 67)  BP: 98/60 (11 May 2019 05:40) (98/60 - 124/77)  BP(mean): --  RR: 18 (11 May 2019 05:40) (17 - 18)  SpO2: 97% (11 May 2019 05:40) (97% - 98%)      PHYSICAL EXAM:    General: Well developed; well nourished; in no acute distress  HEENT: MMM, conjunctiva and sclera clear  Gastrointestinal: Soft non-tender non-distended; Normal bowel sounds; No hepatosplenomegaly  Skin: Warm and dry. No obvious rash    LABS:      CBC Full  -  ( 10 May 2019 05:48 )  WBC Count : 6.44 K/uL  RBC Count : 2.64 M/uL  Hemoglobin : 7.9 g/dL  Hematocrit : 24.9 %  Platelet Count - Automated : 334 K/uL  Mean Cell Volume : 94.3 fl  Mean Cell Hemoglobin : 29.9 pg  Mean Cell Hemoglobin Concentration : 31.7 gm/dL  Auto Neutrophil # : x  Auto Lymphocyte # : x  Auto Monocyte # : x  Auto Eosinophil # : x  Auto Basophil # : x  Auto Neutrophil % : x  Auto Lymphocyte % : x  Auto Monocyte % : x  Auto Eosinophil % : x  Auto Basophil % : x    05-10    146<H>  |  115<H>  |  21  ----------------------------<  80  4.1   |  20<L>  |  1.22    Ca    9.0      10 May 2019 05:48  Mg     1.5     05-10                        RADIOLOGY & ADDITIONAL STUDIES (The following images were personally reviewed):

## 2019-05-11 NOTE — PROGRESS NOTE ADULT - SUBJECTIVE AND OBJECTIVE BOX
PUD FOR DR GARCIA    Patient is a 74y old  Female who presents with a chief complaint of anemia (10 May 2019 13:42)    All new data reviewed, including VS, lab, imaging, Rx and documentation    Vital Signs Last 24 Hrs  T(C): 36.8 (10 May 2019 14:20), Max: 36.9 (09 May 2019 16:23)  T(F): 98.3 (10 May 2019 14:20), Max: 98.4 (09 May 2019 16:23)  HR: 60 (10 May 2019 14:20) (60 - 70)  BP: 110/67 (10 May 2019 14:20) (110/67 - 139/85)  BP(mean): --  RR: 18 (10 May 2019 14:20) (16 - 18)  SpO2: 98% (10 May 2019 14:20) (97% - 100%)    Physical Exam: Constitutional: elderly female WDWN resting comfortably in bed; NAD  	Head: NC/AT, +hirsuitism +poor dental hygeine  	Eyes: clear conjunctiva  	ENT: no nasal discharge; MMM  	Respiratory: CTA B/L; no W/R/R, no retractions  	Cardiac: +S1/S2; RRR; no M/R/G  	Gastrointestinal: soft, NT/ND; no rebound or guarding; normoactive BS  	Extremities: WWP, no clubbing or cyanosis; 1+ PE LLE up to mid calf   	Dermatologic: skin warm, dry and intact; no rashes, wounds, or scars  Neuro: Pt with dementia, calling out for her sister who is not present, however is pleasant/not agitated    LABS:                        7.9    6.44  )-----------( 334      ( 10 May 2019 05:48 )             24.9     05-10    146<H>  |  115<H>  |  21  ----------------------------<  80  4.1   |  20<L>  |  1.22    Ca    9.0      10 May 2019 05:48  Mg     1.5     05-10    TPro  5.4<L>  /  Alb  2.3<L>  /  TBili  0.2  /  DBili  x   /  AST  17  /  ALT  5<L>  /  AlkPhos  101  05-09    PTT - ( 09 May 2019 10:58 )  PTT:46.0 sec      CAPILLARY BLOOD GLUCOSE    RADIOLOGY & ADDITIONAL TESTS:    Imaging Personally Reviewed:  [x] YES  [ ] NO    Codes:    Lines:    Assessment and Plan:   · Assessment		  74F w PMH CAD s/p stents (>10 years ago), HTN, HLD, anemia, Afib on eliquis, gout, seizure disorder, dementia, depression, GERD, hypotension, DM2, CKD stage 3, who was sent in by Dr. Reina for a Hb of 6.5 at [facility] rehab, asymptomatic, receiving 2 unit PRBC in ED and admitted to [facility] for further management.      Problem/Plan - 1:  ·  Problem: Anemia, unspecified type.  Plan: Pt found to be anemic 6.5 at [facility] rehab. Asymptomatic. No s/s of blood loss. Likely mixed picture 2/2 ACD (from ESRD) and iron deficiency based on iron studies in february. Normocytic. FOBT negative  -s/p 2 units PRBC in ED  -f/u post transfusion CBC  -transfuse for HB < 7; active T & S  -monitor CBC  -c/w home iron supplements  -s/p 1 unit and iron with appropriate response.      Problem/Plan - 2:  ·  Problem: Atrial fibrillation.  Plan: -c/w home eliquis.      Problem/Plan - 3:  ·  Problem: History of hypotension.  Plan: -c/w home midodrine    #UTI  Pt with recent ESBL UTI, s/p treatment with meropenem. Received 1 dose in ED, course to end today. Missed one dose at rehab, given 1 dose in the ED  -will get another (last dose) at 1am tonight  -Pt afebrile, without leukocytosis.      Problem/Plan - 4:  ·  Problem: Chronic GERD.  Plan: -c/w home PPI    #CKD stage 3  On admission creat 1.28 and GFR 41  -avoid nephrotoxic meds  -renally dose meds.      Problem/Plan - 5:  ·  Problem: Depression.  Plan: -c/w home mirtazapine, lexapro  -c/w home respiradone for psychosis   -c/w home xanax PRN for anxiety     #seizure disorder  -c/w home vimpat and keppra.      Problem/Plan - 6:  Problem: Dementia. Plan: -c/w home donepezil and memantine    #gout  -c/w home allopurinol and baclofen.     Problem/Plan - 7:  ·  Problem: HLD (hyperlipidemia).  Plan: -c/w home atorvastatin    #CAD  Pt with a reported hx of CAD but not on ASA/plavix due to NOAC  -c/w home statin.      Problem/Plan - 8:  ·  Problem: Diabetes.  Plan: Pt with a reported hx of DM, but not on any meds  -f/u A1c.      Problem/Plan - 9:  ·  Problem: Nutrition, metabolism, and development symptoms.  Plan: F: none  E: replete PRN  N: dash/tlc, CC    #prophylactic measure  VTE: home eliquis  GI: home PPI    FULL CODE

## 2019-05-16 DIAGNOSIS — I25.10 ATHEROSCLEROTIC HEART DISEASE OF NATIVE CORONARY ARTERY WITHOUT ANGINA PECTORIS: ICD-10-CM

## 2019-05-16 DIAGNOSIS — F32.9 MAJOR DEPRESSIVE DISORDER, SINGLE EPISODE, UNSPECIFIED: ICD-10-CM

## 2019-05-16 DIAGNOSIS — I12.9 HYPERTENSIVE CHRONIC KIDNEY DISEASE WITH STAGE 1 THROUGH STAGE 4 CHRONIC KIDNEY DISEASE, OR UNSPECIFIED CHRONIC KIDNEY DISEASE: ICD-10-CM

## 2019-05-16 DIAGNOSIS — N18.3 CHRONIC KIDNEY DISEASE, STAGE 3 (MODERATE): ICD-10-CM

## 2019-05-16 DIAGNOSIS — Z66 DO NOT RESUSCITATE: ICD-10-CM

## 2019-05-16 DIAGNOSIS — G40.909 EPILEPSY, UNSPECIFIED, NOT INTRACTABLE, WITHOUT STATUS EPILEPTICUS: ICD-10-CM

## 2019-05-16 DIAGNOSIS — D64.9 ANEMIA, UNSPECIFIED: ICD-10-CM

## 2019-05-16 DIAGNOSIS — D63.1 ANEMIA IN CHRONIC KIDNEY DISEASE: ICD-10-CM

## 2019-05-16 DIAGNOSIS — D50.9 IRON DEFICIENCY ANEMIA, UNSPECIFIED: ICD-10-CM

## 2019-05-16 DIAGNOSIS — K21.9 GASTRO-ESOPHAGEAL REFLUX DISEASE WITHOUT ESOPHAGITIS: ICD-10-CM

## 2019-05-16 DIAGNOSIS — E78.5 HYPERLIPIDEMIA, UNSPECIFIED: ICD-10-CM

## 2019-05-16 DIAGNOSIS — F03.90 UNSPECIFIED DEMENTIA WITHOUT BEHAVIORAL DISTURBANCE: ICD-10-CM

## 2019-05-29 NOTE — ED ADULT TRIAGE NOTE - MEANS OF ARRIVAL
Sudden onset vocal and diaphragmatic spasming, post-viral suggests post-encephalitic myoclonus. Her brain, cspine and tspine was negative for obvious lesions responsible. DDx at this point includes post-viral central myoclonus vs peripheral nerve myoclonus. No weight loss to suggest paraneoplastic dz. Panel neg. Literature suggests keppra as first line. Suggested she try keppra 500mg QAM and 1500 at night due to report of daytime sleepiness.   stretcher

## 2019-06-04 NOTE — BEHAVIORAL HEALTH ASSESSMENT NOTE - NS ED BHA MED ROS HEMATOLOGIC LYMPHATIC
gradually became more accepting of z-vibe and was willing to hold it and give it kisses. Tolerated small amount of pudding or peanut butter on z-vibe at end which she was willing to kiss. z-vibe did not make it past teeth as patient was noted to clench teeth together to keep it out at all times     []Met  [x]Partially met  []Not met   Goal 4: Patient will tolerate sensory play with min aversions noted Patient tolerated touching peanut butter and pudding which she was able to wipe on napkin after. Patient noted to calm once learning that she could wipe off immediately. When on hand for longer than ~5 seconds patient noted to wipe hand on shirt or in hair []Met  [x]Partially met  []Not met     LONG TERM GOALS/ TREATMENT SESSION:  Goal 1: Patient will tolerate x2 bites of food of any consistency  Goal progressing. See STG data   []Met  [x]Partially met  []Not met   Goal 2: Patient will remain seated during a meal time routine given no more than 3 redirections Goal progressing.  See STG data         []Met  [x]Partially met  []Not met       EDUCATION/HOME EXERCISE PROGRAM (HEP)  New Education/HEP provided to patient/family/caregiver:    []Yes:     [x]No (Continued review of prior education)   If yes Education Provided:    Method of Education:     [x]Discussion     []Demonstration    [] Written     []Other  Evaluation of Patients Response to Education:         [x]Patient and or caregiver verbalized understanding  []Patient and or Caregiver Demonstrated without assistance   []Patient and or Caregiver Demonstrated with assistance  []Needs additional instruction to demonstrate understanding of education    ASSESSMENT  Patient tolerated todays treatment session:    [x] Good   []  Fair   []  Poor  Limitations/difficulties with treatment session due to:   []Pain     []Fatigue     []Other medical complications     []Other    Comments:    PLAN  [x]Continue with current plan of care  [][facility]  []IHold per patient Unable to assess

## 2020-05-11 NOTE — ED PROVIDER NOTE - ATTENDING CONTRIBUTION TO CARE
Pls advise, you want pt face to face for medical clearance I agree with the assessment and care plan provided by the PA for this patient

## 2020-09-25 NOTE — PATIENT PROFILE ADULT - NSSTREETDRUGUSE_GEN_A_NUR
Bedside report completed with BERNARDO Joseph.  POC reviewed with all questions and concerns addressed.   Pt belongings including cell phone returned to mother.    never used

## 2021-03-18 NOTE — DISCHARGE NOTE NURSING/CASE MANAGEMENT/SOCIAL WORK - NSTOBACCONEVERSMOKERY/N_GEN_A
Physical Therapy  Patient not seen in therapy today. Unavailable due to medical tests/procedures.       Re-attempt plan: tomorrow    Pt in dialysis No

## 2021-05-13 NOTE — ED PROVIDER NOTE - NEUROLOGICAL, MLM
Alert and oriented, no focal deficits, no motor deficits. Inflammation Suggestive Of Cancer Camouflage Histology Text: There was a dense lymphocytic infiltrate which prevented adequate histologic evaluation of adjacent structures.

## 2021-06-07 NOTE — PROGRESS NOTE ADULT - SUBJECTIVE AND OBJECTIVE BOX
Addended by: VITALIY ALFARO on: 6/7/2021 12:49 PM     Modules accepted: Orders     Overnight Events:    Subjective: Patient seen and examined at bedside.    [OBJECTIVE]:    Vital Signs:  T(F): , Max: 98.1 (04-28-19 @ 19:32)  HR:  (48 - 100)  BP:  (71/50 - 139/99)  BP(mean):  (57 - 123)  RR:  (12 - 20)  SpO2:  (96% - 100%)  Wt(kg): --  CVP(cm H2O): --      04-27 @ 07:01  -  04-28 @ 07:00  --------------------------------------------------------  IN: 198.2 mL / OUT: 1003 mL / NET: -804.8 mL    04-28 @ 07:01  -  04-29 @ 06:57  --------------------------------------------------------  IN: 2027.5 mL / OUT: 520 mL / NET: 1507.5 mL      CAPILLARY BLOOD GLUCOSE          Physcial Exam:  T(F): 97.4 (04-29-19 @ 01:05)  HR: 78 (04-29-19 @ 06:00)  BP: 98/58 (04-29-19 @ 06:00)  RR: 12 (04-29-19 @ 06:00)  SpO2: 96% (04-29-19 @ 06:00)  Wt(kg): --    General: AO x 3, NAD, Comfortable, Pleasant, Anxious, Agitated, Ill, Frail, Cachectic, Resp distress  HEENT: PERRL/ EOMI, no scleral icterus, no ptosis, MMM, no JVD, no thyromegaly  Respiratory: CTA b/l, no wheezes, rales or rhonchi  Cardiovascular: Regular, +S1 + S2  Abdomen: Soft, NTND, normoactive bowel sounds, no rebound, no guarding, no suprapubic tenderness  Extremities: No cyanosis, no clubbing, no edema, pulses equal, no calf tenderness  Skin: No rashes  Lymphatic: No cervical/supraclavicular LAD  Neurological: CN II-XII grossly intact, follows commands, moves all extremities    Medications:  MEDICATIONS  (STANDING):  atorvastatin 10 milliGRAM(s) Oral at bedtime  chlorhexidine 2% Cloths 1 Application(s) Topical <User Schedule>  donepezil 10 milliGRAM(s) Oral at bedtime  ertapenem  IVPB 500 milliGRAM(s) IV Intermittent every 24 hours  escitalopram 10 milliGRAM(s) Oral daily  ferrous    sulfate 325 milliGRAM(s) Oral daily  heparin  Infusion 550 Unit(s)/Hr (5.5 mL/Hr) IV Continuous <Continuous>  influenza   Vaccine 0.5 milliLiter(s) IntraMuscular once  lacosamide IVPB 150 milliGRAM(s) IV Intermittent every 12 hours  levETIRAcetam  IVPB 500 milliGRAM(s) IV Intermittent every 12 hours  memantine 5 milliGRAM(s) Oral daily  midodrine 10 milliGRAM(s) Oral every 8 hours  mirtazapine 15 milliGRAM(s) Oral at bedtime  pantoprazole  Injectable 40 milliGRAM(s) IV Push daily  risperiDONE   Tablet 0.25 milliGRAM(s) Oral two times a day  sodium chloride 0.9%. 1000 milliLiter(s) (75 mL/Hr) IV Continuous <Continuous>    MEDICATIONS  (PRN):      Allergies:  Allergies    No Known Allergies    Intolerances        Labs:                        7.2    6.96  )-----------( 310      ( 29 Apr 2019 05:14 )             23.2     04-29    140  |  110<H>  |  47<H>  ----------------------------<  107<H>  4.4   |  15<L>  |  2.17<H>    Ca    9.1      29 Apr 2019 05:14  Phos  3.5     04-29  Mg     1.9     04-29      PTT - ( 29 Apr 2019 01:46 )  PTT:50.5 sec      Radiology and other tests: Overnight Events: Weekend events: started risperdal on Saturday as per psych recommendations as patient noted to be confused and expressing suicidal thoughts. On Sunday, keppra and lacosamide switched to IV, patient noted to be refusing meds but able to be re-oriented and consoled. Not eating much food, but pt does eat when fed. Overnight: Heparin titrated to 5.5cc/hr.     Subjective: Patient seen and examined at bedside. Patient appeared comfortable this AM, appearing less confused than yesterday (not mumbling in Venezuelan, speaking English), AAOx2 to person and place, does not know the year. No active complaints, denies f/c/n/v, abd pain, CP, and SOB.    [OBJECTIVE]:    Vital Signs:  T(F): , Max: 98.1 (04-28-19 @ 19:32)  HR:  (48 - 100)  BP:  (71/50 - 139/99)  BP(mean):  (57 - 123)  RR:  (12 - 20)  SpO2:  (96% - 100%)  Wt(kg): --  CVP(cm H2O): --      04-27 @ 07:01  -  04-28 @ 07:00  --------------------------------------------------------  IN: 198.2 mL / OUT: 1003 mL / NET: -804.8 mL    04-28 @ 07:01  -  04-29 @ 06:57  --------------------------------------------------------  IN: 2027.5 mL / OUT: 520 mL / NET: 1507.5 mL      CAPILLARY BLOOD GLUCOSE          Physcial Exam:  T(F): 97.4 (04-29-19 @ 01:05)  HR: 78 (04-29-19 @ 06:00)  BP: 98/58 (04-29-19 @ 06:00)  RR: 12 (04-29-19 @ 06:00)  SpO2: 96% (04-29-19 @ 06:00)  Wt(kg): --    General: Elderly appearing female, comfortable, not in acute distress  HEENT: PERRL/ EOMI, no scleral icterus, MMM  Respiratory: CTA b/l, no wheezes, rales or rhonchi  Cardiovascular: RRR, s1/s2, no murmurs, rubs or gallops  Abdomen: Soft, NTND, normoactive bowel sounds x4  Extremities: No cyanosis, no clubbing, b/l LE edema unchanged with LLE > RLE, 1+ DP pulses b/l, 2+ radial pulses b/l, b/l wrist restraints in place  Lymphatic: No cervical/supraclavicular LAD  Neurological: AAOx2 to name and place, following commands, speaking English, 4/5 strength b/l LE and UE, moderate handgrip    Medications:  MEDICATIONS  (STANDING):  atorvastatin 10 milliGRAM(s) Oral at bedtime  chlorhexidine 2% Cloths 1 Application(s) Topical <User Schedule>  donepezil 10 milliGRAM(s) Oral at bedtime  ertapenem  IVPB 500 milliGRAM(s) IV Intermittent every 24 hours  escitalopram 10 milliGRAM(s) Oral daily  ferrous    sulfate 325 milliGRAM(s) Oral daily  heparin  Infusion 550 Unit(s)/Hr (5.5 mL/Hr) IV Continuous <Continuous>  influenza   Vaccine 0.5 milliLiter(s) IntraMuscular once  lacosamide IVPB 150 milliGRAM(s) IV Intermittent every 12 hours  levETIRAcetam  IVPB 500 milliGRAM(s) IV Intermittent every 12 hours  memantine 5 milliGRAM(s) Oral daily  midodrine 10 milliGRAM(s) Oral every 8 hours  mirtazapine 15 milliGRAM(s) Oral at bedtime  pantoprazole  Injectable 40 milliGRAM(s) IV Push daily  risperiDONE   Tablet 0.25 milliGRAM(s) Oral two times a day  sodium chloride 0.9%. 1000 milliLiter(s) (75 mL/Hr) IV Continuous <Continuous>    MEDICATIONS  (PRN):      Allergies:  Allergies    No Known Allergies    Intolerances        Labs:                        7.2    6.96  )-----------( 310      ( 29 Apr 2019 05:14 )             23.2     04-29    140  |  110<H>  |  47<H>  ----------------------------<  107<H>  4.4   |  15<L>  |  2.17<H>    Ca    9.1      29 Apr 2019 05:14  Phos  3.5     04-29  Mg     1.9     04-29      PTT - ( 29 Apr 2019 01:46 )  PTT:50.5 sec      Radiology and other tests: HOSPITAL COURSE:  73F w PMH CAD s/p stents (>10 years ago), HTN, HLD, anemia, seizure disorder (dx 01/2019; on Keppra), dementia, depression, recent admission in 02/2019 for UTI, presented from home after labs by home physician showed ALE (baseline Cr 1.23 -> 6.44). Presenting with decreased urination, LLE pain, patient has been genreally bedbound since leaving nursing home a week prior to admission. In ED, T: 97.9, P: 71, BP: 92/65, RR: 16, O2: 96% RA. Labs BUN/Cr: 86/6.44, K: 6.7, bicarb: 16, P: 5. ECG w/ peaking T waves. Fierro placed, given ceftriaxone for UTI positive on U/A, and insulin/d50/kayexalate for hyperK. MOLST filled, patient DNR/DNI but still wishing to have abx, IVF, pressors, and medical management. Started on vanc/zosyn. Dopplers b/l LE positive for extensive b/l DVTs. Vanc discontinued as urine culture positive for ESBL E.coli, s/p 1 dose of meropenem, now on ertapenem.    Overnight Events: Weekend events: started risperdal on Saturday as per psych recommendations as patient noted to be confused and expressing suicidal thoughts. On Sunday, keppra and lacosamide switched to IV, patient noted to be refusing meds but able to be re-oriented and consoled. Not eating much food, but pt does eat when fed. Overnight: Heparin titrated to 5.5cc/hr.     Subjective: Patient seen and examined at bedside. Patient appeared comfortable this AM, appearing less confused than yesterday (not mumbling in Mongolian, speaking English), AAOx2 to person and place, does not know the year. No active complaints, denies f/c/n/v, abd pain, CP, and SOB.    [OBJECTIVE]:    Vital Signs:  T(F): , Max: 98.1 (04-28-19 @ 19:32)  HR:  (48 - 100)  BP:  (71/50 - 139/99)  BP(mean):  (57 - 123)  RR:  (12 - 20)  SpO2:  (96% - 100%)  Wt(kg): --  CVP(cm H2O): --      04-27 @ 07:01  -  04-28 @ 07:00  --------------------------------------------------------  IN: 198.2 mL / OUT: 1003 mL / NET: -804.8 mL    04-28 @ 07:01  -  04-29 @ 06:57  --------------------------------------------------------  IN: 2027.5 mL / OUT: 520 mL / NET: 1507.5 mL      CAPILLARY BLOOD GLUCOSE          Physcial Exam:  T(F): 97.4 (04-29-19 @ 01:05)  HR: 78 (04-29-19 @ 06:00)  BP: 98/58 (04-29-19 @ 06:00)  RR: 12 (04-29-19 @ 06:00)  SpO2: 96% (04-29-19 @ 06:00)  Wt(kg): --    General: Elderly appearing female, comfortable, not in acute distress  HEENT: PERRL/ EOMI, no scleral icterus, MMM  Respiratory: CTA b/l, no wheezes, rales or rhonchi  Cardiovascular: RRR, s1/s2, no murmurs, rubs or gallops  Abdomen: Soft, NTND, normoactive bowel sounds x4  Extremities: No cyanosis, no clubbing, b/l LE edema unchanged with LLE > RLE, 1+ DP pulses b/l, 2+ radial pulses b/l, b/l wrist restraints in place  Lymphatic: No cervical/supraclavicular LAD  Neurological: AAOx2 to name and place, following commands, speaking English, 4/5 strength b/l LE and UE, moderate handgrip    Medications:  MEDICATIONS  (STANDING):  atorvastatin 10 milliGRAM(s) Oral at bedtime  chlorhexidine 2% Cloths 1 Application(s) Topical <User Schedule>  donepezil 10 milliGRAM(s) Oral at bedtime  ertapenem  IVPB 500 milliGRAM(s) IV Intermittent every 24 hours  escitalopram 10 milliGRAM(s) Oral daily  ferrous    sulfate 325 milliGRAM(s) Oral daily  heparin  Infusion 550 Unit(s)/Hr (5.5 mL/Hr) IV Continuous <Continuous>  influenza   Vaccine 0.5 milliLiter(s) IntraMuscular once  lacosamide IVPB 150 milliGRAM(s) IV Intermittent every 12 hours  levETIRAcetam  IVPB 500 milliGRAM(s) IV Intermittent every 12 hours  memantine 5 milliGRAM(s) Oral daily  midodrine 10 milliGRAM(s) Oral every 8 hours  mirtazapine 15 milliGRAM(s) Oral at bedtime  pantoprazole  Injectable 40 milliGRAM(s) IV Push daily  risperiDONE   Tablet 0.25 milliGRAM(s) Oral two times a day  sodium chloride 0.9%. 1000 milliLiter(s) (75 mL/Hr) IV Continuous <Continuous>    MEDICATIONS  (PRN):      Allergies:  Allergies    No Known Allergies    Intolerances        Labs:                        7.2    6.96  )-----------( 310      ( 29 Apr 2019 05:14 )             23.2     04-29    140  |  110<H>  |  47<H>  ----------------------------<  107<H>  4.4   |  15<L>  |  2.17<H>    Ca    9.1      29 Apr 2019 05:14  Phos  3.5     04-29  Mg     1.9     04-29      PTT - ( 29 Apr 2019 01:46 )  PTT:50.5 sec      Radiology and other tests: HOSPITAL COURSE:  73F w PMH CAD s/p stents (>10 years ago), HTN, HLD, anemia, seizure disorder (dx 01/2019; on Keppra), dementia, depression, recent admission in 02/2019 for UTI, presented from home after labs by home physician showed ALE (baseline Cr 1.23 -> 6.44). Presenting with decreased urination, LLE pain, patient has been genreally bedbound since leaving nursing home a week prior to admission. In ED, T: 97.9, P: 71, BP: 92/65, RR: 16, O2: 96% RA. Labs BUN/Cr: 86/6.44, K: 6.7, bicarb: 16, P: 5. ECG w/ peaking T waves. Fierro placed, given ceftriaxone for UTI positive on U/A, and insulin/d50/kayexalate for hyperK. MOLST filled, patient DNR/DNI but still wishing to have abx, IVF, pressors, and medical management. Started on vanc/zosyn. Dopplers b/l LE positive for extensive b/l DVTs. Echo: LV wall motion normal, EF 55-60%, normal LA/RA, calcified aortic valve minimal AS, trace TR, no pHTN. Renal U/S showing bilateral renal cysts, medical renal disease, angiomyolipoma. Vanc discontinued as urine culture positive for ESBL E.coli, s/p 1 dose of meropenem, now on ertapenem (last dose 5/9). Midodrine 10 mg q8h started on 4/26, patient has not been requiring levophed for 2 days. Intermittently refuses medications and expresses suicidal ideations, psych started risperidone. Switched lacosamide and keppra to IV due to intermittent refusal. Off heparin today, switched to loading dose of eliquis for DVTs. Stable for continued management on regional medical floors for complicated UTI.    Overnight Events: Weekend events: started risperdal on Saturday as per psych recommendations as patient noted to be confused and expressing suicidal thoughts. On Sunday, keppra and lacosamide switched to IV, patient noted to be refusing meds but able to be re-oriented and consoled. Not eating much food, but pt does eat when fed. Overnight: Heparin titrated to 5.5cc/hr.     Subjective: Patient seen and examined at bedside. Patient appeared comfortable this AM, appearing less confused than yesterday (not mumbling in Nigerian, speaking English), AAOx2 to person and place, does not know the year. No active complaints, denies f/c/n/v, abd pain, CP, and SOB.    [OBJECTIVE]:    Vital Signs:  T(F): , Max: 98.1 (04-28-19 @ 19:32)  HR:  (48 - 100)  BP:  (71/50 - 139/99)  BP(mean):  (57 - 123)  RR:  (12 - 20)  SpO2:  (96% - 100%)  Wt(kg): --  CVP(cm H2O): --      04-27 @ 07:01  -  04-28 @ 07:00  --------------------------------------------------------  IN: 198.2 mL / OUT: 1003 mL / NET: -804.8 mL    04-28 @ 07:01  -  04-29 @ 06:57  --------------------------------------------------------  IN: 2027.5 mL / OUT: 520 mL / NET: 1507.5 mL      CAPILLARY BLOOD GLUCOSE          Physcial Exam:  T(F): 97.4 (04-29-19 @ 01:05)  HR: 78 (04-29-19 @ 06:00)  BP: 98/58 (04-29-19 @ 06:00)  RR: 12 (04-29-19 @ 06:00)  SpO2: 96% (04-29-19 @ 06:00)  Wt(kg): --    General: Elderly appearing female, comfortable, not in acute distress  HEENT: PERRL/ EOMI, no scleral icterus, MMM  Respiratory: CTA b/l, no wheezes, rales or rhonchi  Cardiovascular: RRR, s1/s2, no murmurs, rubs or gallops  Abdomen: Soft, NTND, normoactive bowel sounds x4  Extremities: No cyanosis, no clubbing, b/l LE edema unchanged with LLE > RLE, 1+ DP pulses b/l, 2+ radial pulses b/l, b/l wrist restraints in place  Lymphatic: No cervical/supraclavicular LAD  Neurological: AAOx2 to name and place, following commands, speaking English, 4/5 strength b/l LE and UE, moderate handgrip    Medications:  MEDICATIONS  (STANDING):  atorvastatin 10 milliGRAM(s) Oral at bedtime  chlorhexidine 2% Cloths 1 Application(s) Topical <User Schedule>  donepezil 10 milliGRAM(s) Oral at bedtime  ertapenem  IVPB 500 milliGRAM(s) IV Intermittent every 24 hours  escitalopram 10 milliGRAM(s) Oral daily  ferrous    sulfate 325 milliGRAM(s) Oral daily  heparin  Infusion 550 Unit(s)/Hr (5.5 mL/Hr) IV Continuous <Continuous>  influenza   Vaccine 0.5 milliLiter(s) IntraMuscular once  lacosamide IVPB 150 milliGRAM(s) IV Intermittent every 12 hours  levETIRAcetam  IVPB 500 milliGRAM(s) IV Intermittent every 12 hours  memantine 5 milliGRAM(s) Oral daily  midodrine 10 milliGRAM(s) Oral every 8 hours  mirtazapine 15 milliGRAM(s) Oral at bedtime  pantoprazole  Injectable 40 milliGRAM(s) IV Push daily  risperiDONE   Tablet 0.25 milliGRAM(s) Oral two times a day  sodium chloride 0.9%. 1000 milliLiter(s) (75 mL/Hr) IV Continuous <Continuous>    MEDICATIONS  (PRN):      Allergies:  Allergies    No Known Allergies    Intolerances        Labs:                        7.2    6.96  )-----------( 310      ( 29 Apr 2019 05:14 )             23.2     04-29    140  |  110<H>  |  47<H>  ----------------------------<  107<H>  4.4   |  15<L>  |  2.17<H>    Ca    9.1      29 Apr 2019 05:14  Phos  3.5     04-29  Mg     1.9     04-29      PTT - ( 29 Apr 2019 01:46 )  PTT:50.5 sec      Radiology and other tests:  No new studies [facility]  HOSPITAL COURSE:  73F w PMH CAD s/p stents (>10 years ago), HTN, HLD, anemia, seizure disorder (dx 01/2019; on Keppra), dementia, depression, recent admission in 02/2019 for UTI, presented from home after labs by home physician showed ALE (baseline Cr 1.23 -> 6.44). Presenting with decreased urination, LLE pain, patient has been genreally bedbound since leaving nursing home a week prior to admission. In ED, T: 97.9, P: 71, BP: 92/65, RR: 16, O2: 96% RA. Labs BUN/Cr: 86/6.44, K: 6.7, bicarb: 16, P: 5. ECG w/ peaking T waves. Fierro placed, given ceftriaxone for UTI positive on U/A, and insulin/d50/kayexalate for hyperK. MOLST filled, patient DNR/DNI but still wishing to have abx, IVF, pressors, and medical management. Started on vanc/zosyn. Dopplers b/l LE positive for extensive b/l DVTs. Echo: LV wall motion normal, EF 55-60%, normal LA/RA, calcified aortic valve minimal AS, trace TR, no pHTN. Renal U/S showing bilateral renal cysts, medical renal disease, angiomyolipoma. Vanc discontinued as urine culture positive for ESBL E.coli, s/p 1 dose of meropenem, now on ertapenem (last dose 5/9). Midodrine 10 mg q8h started on 4/26, patient has not been requiring levophed for 2 days. Intermittently refuses medications and expresses suicidal ideations, psych started risperidone. Switched lacosamide and keppra to IV due to intermittent refusal. Off heparin today, switched to loading dose of eliquis for DVTs. Stable for continued management on regional medical floors for complicated UTI.    Overnight Events: Weekend events: started risperdal on Saturday as per psych recommendations as patient noted to be confused and expressing suicidal thoughts. On Sunday, keppra and lacosamide switched to IV, patient noted to be refusing meds but able to be re-oriented and consoled. Not eating much food, but pt does eat when fed. Overnight: Heparin titrated to 5.5cc/hr.     Subjective: Patient seen and examined at bedside. Patient appeared comfortable this AM, appearing less confused than yesterday (not mumbling in Dominican, speaking English), AAOx2 to person and place, does not know the year. No active complaints, denies f/c/n/v, abd pain, CP, and SOB.    [OBJECTIVE]:    Vital Signs:  T(F): , Max: 98.1 (04-28-19 @ 19:32)  HR:  (48 - 100)  BP:  (71/50 - 139/99)  BP(mean):  (57 - 123)  RR:  (12 - 20)  SpO2:  (96% - 100%)  Wt(kg): --  CVP(cm H2O): --      04-27 @ 07:01  -  04-28 @ 07:00  --------------------------------------------------------  IN: 198.2 mL / OUT: 1003 mL / NET: -804.8 mL    04-28 @ 07:01  -  04-29 @ 06:57  --------------------------------------------------------  IN: 2027.5 mL / OUT: 520 mL / NET: 1507.5 mL      CAPILLARY BLOOD GLUCOSE          Physcial Exam:  T(F): 97.4 (04-29-19 @ 01:05)  HR: 78 (04-29-19 @ 06:00)  BP: 98/58 (04-29-19 @ 06:00)  RR: 12 (04-29-19 @ 06:00)  SpO2: 96% (04-29-19 @ 06:00)  Wt(kg): --    General: Elderly appearing female, comfortable, not in acute distress  HEENT: PERRL/ EOMI, no scleral icterus, MMM  Respiratory: CTA b/l, no wheezes, rales or rhonchi  Cardiovascular: RRR, s1/s2, no murmurs, rubs or gallops  Abdomen: Soft, NTND, normoactive bowel sounds x4  Extremities: No cyanosis, no clubbing, b/l LE edema unchanged with LLE > RLE, 1+ DP pulses b/l, 2+ radial pulses b/l, b/l wrist restraints in place  Lymphatic: No cervical/supraclavicular LAD  Neurological: AAOx2 to name and place, following commands, speaking English, 4/5 strength b/l LE and UE, moderate handgrip    Medications:  MEDICATIONS  (STANDING):  atorvastatin 10 milliGRAM(s) Oral at bedtime  chlorhexidine 2% Cloths 1 Application(s) Topical <User Schedule>  donepezil 10 milliGRAM(s) Oral at bedtime  ertapenem  IVPB 500 milliGRAM(s) IV Intermittent every 24 hours  escitalopram 10 milliGRAM(s) Oral daily  ferrous    sulfate 325 milliGRAM(s) Oral daily  heparin  Infusion 550 Unit(s)/Hr (5.5 mL/Hr) IV Continuous <Continuous>  influenza   Vaccine 0.5 milliLiter(s) IntraMuscular once  lacosamide IVPB 150 milliGRAM(s) IV Intermittent every 12 hours  levETIRAcetam  IVPB 500 milliGRAM(s) IV Intermittent every 12 hours  memantine 5 milliGRAM(s) Oral daily  midodrine 10 milliGRAM(s) Oral every 8 hours  mirtazapine 15 milliGRAM(s) Oral at bedtime  pantoprazole  Injectable 40 milliGRAM(s) IV Push daily  risperiDONE   Tablet 0.25 milliGRAM(s) Oral two times a day  sodium chloride 0.9%. 1000 milliLiter(s) (75 mL/Hr) IV Continuous <Continuous>    MEDICATIONS  (PRN):      Allergies:  Allergies    No Known Allergies    Intolerances        Labs:                        7.2    6.96  )-----------( 310      ( 29 Apr 2019 05:14 )             23.2     04-29    140  |  110<H>  |  47<H>  ----------------------------<  107<H>  4.4   |  15<L>  |  2.17<H>    Ca    9.1      29 Apr 2019 05:14  Phos  3.5     04-29  Mg     1.9     04-29      PTT - ( 29 Apr 2019 01:46 )  PTT:50.5 sec      Radiology and other tests:  No new studies

## 2021-10-20 NOTE — ED ADULT TRIAGE NOTE - SOURCE OF INFORMATION
Pt to Ed with family who reports that pt has been having nausea and vomiting for 2 and half months. Pts  states pt has lost weight and is weak .  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
Patient

## 2022-08-23 NOTE — PHYSICAL THERAPY INITIAL EVALUATION ADULT - LEVEL OF INDEPENDENCE: SUPINE/SIT, REHAB EVAL
[address] [address]  Dept: [address] [facility] Urology Office Note - Established    Patient:  Dayna Rutledge  YOB: 1938  Date: 8/23/2022    The patient is a 80 y.o. male who presents todayfor evaluation of the following problems:   Chief Complaint   Patient presents with    Prostate Cancer     6 month with PSA and possible Eligard       HPI  He is here in follow up for prostate cancer. He had an Eligard 6 months ago. His PSA is 0.10  He is voiding well. No complaints at this time. Summary of old records: N/A    Additional History: N/A    Procedures Today: N/A    Urinalysis today:  No results found for this visit on 08/23/22. Last several PSA's:  No results found for: PSA  Last total testosterone:  No results found for: TESTOSTERONE    AUA Symptom Score (8/23/2022): Last BUN and creatinine:  No results found for: BUN  No results found for: CREATININE    Additional Lab/Culture results: none    Imaging Reviewed during this Office Visit: none  (results were independently reviewed by physician and radiology report verified)    PAST MEDICAL, FAMILY AND SOCIAL HISTORY UPDATE:  Past Medical History:   Diagnosis Date    Abdominal pain     Cancer ([address]     Hx of prostate    Gastric ulcer     GERD (gastroesophageal reflux disease)      Past Surgical History:   Procedure Laterality Date    COLONOSCOPY  2010    per pt, \"some polyps\"    UPPER GASTROINTESTINAL ENDOSCOPY  6/27/11,  11/7/2011     No family history on file.   Outpatient Medications Marked as Taking for the 8/23/22 encounter (Office Visit) with Raquel Almonte MD   Medication Sig Dispense Refill    lisinopril-hydroCHLOROthiazide (PRINZIDE;ZESTORETIC) 20-12.5 MG per tablet TAKE 1 TABLET BY MOUTH ONCE DAILY FOR 90 DAYS      omeprazole (PRILOSEC) 20 MG delayed release capsule TAKE 1 CAPSULE BY Refill request for: metoPROLOL tartrate (LOPRESSOR) 25 MG tablet     MOUTH ONCE DAILY IN THE MORNING BEFORE BREAKFAST 90 capsule 1    valsartan (DIOVAN) 80 MG tablet       aspirin 81 MG EC tablet Take 81 mg by mouth daily. Multiple Vitamin (MULTIVITAMIN PO) Take  by mouth daily. Cholecalciferol (VITAMIN D) 2000 UNITS TABS Take  by mouth Daily. cetirizine (ZYRTEC) 10 MG tablet Take 10 mg by mouth daily. simvastatin (ZOCOR) 40 MG tablet Take 40 mg by mouth nightly. Patient has no known allergies. Social History     Tobacco Use   Smoking Status Never   Smokeless Tobacco Never     (Ifpatient a smoker, smoking cessation counseling offered)    Social History     Substance and Sexual Activity   Alcohol Use Yes    Comment: occasionally       REVIEW OF SYSTEMS:  Review of Systems    Physical Exam:      Vitals:    08/23/22 1357   Temp: 97.6 °F (36.4 °C)     Body mass index is 32.6 kg/m². Patient is a 80 y.o. male in no acute distress and alert and oriented to person, place and time. Physical Exam  Constitutional: Patient in no acute distress. Neuro: Alert and oriented to person, place and time. Psych: Mood normal, affect normal  Lungs: Respiratory effort is normal  Cardiovascular: Warm & Pink  Abdomen: Soft, non-tender, non-distended with no CVA,  No flank tenderness,  Or hepatosplenomegaly   Lymphatics: No palpablelymphadenopathy. Bladder non-tender and not distended. Assessment and Plan      1. Prostate cancer ([address]    2. Nocturia           Plan:         He is doing well. PSA is 0.10. Plan on 6 month Eligard today. F/U in 6 months with a PSA. Return in about 6 months (around 2/23/2023) for Labs. Prescriptions Ordered:  No orders of the defined types were placed in this encounter. Orders Placed:  Orders Placed This Encounter   Procedures    PSA, Diagnostic     Standing Status:   Future     Standing Expiration Date:   8/23/2023             Kristel Montero MD    Agree with the ROS entered by the MA. maximum assist (25% patients effort)/moderate assist (50% patients effort)

## 2023-03-10 NOTE — ED ADULT NURSE NOTE - OBJECTIVE STATEMENT
[email or web address]
74 y/o female with hx of anemia, HTN, dementia, seizures was BIBA to [facility] ER for [facility] nursing rehab facility for abnormal lab results and altered mental status for the past 24 hours. As per EMS, patient has been more lethargic for the past 24 hours accompanied with elevated WBC and low therapeutic Keppra level. As per EMS, patient has been refusing medications for the past 24 hours. Upon assessment, ecchymosis noted to forehead, abdomen soft, lung fields WNL, breathing is equal and unlabored, pulses palpable, skin is hot to touch, patient responding to painful stimuli and disorientated. EKG performed. MD Perez at bedside. As per EMS, ecchymosis noted to forehead because patient fell last week after having a seizure. Care in progress.

## 2023-04-13 NOTE — ED PROVIDER NOTE - TOBACCO USE
General Daily Progress Note    Admit Date: 3/23/2023    Subjective:     Patient feels OK with poor PO intake    Current Facility-Administered Medications   Medication Dose Route Frequency    isosorbide mononitrate ER (IMDUR) tablet 30 mg  30 mg Oral DAILY    hydrALAZINE (APRESOLINE) tablet 25 mg  25 mg Oral TID    magnesium oxide (MAG-OX) tablet 400 mg  400 mg Oral BID    metoprolol succinate (TOPROL-XL) XL tablet 12.5 mg  12.5 mg Oral DAILY    albuterol-ipratropium (DUO-NEB) 2.5 MG-0.5 MG/3 ML  3 mL Nebulization Q4H PRN    bumetanide (BUMEX) tablet 0.5 mg  0.5 mg Oral BID    sodium bicarbonate tablet 650 mg  650 mg Oral BID    potassium chloride SR (KLOR-CON 10) tablet 20 mEq  20 mEq Oral DAILY    doxepin (SINEquan) capsule 20 mg  20 mg Oral QHS    LORazepam (ATIVAN) tablet 0.5 mg  0.5 mg Oral Q6H PRN    dicyclomine (BENTYL) tablet 20 mg  20 mg Oral Q6H PRN    epoetin yanna-epbx (RETACRIT) injection 10,000 Units  10,000 Units SubCUTAneous EVERY WED & SAT    aluminum & magnesium hydroxide-simethicone (MYLANTA II) oral suspension 30 mL  30 mL Oral Q4H PRN    polyethylene glycol (MIRALAX) packet 17 g  17 g Oral BID    bisacodyL (DULCOLAX) tablet 5 mg  5 mg Oral DAILY PRN    pantoprazole (PROTONIX) tablet 40 mg  40 mg Oral ACB&D    sodium chloride (NS) flush 5-40 mL  5-40 mL IntraVENous Q8H    sorbitoL 70 % solution 30 mL  30 mL Oral DAILY PRN    amiodarone (CORDARONE) tablet 200 mg  200 mg Oral BID    atorvastatin (LIPITOR) tablet 40 mg  40 mg Oral QHS    aspirin delayed-release tablet 81 mg  81 mg Oral DAILY    acetaminophen (TYLENOL) tablet 650 mg  650 mg Oral Q6H PRN    Or    acetaminophen (TYLENOL) suppository 650 mg  650 mg Rectal Q6H PRN    ondansetron (ZOFRAN ODT) tablet 4 mg  4 mg Oral Q8H PRN    Or    ondansetron (ZOFRAN) injection 4 mg  4 mg IntraVENous Q6H PRN        Review of Systems  A comprehensive review of systems was negative.     Objective:     Patient Vitals for the past 24 hrs:   BP Temp Pulse Resp SpO2 Weight   04/13/23 0716 (!) 140/83 97.3 °F (36.3 °C) 72 -- 96 % --   04/13/23 0313 106/69 97.6 °F (36.4 °C) (!) 59 17 98 % --   04/12/23 2211 122/73 97.9 °F (36.6 °C) 64 17 95 % --   04/12/23 2208 122/73 97.9 °F (36.6 °C) -- -- -- --   04/12/23 2206 -- -- -- -- -- 148 lb 2.4 oz (67.2 kg)   04/12/23 1910 (!) 107/56 98 °F (36.7 °C) (!) 58 18 97 % --   04/12/23 1745 (!) 115/47 -- 62 -- 98 % --   04/12/23 1412 113/61 98.1 °F (36.7 °C) (!) 57 20 98 % --   04/12/23 1017 114/67 97.7 °F (36.5 °C) (!) 57 20 97 % --   04/12/23 0956 115/71 -- (!) 52 -- -- --   04/12/23 0953 -- -- -- -- 97 % --       No intake/output data recorded. 04/11 1901 - 04/13 0700  In: -   Out: 1000 [Urine:1000]    Physical Exam: Visit Vitals  BP (!) 140/83 (BP 1 Location: Right upper arm)   Pulse 72   Temp 97.3 °F (36.3 °C)   Resp 17   Ht 5' 4\" (1.626 m)   Wt 148 lb 2.4 oz (67.2 kg)   SpO2 96%   Breastfeeding No   BMI 25.43 kg/m²     General appearance: alert, cooperative, no distress, appears stated age. Mildly tachypneic  Neck: supple, symmetrical, trachea midline, no adenopathy, thyroid: not enlarged, symmetric, no tenderness/mass/nodules, no carotid bruit, and JVD to angle of jaw  Lungs: Sparse basilar rales  Heart: regular rate and rhythm, S1, S2 normal, no murmur, click, rub or gallop  Abdomen: soft, non-tender. Bowel sounds normal. No masses,  no organomegaly, no distention  Extremities: extremities normal, atraumatic, no cyanosis or edema,indurated r forearm    Assessment:     Principal Problem:    Acute upper GI bleed (3/23/2023)    Active Problems:    COPD (chronic obstructive pulmonary disease) ([facility] (9/4/2014)      Angiectasia (7/15/2018)      Acute non-ST elevation myocardial infarction (NSTEMI) ([facility] (3/10/2023)      Acute blood loss anemia (3/23/2023)      Ischemic cardiomyopathy (3/24/2023)        Plan:   1. Recurrent GI bleed leading to a drop in hemoglobin.   Patient had black stools throughout the night suggesting recurrent upper tract GI bleed. PillCam today. Results noted. Repeat EGD reveals no active bleeding and suspicion of further AVMs distally. CBC remains stable. 3.  Renal function is worsening as expected with attempts at diuresis without adequate inotropic support. Cardiorenal syndrome noted. 6   Apparent worsening CHF which was aggravated by transfusion of 2 units of packed cells superimposed on significant reduction in cardiac output. .. Continue treatment of congestive heart failure with diuretic. CXR reveals worsening failure. Other cardioactive medications have been started. Possible inotrope assuming no contraindication . NSVT noted. Amiodorone added. 7.   DC parenteral antibiotics. 8. Doxepin added to stimulate appetite. Unknown if ever smoked

## 2023-07-31 NOTE — PROGRESS NOTE BEHAVIORAL HEALTH - NS ED [facility] MED ROS HEMATOLOGIC LYMPHATIC
Date of Service:  7/31/2023    PCP:  Miguel Marc MD     Chief Complaint:     Microscopic hematuria   Right flank pain     OAB    History of Present Illness:  The patient is a 46 year old female who last presented to the office on 04/25/2023     Her symptoms have been present for several months he started back in September of 2020. She is rinsing urgency, frequency, urge incontinence.  She was treated with antibiotics in the past without improvement.  She has had multiple years which have come back either no growth or contaminated.  She denies any gross hematuria UTIs in the past.     She returned to the office on 10/24/2022 still on Myrbetriq 25 mg daily. Patient stated her urgency and frequency was stable. Patient stated she had time to make it to the bathroom. Denied urine leakage. Stated she mostly was not wearing pads for extra protection. Denied gross hematuria or recent infections.      The patient returned to the office today sooner than scheduled on 4/20 05/2023 after a urine culture from 04/03/2023 grew mixed juan but a urinalysis revealed 11-25 erythrocytes. She complains of intermittent right flank pain in the last two weeks. Stated her voiding stream and symptoms are stable on Myrbetriq 25 mg once daily. She has no new voiding complaints. Denies gross hematuria or recent infections.     Returned 07/31/2023 for cystoscopy well as review of CT urogram.    CT urogram on 07/10/2023 with no evidence of urolithiasis, renal mass, or filling defects.  Notation made of cirrhotic liver and mild splenomegaly.    She is had no new voiding complaints since last visit.  Still has intermittent flank pain but not as frequent or severe.    Medications:  Current Outpatient Medications   Medication Sig Dispense Refill   • furosemide (LASIX) 20 MG tablet TAKE 1 TABLET BY MOUTH DAILY AS NEEDED (LOWER EXTREMITY EDEMA). 30 tablet 1   • Ventolin  (90 Base) MCG/ACT inhaler INHALE 2 PUFFS BY MOUTH EVERY 4 HOURS 
AS NEEDED FOR WHEEZE OR SHORTNESS OF BREATH 18 each 3   • EPINEPHrine 0.3 MG/0.3ML auto-injector INJECT 0.3 MLS INTO THE MUSCLE AS NEEDED FOR ANAPHYLAXIS. 2 each 1   • ondansetron (ZOFRAN) 4 MG tablet TAKE 1 TABLET BY MOUTH EVERY 8 HOURS AS NEEDED FOR NAUSEA 30 tablet 11   • predniSONE (DELTASONE) 5 MG tablet Take 1 tablet by mouth daily. 30 tablet 1   • azaTHIOprine (IMURAN) 50 MG tablet TAKE 3 TABLETS BY MOUTH DAILY 270 tablet 3   • predniSONE (DELTASONE) 2.5 MG tablet Take 1 tablet by mouth daily. Take with 5 mg prednisone to equal 7.5 mg daily. 30 tablet 1   • Linzess 145 MCG capsule TAKE 1 CAPSULE BY MOUTH EVERY DAY 90 capsule 0   • QUEtiapine (SEROquel) 25 MG tablet TAKE 1/2 TO 1 TABLET BY MOUTH DAILY AS NEEDED FOR ANXIETY 30 tablet 2   • Dexlansoprazole 60 MG capsule TAKE 1 CAPSULE BY MOUTH EVERY DAY 90 capsule 1   • medroxyPROGESTERone (Depo-Provera) 150 MG/ML injectable suspension Inject 150 mg into the muscle every 3 months.     • SUMAtriptan (IMITREX) 50 MG tablet TAKE 1 TABLET BY MOUTH AT ONSET OF MIGRAINE. MAY REPEAT AFTER 2 HOURS IF NEEDED. 10 tablet 11   • Semaglutide,0.25 or 0.5MG/DOS, (Ozempic, 0.25 or 0.5 MG/DOSE,) 2 MG/3ML Solution Pen-injector Inject 0.5 mg into the skin 1 day a week. 3 mL 5   • traZODone (DESYREL) 100 MG tablet Take 2-3 tablets by mouth nightly. For next refill 270 tablet 1   • levothyroxine 112 MCG tablet TAKE 2 TABLETS BY MOUTH DAILY (BEFORE BREAKFAST). PATIENT WILL TAKE A TOTAL  MCG DAILY 180 tablet 1   • ERENumab-aooe (Aimovig) 70 MG/ML injection Inject 1 mL into the skin every 30 days. Indications: Migraine Headache 1 mL 11   • amitriptyline (ELAVIL) 150 MG tablet TAKE 1 TABLET BY MOUTH EVERY DAY AT NIGHT 90 tablet 3   • methocarbamol (ROBAXIN) 500 MG tablet Take 1 tablet by mouth 3 times daily as needed for Muscle spasms. 1 tab by mouth 3 times a day x10 days when necessary spasm 30 tablet 0   • terconazole 0.4 % vaginal cream Place 1 applicator vaginally nightly. 45 
g 0   • montelukast (SINGULAIR) 10 MG tablet TAKE 1 TABLET BY MOUTH EVERY DAY AT NIGHT 90 tablet 3   • nystatin (MYCOSTATIN) 965001 UNIT/GM powder APPLY 1 APPLICATION TOPICALLY 3 TIMES DAILY AS NEEDED (SKIN FOLD EXCORIATION). 30 g 6   • Myrbetriq 25 MG 24 hour tablet TAKE 1 TABLET BY MOUTH EVERY DAY 90 tablet 3   • Na Sulfate-K Sulfate-Mg Sulf 17.5-3.13-1.6 GM/177ML Solution Take 354 mLs by mouth as directed. 1 kit 0   • nystatin (MYCOSTATIN) 707467 UNIT/GM cream APPLY TOPICALLY 3 TIMES DAILY. TO AFFECTED AREA 30 g 7   • DULoxetine (CYMBALTA) 60 MG capsule Take 2 capsules by mouth every morning. 180 capsule 3   • polyethylene glycol (MIRALAX) 17 GM/SCOOP powder MIRALAX 17 GM PACK     • cyclobenzaprine (FLEXERIL) 10 MG tablet      • diclofenac (VOLTAREN) 1 % gel APPLY TO THE KNEES UP TO 4 TIMES DAILY AS NEEDED 300 g 0   • oxyCODONE-acetaminophen (PERCOCET)  MG per tablet daily as needed.   0   • Calcium 150 MG Tab Take 150 mg by mouth daily.      • ondansetron (ZOFRAN ODT) 4 MG disintegrating tablet Place 1 tablet onto the tongue every 8 hours as needed for Nausea. 10 tablet 0   • Cyanocobalamin (VITAMIN B-12) 1000 MCG/15ML Liquid Inject 1,000 mcg as directed every 30 days.     • Cholecalciferol (VITAMIN D3) 2000 UNITS TABS Take 1 tablet by mouth daily.       No current facility-administered medications for this visit.       Allergies:  ALLERGIES:   Allergen Reactions   • Aspirin SHORTNESS OF BREATH     Secondary to gastric sleeve surgery   • Bee Venom ANAPHYLAXIS   • Phenothiazines SEIZURES     Seizures.  Tolerates promethazine as home med    • Compazine Muscle Spasm     Neck spasms.  Tolerates promethazine as home med.    • Reglan SWELLING     Neck stiffness   • Zithromax [Azithromycin] NAUSEA     Z-pack   • Doxycycline VOMITING and NAUSEA   • Fluticasone Propionate HIVES     Other Reaction(s) from [facility]: Hives / Urticaria, Flonase   • Nasacort [Triamcinolone Acetonide] HIVES   • Red Dye #40 [Fd&C Red 
#40]      migraines       Past Medical History:   Past Medical History:   Diagnosis Date   • Abnormal Pap smear of cervix 08/03/2020    LSIL HPV+   • Anemia    • Anxiety Disorder    • Arrythmia-other    • Autoimmune hepatitis (CMD)    • Blood clot associated with vein wall inflammation 1987    Left LE DVT   • Cellulitis of leg, right     hospitalized 4/2010   • Chronic pain    • Depression    • Dyslipidemia    • Essential (primary) hypertension    • Gastritis     esophageal   • Gastroesophageal reflux disease    • Heart palpitations    • Hiatal Hernia    • High grade squamous intraepithelial lesion (HGSIL), grade 3 CARLA, on biopsy of cervix 05/04/2022   • Hypothyroidism     post surgical    • Irritable bowel syndrome    • Jaundice    • Liver disorder     hospitalized for liver problems 2/2011, 3/2011   • Lymphedema     chronic   • Menorrhagia 2020   • Migraine    • Morbid obesity (CMD)     with weight gain   • Osteoarthrosis, unspecified whether generalized or localized, lower leg 10/10/2012   • Pelvic pain 2020   • Primary osteoarthritis of both knees 01/08/2016   • Syncope     neurocardiogenic   • Thrombocytopenia (CMD)    • Thyroid Cancer 2006   • Ulcerative colitis (CMD)    • Urinary incontinence 03/28/2023   • Urinary tract infection 06/2017   • Xerostomia 08/09/2013       Family History:  Family History   Problem Relation Age of Onset   • Osteoarthritis Mother    • Asthma Mother    • Depression Mother    • Hypertension Mother    • Diabetes Mother    • Osteoarthritis Father    • Depression Father    • Heart disease Father         atrial fibrilation   • Hyperlipidemia Father    • Depression Sister    • Heart disease Sister    • Diabetes Maternal Uncle         type 1 diabetes with amputations   • Cancer Paternal Aunt         lymphoma   • Diabetes Paternal Aunt    • Diabetes Paternal Uncle    • Kidney disease Paternal Uncle    • Depression Maternal Grandmother    • Diabetes Maternal Grandmother    • Stroke Maternal 
Grandmother    • Heart disease Maternal Grandmother    • Cancer Maternal Grandfather    • Diabetes Paternal Grandmother    • Diabetes Paternal Grandfather    • Thyroid Maternal Aunt         thyroid dysfunction, no cancer       Surgical History:  Past Surgical History:   Procedure Laterality Date   • Abdominal wall surgery N/A 02/25/2020    20 lbs of loose skin removed   • Bariatric surgery      sleeve July 2017   • Bone density dxa appendicular skeleton one or more  05/30/2006   • Colonoscopy  03/13/2019   • Colonoscopy w biopsy  06/21/2011   • Colposcopy cervix & upper / adjacent vagina N/A 09/11/2020   • Esophagogastroduodenoscopy  2016   • Esophagogastroduodenoscopy  08/24/2017    Dysphagia, dilated anastomosis   • Esophagogastroduodenoscopy  03/13/2019   • Esophagogastroduodenoscopy transoral flex diag  10/26/2017    Affi, dx dysphagia and nausea, dilation preformed to 16.5mm, f/u with Camilo   • Esophagogastroduodenoscopy transoral flex w/bx single or mult  02/28/2011    EGD with Bx   • Laparoscopy surgical partial gastrectomy  07/11/2017    Sleeve gastrectomy for weight loss surgery Dr. Page   • Laparoscopy, cholecystectomy  10/27/2005    Cholecystectomy, laparoscopic   • Liver biopsy  03/01/2011   • Liver biopsy  09/09/2015   • Nasal sinus surgery     • Occult blood test tube  03/08/2011   • Removal gallbladder     • Thyroidectomy, partial  12/18/2006      x 2    :    Social History:  Social History     Socioeconomic History   • Marital status: Single     Spouse name: Not on file   • Number of children: 0   • Years of education: Not on file   • Highest education level: Not on file   Occupational History   • Occupation: Disability   Tobacco Use   • Smoking status: Never   • Smokeless tobacco: Never   Vaping Use   • Vaping Use: never used   Substance and Sexual Activity   • Alcohol use: Not Currently     Comment: rarely   • Drug use: No   • Sexual activity: Yes     Partners: Male     Birth control/protection: 
Injection   Other Topics Concern   • Not on file   Social History Narrative    She lives at home with her parents. They do not have any pets. She does try to walk for exercise.     Social Determinants of Health     Financial Resource Strain: Not on file   Food Insecurity: Not on file   Transportation Needs: Not on file   Physical Activity: Not on file   Stress: Not on file   Social Connections: Not on file   Intimate Partner Violence: Not At Risk (4/18/2023)    Intimate Partner Violence    • Social Determinants: Intimate Partner Violence Past Fear: No    • Social Determinants: Intimate Partner Violence Current Fear: No         Physical Exam:  Visit Vitals  /89   Pulse (!) 107   Resp 16   Ht 5' 5\" (1.651 m)   Wt 103.8 kg (228 lb 13.4 oz)   LMP  (LMP Unknown)   BMI 38.08 kg/m²     Constitutional:  Well developed, well nourished, afebrile.    In-Office Testing  Results for orders placed or performed in visit on 07/31/23   POCT Urine Dip Non-Auto   Result Value    POCT Color Orange    POCT Appearance Cloudy    POCT Glucose Urine Negative    POCT Bilirubin Small (A)    POCT Ketones Negative    POCT Specific Gravity 1.030    POCT Occult Blood Negative    POCT pH 5.0    POCT Protein 30 mg/dL (A)    POCT Urobilinogen 0.2    Urine Nitrite Negative    WBC (Leukocyte) Esterase POC Negative       Lab Results:         Assessment and Plan:    Microscopic hematuria, right flank pain- Urine culture from 04/03/2023 grew mixed juan but a urinalysis revealed 11-25 erythrocytes    - CT urogram from 07/10/2023 no stones, renal mass, filling defects.  Note made of cirrhotic liver and mild splenomegaly.      ** patient will follow-up later this fall as previously scheduled    Urinary urgency- Symptoms stable on Myrbetriq 25 mg once daily. She is content from a urinary standpoint. We discussed options.      -Continue Myrbetriq 25 mg once daily.      I had a brief counseling session with the patient concerning the diagnosis and 
treatment options.     I performed a brief review of the patient's medical records.     I reviewed the data with patient, including diagnosis, prognosis, and treatment.    Tests reviewed:  CT Urogram and Urinalysis    Tests ordered:  PVR and Urinalysis    me.      Ambrose Dillon Jr., MD [facility]  Department of Urology  [facility]  [phone]    
No complaints

## 2023-08-18 NOTE — PROGRESS NOTE ADULT - SUBJECTIVE AND OBJECTIVE BOX
Call the patient with result. Calcium returned to normal. Sodium little bellow th normal- reduce water intake by one glass a day. OVERNIGHT EVENTS: JERO     SUBJECTIVE:    Vital Signs Last 12 Hrs  T(F): 98.3 (02-10-19 @ 04:49), Max: 98.8 (02-09-19 @ 20:39)  HR: 77 (02-10-19 @ 04:49) (77 - 86)  BP: 158/85 (02-10-19 @ 04:49) (150/82 - 158/85)  RR: 18 (02-10-19 @ 04:49) (18 - 19)  SpO2: 98% (02-10-19 @ 04:49) (97% - 98%)  I&O's Summary    08 Feb 2019 07:01  -  09 Feb 2019 07:00  --------------------------------------------------------  IN: 960 mL / OUT: 0 mL / NET: 960 mL        PHYSICAL EXAM:  Constitutional: NAD, comfortable in bed.  HEENT: NC/AT, PERRLA, EOMI, no conjunctival pallor or scleral icterus, MMM  Neck: Supple, no JVD  Respiratory: Normal rate, rhythm, depth, effort. CTAB. No w/r/r.   Cardiovascular: RRR, normal S1 and S2, no m/r/g.   Gastrointestinal: +BS, soft NTND, no guarding or rebound tenderness, no palpable masses   Extremities: wwp; no cyanosis, clubbing or edema.   Vascular: Pulses equal and strong throughout.   Neurological: AAOx3, no CN deficits, strength and sensation intact throughout.   Skin: No gross skin abnormalities or rashes        LABS:                        9.4    12.03 )-----------( 391      ( 09 Feb 2019 08:33 )             30.8     02-09    141  |  110<H>  |  22  ----------------------------<  99  5.0   |  18<L>  |  1.39<H>    Ca    9.6      09 Feb 2019 08:33  Mg     1.9     02-09            RADIOLOGY & ADDITIONAL TESTS:    MEDICATIONS  (STANDING):  aspirin enteric coated 81 milliGRAM(s) Oral daily  atorvastatin 10 milliGRAM(s) Oral at bedtime  cefTRIAXone   IVPB 1 Gram(s) IV Intermittent every 24 hours  donepezil 5 milliGRAM(s) Oral at bedtime  ferrous    sulfate 325 milliGRAM(s) Oral daily  lacosamide 200 milliGRAM(s) Oral two times a day  levETIRAcetam 500 milliGRAM(s) Oral two times a day  memantine 10 milliGRAM(s) Oral daily  pantoprazole    Tablet 40 milliGRAM(s) Oral before breakfast  QUEtiapine 12.5 milliGRAM(s) Oral daily  sodium chloride 0.9%. 1000 milliLiter(s) (80 mL/Hr) IV Continuous <Continuous>    MEDICATIONS  (PRN): OVERNIGHT EVENTS: JERO     SUBJECTIVE: Patient reports she is feeling well. She has no shortness of breath, chest pain, or pain with urination. Reports having bowel movement yesterday.      Vital Signs Last 12 Hrs  T(F): 98.3 (02-10-19 @ 04:49), Max: 98.8 (02-09-19 @ 20:39)  HR: 77 (02-10-19 @ 04:49) (77 - 86)  BP: 158/85 (02-10-19 @ 04:49) (150/82 - 158/85)  RR: 18 (02-10-19 @ 04:49) (18 - 19)  SpO2: 98% (02-10-19 @ 04:49) (97% - 98%)  I&O's Summary    08 Feb 2019 07:01  -  09 Feb 2019 07:00  --------------------------------------------------------  IN: 960 mL / OUT: 0 mL / NET: 960 mL        PHYSICAL EXAM:  Constitutional: pleasantly confused female in NAD, comfortable in bed.  HEENT: NC/AT, PERRLA, EOMI, no conjunctival pallor or scleral icterus, MMM  Neck: Supple, no JVD  Respiratory: Normal rate, rhythm, depth, effort. CTAB. No w/r/r.   Cardiovascular: RRR, normal S1 and S2, no m/r/g.   Gastrointestinal: +BS, soft NTND, no guarding or rebound tenderness, no palpable masses   Extremities: wwp; no cyanosis, clubbing or edema.   Vascular: Pulses equal and strong throughout.   Neurological: AAOx1 (self), no CN deficits, strength and sensation intact throughout.   Skin: No gross skin abnormalities or rashes        LABS:                        9.4    12.03 )-----------( 391      ( 09 Feb 2019 08:33 )             30.8     02-09    141  |  110<H>  |  22  ----------------------------<  99  5.0   |  18<L>  |  1.39<H>    Ca    9.6      09 Feb 2019 08:33  Mg     1.9     02-09            RADIOLOGY & ADDITIONAL TESTS:    MEDICATIONS  (STANDING):  aspirin enteric coated 81 milliGRAM(s) Oral daily  atorvastatin 10 milliGRAM(s) Oral at bedtime  cefTRIAXone   IVPB 1 Gram(s) IV Intermittent every 24 hours  donepezil 5 milliGRAM(s) Oral at bedtime  ferrous    sulfate 325 milliGRAM(s) Oral daily  lacosamide 200 milliGRAM(s) Oral two times a day  levETIRAcetam 500 milliGRAM(s) Oral two times a day  memantine 10 milliGRAM(s) Oral daily  pantoprazole    Tablet 40 milliGRAM(s) Oral before breakfast  QUEtiapine 12.5 milliGRAM(s) Oral daily  sodium chloride 0.9%. 1000 milliLiter(s) (80 mL/Hr) IV Continuous <Continuous>    MEDICATIONS  (PRN):

## 2023-08-28 NOTE — BEHAVIORAL HEALTH ASSESSMENT NOTE - DIFFERENTIAL
Patient baseline mental status depressive disorder  neurocognitive disorder  r/o superimposed delirium

## 2023-08-30 NOTE — CONSULT NOTE ADULT - PROBLEM SELECTOR RECOMMENDATION 9
2/2 acute sepsis, in setting of underlying dementia.   No history of breakthrough seizures.   Mental status has improved since initial admission per daughter.   Patient is disoriented to person, time, place at baseline per daughter- currently able to responsive to questioning in sentences.
IV intact

## 2023-10-26 NOTE — PATIENT PROFILE ADULT - NSPROPTRIGHTSUPPORTPERSON_GEN_A_NUR
same name as above Bexarotene Pregnancy And Lactation Text: This medication is Pregnancy Category X and should not be given to women who are pregnant or may become pregnant. This medication should not be used if you are breast feeding.

## 2023-12-15 PROBLEM — Z86.79 PERSONAL HISTORY OF OTHER DISEASES OF THE CIRCULATORY SYSTEM: Chronic | Status: ACTIVE | Noted: 2019-05-09

## 2023-12-15 PROBLEM — F32.9 MAJOR DEPRESSIVE DISORDER, SINGLE EPISODE, UNSPECIFIED: Chronic | Status: ACTIVE | Noted: 2019-05-09

## 2023-12-15 PROBLEM — K21.9 GASTRO-ESOPHAGEAL REFLUX DISEASE WITHOUT ESOPHAGITIS: Chronic | Status: ACTIVE | Noted: 2019-05-09

## 2023-12-15 PROBLEM — M10.9 GOUT, UNSPECIFIED: Chronic | Status: ACTIVE | Noted: 2019-05-09

## 2023-12-15 PROBLEM — E11.9 TYPE 2 DIABETES MELLITUS WITHOUT COMPLICATIONS: Chronic | Status: ACTIVE | Noted: 2019-05-09

## 2023-12-15 PROBLEM — I48.91 UNSPECIFIED ATRIAL FIBRILLATION: Chronic | Status: ACTIVE | Noted: 2019-05-09

## 2023-12-17 VITALS
OXYGEN SATURATION: 98 % | DIASTOLIC BLOOD PRESSURE: 74 MMHG | RESPIRATION RATE: 18 BRPM | HEART RATE: 76 BPM | TEMPERATURE: 97 F | SYSTOLIC BLOOD PRESSURE: 132 MMHG

## 2023-12-17 LAB
ANION GAP SERPL CALC-SCNC: 10 MMOL/L — SIGNIFICANT CHANGE UP (ref 5–17)
ANION GAP SERPL CALC-SCNC: 10 MMOL/L — SIGNIFICANT CHANGE UP (ref 5–17)
BASOPHILS # BLD AUTO: 0.04 K/UL — SIGNIFICANT CHANGE UP (ref 0–0.2)
BASOPHILS # BLD AUTO: 0.04 K/UL — SIGNIFICANT CHANGE UP (ref 0–0.2)
BASOPHILS NFR BLD AUTO: 0.5 % — SIGNIFICANT CHANGE UP (ref 0–2)
BASOPHILS NFR BLD AUTO: 0.5 % — SIGNIFICANT CHANGE UP (ref 0–2)
BUN SERPL-MCNC: 28 MG/DL — HIGH (ref 7–23)
BUN SERPL-MCNC: 28 MG/DL — HIGH (ref 7–23)
CALCIUM SERPL-MCNC: 9.5 MG/DL — SIGNIFICANT CHANGE UP (ref 8.4–10.5)
CALCIUM SERPL-MCNC: 9.5 MG/DL — SIGNIFICANT CHANGE UP (ref 8.4–10.5)
CHLORIDE SERPL-SCNC: 104 MMOL/L — SIGNIFICANT CHANGE UP (ref 96–108)
CHLORIDE SERPL-SCNC: 104 MMOL/L — SIGNIFICANT CHANGE UP (ref 96–108)
CO2 SERPL-SCNC: 20 MMOL/L — LOW (ref 22–31)
CO2 SERPL-SCNC: 20 MMOL/L — LOW (ref 22–31)
CREAT SERPL-MCNC: 2.82 MG/DL — HIGH (ref 0.5–1.3)
CREAT SERPL-MCNC: 2.82 MG/DL — HIGH (ref 0.5–1.3)
EGFR: 17 ML/MIN/1.73M2 — LOW
EGFR: 17 ML/MIN/1.73M2 — LOW
EOSINOPHIL # BLD AUTO: 0.22 K/UL — SIGNIFICANT CHANGE UP (ref 0–0.5)
EOSINOPHIL # BLD AUTO: 0.22 K/UL — SIGNIFICANT CHANGE UP (ref 0–0.5)
EOSINOPHIL NFR BLD AUTO: 2.9 % — SIGNIFICANT CHANGE UP (ref 0–6)
EOSINOPHIL NFR BLD AUTO: 2.9 % — SIGNIFICANT CHANGE UP (ref 0–6)
GLUCOSE SERPL-MCNC: 92 MG/DL — SIGNIFICANT CHANGE UP (ref 70–99)
GLUCOSE SERPL-MCNC: 92 MG/DL — SIGNIFICANT CHANGE UP (ref 70–99)
HCT VFR BLD CALC: 25.4 % — LOW (ref 34.5–45)
HCT VFR BLD CALC: 25.4 % — LOW (ref 34.5–45)
HGB BLD-MCNC: 7.7 G/DL — LOW (ref 11.5–15.5)
HGB BLD-MCNC: 7.7 G/DL — LOW (ref 11.5–15.5)
IMM GRANULOCYTES NFR BLD AUTO: 0.4 % — SIGNIFICANT CHANGE UP (ref 0–0.9)
IMM GRANULOCYTES NFR BLD AUTO: 0.4 % — SIGNIFICANT CHANGE UP (ref 0–0.9)
LYMPHOCYTES # BLD AUTO: 1.35 K/UL — SIGNIFICANT CHANGE UP (ref 1–3.3)
LYMPHOCYTES # BLD AUTO: 1.35 K/UL — SIGNIFICANT CHANGE UP (ref 1–3.3)
LYMPHOCYTES # BLD AUTO: 17.8 % — SIGNIFICANT CHANGE UP (ref 13–44)
LYMPHOCYTES # BLD AUTO: 17.8 % — SIGNIFICANT CHANGE UP (ref 13–44)
MAGNESIUM SERPL-MCNC: 1.6 MG/DL — SIGNIFICANT CHANGE UP (ref 1.6–2.6)
MAGNESIUM SERPL-MCNC: 1.6 MG/DL — SIGNIFICANT CHANGE UP (ref 1.6–2.6)
MCHC RBC-ENTMCNC: 29.4 PG — SIGNIFICANT CHANGE UP (ref 27–34)
MCHC RBC-ENTMCNC: 29.4 PG — SIGNIFICANT CHANGE UP (ref 27–34)
MCHC RBC-ENTMCNC: 30.3 GM/DL — LOW (ref 32–36)
MCHC RBC-ENTMCNC: 30.3 GM/DL — LOW (ref 32–36)
MCV RBC AUTO: 96.9 FL — SIGNIFICANT CHANGE UP (ref 80–100)
MCV RBC AUTO: 96.9 FL — SIGNIFICANT CHANGE UP (ref 80–100)
MONOCYTES # BLD AUTO: 0.54 K/UL — SIGNIFICANT CHANGE UP (ref 0–0.9)
MONOCYTES # BLD AUTO: 0.54 K/UL — SIGNIFICANT CHANGE UP (ref 0–0.9)
MONOCYTES NFR BLD AUTO: 7.1 % — SIGNIFICANT CHANGE UP (ref 2–14)
MONOCYTES NFR BLD AUTO: 7.1 % — SIGNIFICANT CHANGE UP (ref 2–14)
NEUTROPHILS # BLD AUTO: 5.41 K/UL — SIGNIFICANT CHANGE UP (ref 1.8–7.4)
NEUTROPHILS # BLD AUTO: 5.41 K/UL — SIGNIFICANT CHANGE UP (ref 1.8–7.4)
NEUTROPHILS NFR BLD AUTO: 71.3 % — SIGNIFICANT CHANGE UP (ref 43–77)
NEUTROPHILS NFR BLD AUTO: 71.3 % — SIGNIFICANT CHANGE UP (ref 43–77)
NRBC # BLD: 0 /100 WBCS — SIGNIFICANT CHANGE UP (ref 0–0)
NRBC # BLD: 0 /100 WBCS — SIGNIFICANT CHANGE UP (ref 0–0)
PLATELET # BLD AUTO: 234 K/UL — SIGNIFICANT CHANGE UP (ref 150–400)
PLATELET # BLD AUTO: 234 K/UL — SIGNIFICANT CHANGE UP (ref 150–400)
POTASSIUM SERPL-MCNC: 4.9 MMOL/L — SIGNIFICANT CHANGE UP (ref 3.5–5.3)
POTASSIUM SERPL-MCNC: 4.9 MMOL/L — SIGNIFICANT CHANGE UP (ref 3.5–5.3)
POTASSIUM SERPL-SCNC: 4.9 MMOL/L — SIGNIFICANT CHANGE UP (ref 3.5–5.3)
POTASSIUM SERPL-SCNC: 4.9 MMOL/L — SIGNIFICANT CHANGE UP (ref 3.5–5.3)
RBC # BLD: 2.62 M/UL — LOW (ref 3.8–5.2)
RBC # BLD: 2.62 M/UL — LOW (ref 3.8–5.2)
RBC # FLD: 15.9 % — HIGH (ref 10.3–14.5)
RBC # FLD: 15.9 % — HIGH (ref 10.3–14.5)
SODIUM SERPL-SCNC: 134 MMOL/L — LOW (ref 135–145)
SODIUM SERPL-SCNC: 134 MMOL/L — LOW (ref 135–145)
WBC # BLD: 7.59 K/UL — SIGNIFICANT CHANGE UP (ref 3.8–10.5)
WBC # BLD: 7.59 K/UL — SIGNIFICANT CHANGE UP (ref 3.8–10.5)
WBC # FLD AUTO: 7.59 K/UL — SIGNIFICANT CHANGE UP (ref 3.8–10.5)
WBC # FLD AUTO: 7.59 K/UL — SIGNIFICANT CHANGE UP (ref 3.8–10.5)

## 2023-12-17 PROCEDURE — 99285 EMERGENCY DEPT VISIT HI MDM: CPT

## 2023-12-17 PROCEDURE — 93010 ELECTROCARDIOGRAM REPORT: CPT

## 2023-12-17 RX ORDER — SODIUM CHLORIDE 9 MG/ML
1000 INJECTION INTRAMUSCULAR; INTRAVENOUS; SUBCUTANEOUS ONCE
Refills: 0 | Status: COMPLETED | OUTPATIENT
Start: 2023-12-17 | End: 2023-12-17

## 2023-12-17 RX ADMIN — SODIUM CHLORIDE 1000 MILLILITER(S): 9 INJECTION INTRAMUSCULAR; INTRAVENOUS; SUBCUTANEOUS at 23:46

## 2023-12-17 NOTE — ED ADULT NURSE NOTE - NSFALLHARMRISKINTERV_ED_ALL_ED

## 2023-12-17 NOTE — ED ADULT TRIAGE NOTE - ARRIVAL INFO ADDITIONAL COMMENTS
daughter states pt was dx with c diff on 11/28 and was admitted at OSH.   over last 2 days diarrhea had returned and pt is much weaker.    of note pt is also currently being treated for UTI.   hx of dementia

## 2023-12-17 NOTE — ED ADULT NURSE NOTE - OBJECTIVE STATEMENT
Pt arrived w/ daughter. Pt has dementia. Pt arrived d/t diarrhea recently dx w/ c. diff. Pt also recently had UTI w/ antibiotic course given. Per daughter pt has been having increased stools and poor PO intake. Pt denies pain. Pt denies cp, n, v, lightheadedness, dizziness, sob, fevers, chills. No blood in stool noted. Stool liquid and green colored.

## 2023-12-18 ENCOUNTER — INPATIENT (INPATIENT)
Facility: HOSPITAL | Age: 78
LOS: 0 days | Discharge: HOME | DRG: 372 | End: 2023-12-19
Attending: INTERNAL MEDICINE | Admitting: GENERAL ACUTE CARE HOSPITAL
Payer: COMMERCIAL

## 2023-12-18 DIAGNOSIS — A49.8 OTHER BACTERIAL INFECTIONS OF UNSPECIFIED SITE: ICD-10-CM

## 2023-12-18 DIAGNOSIS — N17.9 ACUTE KIDNEY FAILURE, UNSPECIFIED: ICD-10-CM

## 2023-12-18 DIAGNOSIS — E11.9 TYPE 2 DIABETES MELLITUS WITHOUT COMPLICATIONS: ICD-10-CM

## 2023-12-18 DIAGNOSIS — I25.10 ATHEROSCLEROTIC HEART DISEASE OF NATIVE CORONARY ARTERY WITHOUT ANGINA PECTORIS: ICD-10-CM

## 2023-12-18 DIAGNOSIS — R56.9 UNSPECIFIED CONVULSIONS: ICD-10-CM

## 2023-12-18 DIAGNOSIS — E78.5 HYPERLIPIDEMIA, UNSPECIFIED: ICD-10-CM

## 2023-12-18 DIAGNOSIS — N18.9 CHRONIC KIDNEY DISEASE, UNSPECIFIED: ICD-10-CM

## 2023-12-18 DIAGNOSIS — Z29.9 ENCOUNTER FOR PROPHYLACTIC MEASURES, UNSPECIFIED: ICD-10-CM

## 2023-12-18 DIAGNOSIS — D63.8 ANEMIA IN OTHER CHRONIC DISEASES CLASSIFIED ELSEWHERE: ICD-10-CM

## 2023-12-18 DIAGNOSIS — Z87.440 PERSONAL HISTORY OF URINARY (TRACT) INFECTIONS: ICD-10-CM

## 2023-12-18 DIAGNOSIS — F03.90 UNSPECIFIED DEMENTIA WITHOUT BEHAVIORAL DISTURBANCE: ICD-10-CM

## 2023-12-18 DIAGNOSIS — Z90.3 ACQUIRED ABSENCE OF STOMACH [PART OF]: Chronic | ICD-10-CM

## 2023-12-18 DIAGNOSIS — I10 ESSENTIAL (PRIMARY) HYPERTENSION: ICD-10-CM

## 2023-12-18 DIAGNOSIS — Z98.890 OTHER SPECIFIED POSTPROCEDURAL STATES: Chronic | ICD-10-CM

## 2023-12-18 LAB
A1C WITH ESTIMATED AVERAGE GLUCOSE RESULT: 5.4 % — SIGNIFICANT CHANGE UP (ref 4–5.6)
A1C WITH ESTIMATED AVERAGE GLUCOSE RESULT: 5.4 % — SIGNIFICANT CHANGE UP (ref 4–5.6)
ALBUMIN SERPL ELPH-MCNC: 3 G/DL — LOW (ref 3.3–5)
ALBUMIN SERPL ELPH-MCNC: 3 G/DL — LOW (ref 3.3–5)
ALP SERPL-CCNC: 122 U/L — HIGH (ref 40–120)
ALP SERPL-CCNC: 122 U/L — HIGH (ref 40–120)
ALT FLD-CCNC: 10 U/L — SIGNIFICANT CHANGE UP (ref 10–45)
ALT FLD-CCNC: 10 U/L — SIGNIFICANT CHANGE UP (ref 10–45)
ANION GAP SERPL CALC-SCNC: 9 MMOL/L — SIGNIFICANT CHANGE UP (ref 5–17)
ANION GAP SERPL CALC-SCNC: 9 MMOL/L — SIGNIFICANT CHANGE UP (ref 5–17)
APTT BLD: 28.2 SEC — SIGNIFICANT CHANGE UP (ref 24.5–35.6)
APTT BLD: 28.2 SEC — SIGNIFICANT CHANGE UP (ref 24.5–35.6)
AST SERPL-CCNC: 14 U/L — SIGNIFICANT CHANGE UP (ref 10–40)
AST SERPL-CCNC: 14 U/L — SIGNIFICANT CHANGE UP (ref 10–40)
BASOPHILS # BLD AUTO: 0.03 K/UL — SIGNIFICANT CHANGE UP (ref 0–0.2)
BASOPHILS # BLD AUTO: 0.03 K/UL — SIGNIFICANT CHANGE UP (ref 0–0.2)
BASOPHILS NFR BLD AUTO: 0.5 % — SIGNIFICANT CHANGE UP (ref 0–2)
BASOPHILS NFR BLD AUTO: 0.5 % — SIGNIFICANT CHANGE UP (ref 0–2)
BILIRUB SERPL-MCNC: 0.3 MG/DL — SIGNIFICANT CHANGE UP (ref 0.2–1.2)
BILIRUB SERPL-MCNC: 0.3 MG/DL — SIGNIFICANT CHANGE UP (ref 0.2–1.2)
BLD GP AB SCN SERPL QL: NEGATIVE — SIGNIFICANT CHANGE UP
BLD GP AB SCN SERPL QL: NEGATIVE — SIGNIFICANT CHANGE UP
BUN SERPL-MCNC: 26 MG/DL — HIGH (ref 7–23)
BUN SERPL-MCNC: 26 MG/DL — HIGH (ref 7–23)
C DIFF BY PCR RESULT: NEGATIVE — SIGNIFICANT CHANGE UP
C DIFF BY PCR RESULT: NEGATIVE — SIGNIFICANT CHANGE UP
C DIFF GDH STL QL: SIGNIFICANT CHANGE UP
CALCIUM SERPL-MCNC: 9.1 MG/DL — SIGNIFICANT CHANGE UP (ref 8.4–10.5)
CALCIUM SERPL-MCNC: 9.1 MG/DL — SIGNIFICANT CHANGE UP (ref 8.4–10.5)
CHLORIDE SERPL-SCNC: 112 MMOL/L — HIGH (ref 96–108)
CHLORIDE SERPL-SCNC: 112 MMOL/L — HIGH (ref 96–108)
CHOLEST SERPL-MCNC: 117 MG/DL — SIGNIFICANT CHANGE UP
CHOLEST SERPL-MCNC: 117 MG/DL — SIGNIFICANT CHANGE UP
CO2 SERPL-SCNC: 17 MMOL/L — LOW (ref 22–31)
CO2 SERPL-SCNC: 17 MMOL/L — LOW (ref 22–31)
CREAT SERPL-MCNC: 2.59 MG/DL — HIGH (ref 0.5–1.3)
CREAT SERPL-MCNC: 2.59 MG/DL — HIGH (ref 0.5–1.3)
EGFR: 18 ML/MIN/1.73M2 — LOW
EGFR: 18 ML/MIN/1.73M2 — LOW
EOSINOPHIL # BLD AUTO: 0.27 K/UL — SIGNIFICANT CHANGE UP (ref 0–0.5)
EOSINOPHIL # BLD AUTO: 0.27 K/UL — SIGNIFICANT CHANGE UP (ref 0–0.5)
EOSINOPHIL NFR BLD AUTO: 4.2 % — SIGNIFICANT CHANGE UP (ref 0–6)
EOSINOPHIL NFR BLD AUTO: 4.2 % — SIGNIFICANT CHANGE UP (ref 0–6)
ESTIMATED AVERAGE GLUCOSE: 108 MG/DL — SIGNIFICANT CHANGE UP (ref 68–114)
ESTIMATED AVERAGE GLUCOSE: 108 MG/DL — SIGNIFICANT CHANGE UP (ref 68–114)
FERRITIN SERPL-MCNC: 343 NG/ML — HIGH (ref 13–330)
FERRITIN SERPL-MCNC: 343 NG/ML — HIGH (ref 13–330)
FLUAV AG NPH QL: SIGNIFICANT CHANGE UP
FLUAV AG NPH QL: SIGNIFICANT CHANGE UP
FLUBV AG NPH QL: SIGNIFICANT CHANGE UP
FLUBV AG NPH QL: SIGNIFICANT CHANGE UP
GI PCR PANEL: SIGNIFICANT CHANGE UP
GI PCR PANEL: SIGNIFICANT CHANGE UP
GLUCOSE SERPL-MCNC: 81 MG/DL — SIGNIFICANT CHANGE UP (ref 70–99)
GLUCOSE SERPL-MCNC: 81 MG/DL — SIGNIFICANT CHANGE UP (ref 70–99)
HCT VFR BLD CALC: 26.3 % — LOW (ref 34.5–45)
HCT VFR BLD CALC: 26.3 % — LOW (ref 34.5–45)
HDLC SERPL-MCNC: 34 MG/DL — LOW
HDLC SERPL-MCNC: 34 MG/DL — LOW
HGB BLD-MCNC: 7.6 G/DL — LOW (ref 11.5–15.5)
HGB BLD-MCNC: 7.6 G/DL — LOW (ref 11.5–15.5)
IMM GRANULOCYTES NFR BLD AUTO: 0.6 % — SIGNIFICANT CHANGE UP (ref 0–0.9)
IMM GRANULOCYTES NFR BLD AUTO: 0.6 % — SIGNIFICANT CHANGE UP (ref 0–0.9)
INR BLD: 1.11 — SIGNIFICANT CHANGE UP (ref 0.85–1.18)
INR BLD: 1.11 — SIGNIFICANT CHANGE UP (ref 0.85–1.18)
IRON SATN MFR SERPL: 24 % — SIGNIFICANT CHANGE UP (ref 14–50)
IRON SATN MFR SERPL: 24 % — SIGNIFICANT CHANGE UP (ref 14–50)
IRON SATN MFR SERPL: 43 UG/DL — SIGNIFICANT CHANGE UP (ref 30–160)
IRON SATN MFR SERPL: 43 UG/DL — SIGNIFICANT CHANGE UP (ref 30–160)
LIPID PNL WITH DIRECT LDL SERPL: 41 MG/DL — SIGNIFICANT CHANGE UP
LIPID PNL WITH DIRECT LDL SERPL: 41 MG/DL — SIGNIFICANT CHANGE UP
LYMPHOCYTES # BLD AUTO: 0.66 K/UL — LOW (ref 1–3.3)
LYMPHOCYTES # BLD AUTO: 0.66 K/UL — LOW (ref 1–3.3)
LYMPHOCYTES # BLD AUTO: 10.3 % — LOW (ref 13–44)
LYMPHOCYTES # BLD AUTO: 10.3 % — LOW (ref 13–44)
MAGNESIUM SERPL-MCNC: 1.9 MG/DL — SIGNIFICANT CHANGE UP (ref 1.6–2.6)
MAGNESIUM SERPL-MCNC: 1.9 MG/DL — SIGNIFICANT CHANGE UP (ref 1.6–2.6)
MCHC RBC-ENTMCNC: 28.9 GM/DL — LOW (ref 32–36)
MCHC RBC-ENTMCNC: 28.9 GM/DL — LOW (ref 32–36)
MCHC RBC-ENTMCNC: 29.1 PG — SIGNIFICANT CHANGE UP (ref 27–34)
MCHC RBC-ENTMCNC: 29.1 PG — SIGNIFICANT CHANGE UP (ref 27–34)
MCV RBC AUTO: 100.8 FL — HIGH (ref 80–100)
MCV RBC AUTO: 100.8 FL — HIGH (ref 80–100)
MONOCYTES # BLD AUTO: 0.39 K/UL — SIGNIFICANT CHANGE UP (ref 0–0.9)
MONOCYTES # BLD AUTO: 0.39 K/UL — SIGNIFICANT CHANGE UP (ref 0–0.9)
MONOCYTES NFR BLD AUTO: 6.1 % — SIGNIFICANT CHANGE UP (ref 2–14)
MONOCYTES NFR BLD AUTO: 6.1 % — SIGNIFICANT CHANGE UP (ref 2–14)
NEUTROPHILS # BLD AUTO: 4.99 K/UL — SIGNIFICANT CHANGE UP (ref 1.8–7.4)
NEUTROPHILS # BLD AUTO: 4.99 K/UL — SIGNIFICANT CHANGE UP (ref 1.8–7.4)
NEUTROPHILS NFR BLD AUTO: 78.3 % — HIGH (ref 43–77)
NEUTROPHILS NFR BLD AUTO: 78.3 % — HIGH (ref 43–77)
NON HDL CHOLESTEROL: 83 MG/DL — SIGNIFICANT CHANGE UP
NON HDL CHOLESTEROL: 83 MG/DL — SIGNIFICANT CHANGE UP
NRBC # BLD: 0 /100 WBCS — SIGNIFICANT CHANGE UP (ref 0–0)
NRBC # BLD: 0 /100 WBCS — SIGNIFICANT CHANGE UP (ref 0–0)
PHOSPHATE SERPL-MCNC: 2.8 MG/DL — SIGNIFICANT CHANGE UP (ref 2.5–4.5)
PHOSPHATE SERPL-MCNC: 2.8 MG/DL — SIGNIFICANT CHANGE UP (ref 2.5–4.5)
PLATELET # BLD AUTO: 225 K/UL — SIGNIFICANT CHANGE UP (ref 150–400)
PLATELET # BLD AUTO: 225 K/UL — SIGNIFICANT CHANGE UP (ref 150–400)
POTASSIUM SERPL-MCNC: 4.4 MMOL/L — SIGNIFICANT CHANGE UP (ref 3.5–5.3)
POTASSIUM SERPL-MCNC: 4.4 MMOL/L — SIGNIFICANT CHANGE UP (ref 3.5–5.3)
POTASSIUM SERPL-SCNC: 4.4 MMOL/L — SIGNIFICANT CHANGE UP (ref 3.5–5.3)
POTASSIUM SERPL-SCNC: 4.4 MMOL/L — SIGNIFICANT CHANGE UP (ref 3.5–5.3)
PROT SERPL-MCNC: 5.8 G/DL — LOW (ref 6–8.3)
PROT SERPL-MCNC: 5.8 G/DL — LOW (ref 6–8.3)
PROTHROM AB SERPL-ACNC: 12.6 SEC — SIGNIFICANT CHANGE UP (ref 9.5–13)
PROTHROM AB SERPL-ACNC: 12.6 SEC — SIGNIFICANT CHANGE UP (ref 9.5–13)
RBC # BLD: 2.61 M/UL — LOW (ref 3.8–5.2)
RBC # BLD: 2.61 M/UL — LOW (ref 3.8–5.2)
RBC # FLD: 15.6 % — HIGH (ref 10.3–14.5)
RBC # FLD: 15.6 % — HIGH (ref 10.3–14.5)
RH IG SCN BLD-IMP: POSITIVE — SIGNIFICANT CHANGE UP
RH IG SCN BLD-IMP: POSITIVE — SIGNIFICANT CHANGE UP
RSV RNA NPH QL NAA+NON-PROBE: SIGNIFICANT CHANGE UP
RSV RNA NPH QL NAA+NON-PROBE: SIGNIFICANT CHANGE UP
SARS-COV-2 RNA SPEC QL NAA+PROBE: SIGNIFICANT CHANGE UP
SARS-COV-2 RNA SPEC QL NAA+PROBE: SIGNIFICANT CHANGE UP
SODIUM SERPL-SCNC: 138 MMOL/L — SIGNIFICANT CHANGE UP (ref 135–145)
SODIUM SERPL-SCNC: 138 MMOL/L — SIGNIFICANT CHANGE UP (ref 135–145)
TIBC SERPL-MCNC: 179 UG/DL — LOW (ref 220–430)
TIBC SERPL-MCNC: 179 UG/DL — LOW (ref 220–430)
TRANSFERRIN SERPL-MCNC: 142 MG/DL — LOW (ref 200–360)
TRANSFERRIN SERPL-MCNC: 142 MG/DL — LOW (ref 200–360)
TRIGL SERPL-MCNC: 209 MG/DL — HIGH
TRIGL SERPL-MCNC: 209 MG/DL — HIGH
UIBC SERPL-MCNC: 136 UG/DL — SIGNIFICANT CHANGE UP (ref 110–370)
UIBC SERPL-MCNC: 136 UG/DL — SIGNIFICANT CHANGE UP (ref 110–370)
WBC # BLD: 6.38 K/UL — SIGNIFICANT CHANGE UP (ref 3.8–10.5)
WBC # BLD: 6.38 K/UL — SIGNIFICANT CHANGE UP (ref 3.8–10.5)
WBC # FLD AUTO: 6.38 K/UL — SIGNIFICANT CHANGE UP (ref 3.8–10.5)
WBC # FLD AUTO: 6.38 K/UL — SIGNIFICANT CHANGE UP (ref 3.8–10.5)

## 2023-12-18 PROCEDURE — 99223 1ST HOSP IP/OBS HIGH 75: CPT | Mod: GC,AI

## 2023-12-18 RX ORDER — ASCORBIC ACID 60 MG
1 TABLET,CHEWABLE ORAL
Qty: 0 | Refills: 0 | DISCHARGE

## 2023-12-18 RX ORDER — PANTOPRAZOLE SODIUM 20 MG/1
40 TABLET, DELAYED RELEASE ORAL EVERY 12 HOURS
Refills: 0 | Status: DISCONTINUED | OUTPATIENT
Start: 2023-12-18 | End: 2023-12-18

## 2023-12-18 RX ORDER — MEMANTINE HYDROCHLORIDE 10 MG/1
5 TABLET ORAL EVERY 12 HOURS
Refills: 0 | Status: DISCONTINUED | OUTPATIENT
Start: 2023-12-18 | End: 2023-12-19

## 2023-12-18 RX ORDER — METOPROLOL TARTRATE 50 MG
1 TABLET ORAL
Refills: 0 | DISCHARGE

## 2023-12-18 RX ORDER — FERROUS SULFATE 325(65) MG
1 TABLET ORAL
Qty: 0 | Refills: 0 | DISCHARGE

## 2023-12-18 RX ORDER — PREGABALIN 225 MG/1
0.5 CAPSULE ORAL
Refills: 0 | DISCHARGE

## 2023-12-18 RX ORDER — MAGNESIUM SULFATE 500 MG/ML
1 VIAL (ML) INJECTION ONCE
Refills: 0 | Status: COMPLETED | OUTPATIENT
Start: 2023-12-18 | End: 2023-12-18

## 2023-12-18 RX ORDER — MEMANTINE HYDROCHLORIDE 10 MG/1
5 TABLET ORAL EVERY 24 HOURS
Refills: 0 | Status: DISCONTINUED | OUTPATIENT
Start: 2023-12-18 | End: 2023-12-18

## 2023-12-18 RX ORDER — TRAZODONE HCL 50 MG
1 TABLET ORAL
Refills: 0 | DISCHARGE

## 2023-12-18 RX ORDER — MEMANTINE HYDROCHLORIDE 10 MG/1
10 TABLET ORAL EVERY 24 HOURS
Refills: 0 | Status: DISCONTINUED | OUTPATIENT
Start: 2023-12-18 | End: 2023-12-18

## 2023-12-18 RX ORDER — HEPARIN SODIUM 5000 [USP'U]/ML
5000 INJECTION INTRAVENOUS; SUBCUTANEOUS EVERY 8 HOURS
Refills: 0 | Status: DISCONTINUED | OUTPATIENT
Start: 2023-12-18 | End: 2023-12-19

## 2023-12-18 RX ORDER — ASPIRIN/CALCIUM CARB/MAGNESIUM 324 MG
81 TABLET ORAL EVERY 24 HOURS
Refills: 0 | Status: DISCONTINUED | OUTPATIENT
Start: 2023-12-18 | End: 2023-12-19

## 2023-12-18 RX ORDER — ATORVASTATIN CALCIUM 80 MG/1
40 TABLET, FILM COATED ORAL AT BEDTIME
Refills: 0 | Status: DISCONTINUED | OUTPATIENT
Start: 2023-12-18 | End: 2023-12-19

## 2023-12-18 RX ORDER — PANTOPRAZOLE SODIUM 20 MG/1
40 TABLET, DELAYED RELEASE ORAL EVERY 12 HOURS
Refills: 0 | Status: DISCONTINUED | OUTPATIENT
Start: 2023-12-18 | End: 2023-12-19

## 2023-12-18 RX ORDER — PREGABALIN 225 MG/1
500 CAPSULE ORAL EVERY 24 HOURS
Refills: 0 | Status: DISCONTINUED | OUTPATIENT
Start: 2023-12-18 | End: 2023-12-19

## 2023-12-18 RX ORDER — TRAZODONE HCL 50 MG
50 TABLET ORAL AT BEDTIME
Refills: 0 | Status: DISCONTINUED | OUTPATIENT
Start: 2023-12-18 | End: 2023-12-19

## 2023-12-18 RX ORDER — LEVETIRACETAM 250 MG/1
500 TABLET, FILM COATED ORAL EVERY 12 HOURS
Refills: 0 | Status: DISCONTINUED | OUTPATIENT
Start: 2023-12-18 | End: 2023-12-19

## 2023-12-18 RX ORDER — METOPROLOL TARTRATE 50 MG
25 TABLET ORAL EVERY 24 HOURS
Refills: 0 | Status: DISCONTINUED | OUTPATIENT
Start: 2023-12-18 | End: 2023-12-19

## 2023-12-18 RX ORDER — MIRTAZAPINE 45 MG/1
1 TABLET, ORALLY DISINTEGRATING ORAL
Qty: 0 | Refills: 0 | DISCHARGE

## 2023-12-18 RX ORDER — LACOSAMIDE 50 MG/1
1 TABLET ORAL
Qty: 0 | Refills: 0 | DISCHARGE

## 2023-12-18 RX ORDER — SODIUM CHLORIDE 9 MG/ML
1000 INJECTION INTRAMUSCULAR; INTRAVENOUS; SUBCUTANEOUS
Refills: 0 | Status: DISCONTINUED | OUTPATIENT
Start: 2023-12-18 | End: 2023-12-19

## 2023-12-18 RX ORDER — PANTOPRAZOLE SODIUM 20 MG/1
40 TABLET, DELAYED RELEASE ORAL
Refills: 0 | Status: DISCONTINUED | OUTPATIENT
Start: 2023-12-18 | End: 2023-12-18

## 2023-12-18 RX ORDER — ALLOPURINOL 300 MG
100 TABLET ORAL EVERY 24 HOURS
Refills: 0 | Status: DISCONTINUED | OUTPATIENT
Start: 2023-12-18 | End: 2023-12-19

## 2023-12-18 RX ORDER — FIDAXOMICIN 200 MG/5ML
200 GRANULE, FOR SUSPENSION ORAL
Refills: 0 | Status: DISCONTINUED | OUTPATIENT
Start: 2023-12-18 | End: 2023-12-19

## 2023-12-18 RX ORDER — ALPRAZOLAM 0.25 MG
1 TABLET ORAL
Qty: 0 | Refills: 0 | DISCHARGE

## 2023-12-18 RX ORDER — SODIUM BICARBONATE 1 MEQ/ML
650 SYRINGE (ML) INTRAVENOUS EVERY 12 HOURS
Refills: 0 | Status: DISCONTINUED | OUTPATIENT
Start: 2023-12-18 | End: 2023-12-19

## 2023-12-18 RX ORDER — BACLOFEN 100 %
1 POWDER (GRAM) MISCELLANEOUS
Qty: 0 | Refills: 0 | DISCHARGE

## 2023-12-18 RX ORDER — ESCITALOPRAM OXALATE 10 MG/1
1 TABLET, FILM COATED ORAL
Qty: 0 | Refills: 0 | DISCHARGE

## 2023-12-18 RX ORDER — DONEPEZIL HYDROCHLORIDE 10 MG/1
10 TABLET, FILM COATED ORAL AT BEDTIME
Refills: 0 | Status: DISCONTINUED | OUTPATIENT
Start: 2023-12-18 | End: 2023-12-19

## 2023-12-18 RX ADMIN — FIDAXOMICIN 200 MILLIGRAM(S): 200 GRANULE, FOR SUSPENSION ORAL at 06:28

## 2023-12-18 RX ADMIN — HEPARIN SODIUM 5000 UNIT(S): 5000 INJECTION INTRAVENOUS; SUBCUTANEOUS at 12:21

## 2023-12-18 RX ADMIN — ATORVASTATIN CALCIUM 40 MILLIGRAM(S): 80 TABLET, FILM COATED ORAL at 22:04

## 2023-12-18 RX ADMIN — Medication 1 DROP(S): at 17:25

## 2023-12-18 RX ADMIN — LEVETIRACETAM 500 MILLIGRAM(S): 250 TABLET, FILM COATED ORAL at 10:00

## 2023-12-18 RX ADMIN — SODIUM CHLORIDE 125 MILLILITER(S): 9 INJECTION INTRAMUSCULAR; INTRAVENOUS; SUBCUTANEOUS at 19:13

## 2023-12-18 RX ADMIN — Medication 25 MILLIGRAM(S): at 12:21

## 2023-12-18 RX ADMIN — FIDAXOMICIN 200 MILLIGRAM(S): 200 GRANULE, FOR SUSPENSION ORAL at 17:24

## 2023-12-18 RX ADMIN — Medication 1 DROP(S): at 10:00

## 2023-12-18 RX ADMIN — SODIUM CHLORIDE 125 MILLILITER(S): 9 INJECTION INTRAMUSCULAR; INTRAVENOUS; SUBCUTANEOUS at 04:23

## 2023-12-18 RX ADMIN — Medication 650 MILLIGRAM(S): at 17:25

## 2023-12-18 RX ADMIN — PANTOPRAZOLE SODIUM 40 MILLIGRAM(S): 20 TABLET, DELAYED RELEASE ORAL at 06:11

## 2023-12-18 RX ADMIN — Medication 50 MILLIGRAM(S): at 22:04

## 2023-12-18 RX ADMIN — PREGABALIN 500 MICROGRAM(S): 225 CAPSULE ORAL at 10:00

## 2023-12-18 RX ADMIN — PANTOPRAZOLE SODIUM 40 MILLIGRAM(S): 20 TABLET, DELAYED RELEASE ORAL at 17:26

## 2023-12-18 RX ADMIN — HEPARIN SODIUM 5000 UNIT(S): 5000 INJECTION INTRAVENOUS; SUBCUTANEOUS at 22:05

## 2023-12-18 RX ADMIN — MEMANTINE HYDROCHLORIDE 5 MILLIGRAM(S): 10 TABLET ORAL at 10:00

## 2023-12-18 RX ADMIN — LEVETIRACETAM 500 MILLIGRAM(S): 250 TABLET, FILM COATED ORAL at 22:04

## 2023-12-18 RX ADMIN — MEMANTINE HYDROCHLORIDE 5 MILLIGRAM(S): 10 TABLET ORAL at 22:04

## 2023-12-18 RX ADMIN — DONEPEZIL HYDROCHLORIDE 10 MILLIGRAM(S): 10 TABLET, FILM COATED ORAL at 22:04

## 2023-12-18 RX ADMIN — Medication 100 MILLIGRAM(S): at 06:11

## 2023-12-18 RX ADMIN — Medication 81 MILLIGRAM(S): at 06:11

## 2023-12-18 RX ADMIN — Medication 100 GRAM(S): at 04:23

## 2023-12-18 NOTE — H&P ADULT - PROBLEM SELECTOR PLAN 10
F: s/p 1L NS bolus in the ED; maintenance fluids NS at 125cc/hr   E: replete as needed  N: pending bedside dysphagia   DVT ppx: hep subQ   GI ppx: pantoprazole 40mg qd    DISPO: [facility] F: s/p 1L NS bolus in the ED; maintenance fluids NS at 125cc/hr   E: replete as needed  N: pending bedside dysphagia   DVT ppx: hep subQ   GI ppx: pantoprazole 40mg qd    DISPO: [facility] Hgb 7.7 (Hgb 8.5 on 12/15/23 as per Epic records provided by daughter at bedside), MCV 96 likely AoCD iso known CKD. No signs of active bleeding (melena, hematochezia, hemoptysis, hematemesis)    - maintain active T&S  - trend Hgb   - f/u iron panel with AM labs  - transfuse for Hgb < 7

## 2023-12-18 NOTE — PATIENT PROFILE ADULT - FALL HARM RISK - HARM RISK INTERVENTIONS
Assistance with ambulation/Assistance OOB with selected safe patient handling equipment/Communicate Risk of Fall with Harm to all staff/Discuss with provider need for PT consult/Monitor gait and stability/Provide patient with walking aids - walker, cane, crutches/Reinforce activity limits and safety measures with patient and family/Tailored Fall Risk Interventions/Visual Cue: Yellow wristband and red socks/Bed in lowest position, wheels locked, appropriate side rails in place/Call bell, personal items and telephone in reach/Instruct patient to call for assistance before getting out of bed or chair/Non-slip footwear when patient is out of bed/[place] to call system/Physically safe environment - no spills, clutter or unnecessary equipment/Purposeful Proactive Rounding/Room/bathroom lighting operational, light cord in reach Assistance with ambulation/Assistance OOB with selected safe patient handling equipment/Communicate Risk of Fall with Harm to all staff/Discuss with provider need for PT consult/Monitor gait and stability/Provide patient with walking aids - walker, cane, crutches/Reinforce activity limits and safety measures with patient and family/Tailored Fall Risk Interventions/Visual Cue: Yellow wristband and red socks/Bed in lowest position, wheels locked, appropriate side rails in place/Call bell, personal items and telephone in reach/Instruct patient to call for assistance before getting out of bed or chair/Non-slip footwear when patient is out of bed/[place] to call system/Physically safe environment - no spills, clutter or unnecessary equipment/Purposeful Proactive Rounding/Room/bathroom lighting operational, light cord in reach

## 2023-12-18 NOTE — H&P ADULT - PROBLEM SELECTOR PLAN 1
As per daughter at bedside, patient's sx's started 3 weeks ago, she was seen at an OSH initially discharged on vancomycin, with readmission at OSH from 12/10-12/15 during which she was started on fidaxomicin and discharged with instructions to continue taking fidaxomicin 200mg BID for 11 more days (end date 12/26/23). As per daughter, patient was also discharged on keflex 250mg QID for 3 days for a recurrent UTI but states that she stopped giving her mother the Keflex due to worsening diarrhea.    - c/w fidaxomicin 200mg BID   - monitor BM's while on fidaxomicin  - can consider ID consult

## 2023-12-18 NOTE — H&P ADULT - HISTORY OF PRESENT ILLNESS
Patient is a 79 y/o F with pmhx of HTN, CAD, IVC filter placement, dementia, DM, CKD, and recurrent UTI's who presented to the [facility] ED with reports of worsening C. diff refractory to Fidaxomicin. As per daughter at bedside, patient with multiple admissions to [facility] ([facility]) for C. diff initially discharged on vancomycin, with readmission at OSH from 12/10-12/15 during which she was started on fidaxomicin and discharged with instructions to continue taking fidaxomicin 200mg BID for  Patient is a 79 y/o F with pmhx of HTN, CAD, IVC filter placement, dementia, DM, CKD, and recurrent UTI's who presented to the [facility] ED with reports of worsening C. diff refractory to Fidaxomicin. As per daughter at bedside, patient with multiple admissions to [facility] ([facility]) for C. diff initially discharged on vancomycin, with readmission at OSH from 12/10-12/15 during which she was started on fidaxomicin and discharged with instructions to continue taking fidaxomicin 200mg BID for  HPI: Patient is a 77 y/o F with pmhx of HTN, CAD, IVC filter placement, dementia (AAOx1 at baseline), DM, CKD, seizures, and recurrent UTI's who presented to the [facility] ED with reports of worsening C. diff refractory to Fidaxomicin. As per daughter at bedside, patient with multiple admissions to [facility] ([facility]) for C. diff, started 3 weeks ago, initially discharged on vancomycin, with readmission at OSH from 12/10-12/15 during which she was started on fidaxomicin and discharged with instructions to continue taking fidaxomicin 200mg BID for 11 more days (end date 12/26/23). As per daughter, patient was also discharged on keflex 250mg QID for 4 days for a recurrent UTI but states that she stopped giving her mother the Keflex due to worsening diarrhea. Patient was scheduled to establish care with Dr. Hudson for CKD in the upcoming days, which is why daughter decided to bring her to [facility] instead of OSH.   As per daughter at bedside, patient without complaints of CP, SOB, palpitations, n/v, back pain, abdominal pain, or light-headedness.    In the ED,  VS: T 97.3, HR 76, /74, RR 18, SpO2 98% on RA  Labs: WBC 7.59, Hgb 7.7 (Hgb 8.5 on 12/15/23 as per Epic records provided by daughter at bedside), MCV 96, Plt 234, Na 134, K 4.9, bicarb 20, BUN 28, Cr 2.82 (2.10 on 12/15/23), RVP negative, GI PCR negative  EKG: sinus bradycardia   Imaging: None  Orders: 1L NS bolus, maintenance fluids NS at 125cc/hr    HPI: Patient is a 79 y/o F with pmhx of HTN, CAD, IVC filter placement, dementia (AAOx1 at baseline), DM, CKD, seizures, and recurrent UTI's who presented to the [facility] ED with reports of worsening C. diff refractory to Fidaxomicin. As per daughter at bedside, patient with multiple admissions to [facility] ([facility]) for C. diff, started 3 weeks ago, initially discharged on vancomycin, with readmission at OSH from 12/10-12/15 during which she was started on fidaxomicin and discharged with instructions to continue taking fidaxomicin 200mg BID for 11 more days (end date 12/26/23). As per daughter, patient was also discharged on keflex 250mg QID for 4 days for a recurrent UTI but states that she stopped giving her mother the Keflex due to worsening diarrhea. Patient was scheduled to establish care with Dr. Hudson for CKD in the upcoming days, which is why daughter decided to bring her to [facility] instead of OSH.   As per daughter at bedside, patient without complaints of CP, SOB, palpitations, n/v, back pain, abdominal pain, or light-headedness.    In the ED,  VS: T 97.3, HR 76, /74, RR 18, SpO2 98% on RA  Labs: WBC 7.59, Hgb 7.7 (Hgb 8.5 on 12/15/23 as per Epic records provided by daughter at bedside), MCV 96, Plt 234, Na 134, K 4.9, bicarb 20, BUN 28, Cr 2.82 (2.10 on 12/15/23), RVP negative, GI PCR negative  EKG: sinus bradycardia   Imaging: None  Orders: 1L NS bolus, maintenance fluids NS at 125cc/hr    HPI: Patient is a 79 y/o F with pmhx of HTN, CAD, IVC filter placement, dementia (AAOx1 at baseline), DM, CKD, seizures, and recurrent UTI's who presented to the [facility] ED with reports of worsening C. diff refractory to Fidaxomicin. As per daughter at bedside, patient with multiple admissions to [facility] ([facility]) for C. diff, started 3 weeks ago, initially discharged on vancomycin, with readmission at OSH from 12/10-12/15 during which she was started on fidaxomicin and discharged with instructions to continue taking fidaxomicin 200mg BID for 11 more days (end date 12/26/23). As per daughter, patient was also discharged on keflex 250mg QID for 3 days for a recurrent UTI but states that she stopped giving her mother the Keflex due to worsening diarrhea. Patient was scheduled to establish care with Dr. Hudson for CKD in the upcoming days, which is why daughter decided to bring her to [facility] instead of OSH.   As per daughter at bedside, patient without complaints of CP, SOB, palpitations, n/v, back pain, abdominal pain, or light-headedness.    In the ED,  VS: T 97.3, HR 76, /74, RR 18, SpO2 98% on RA  Labs: WBC 7.59, Hgb 7.7 (Hgb 8.5 on 12/15/23 as per Epic records provided by daughter at bedside), MCV 96, Plt 234, Na 134, K 4.9, bicarb 20, BUN 28, Cr 2.82 (2.10 on 12/15/23), RVP negative, GI PCR negative  EKG: sinus bradycardia   Imaging: None  Orders: 1L NS bolus, maintenance fluids NS at 125cc/hr    HPI: Patient is a 77 y/o F with pmhx of HTN, CAD, IVC filter placement, dementia (AAOx1 at baseline), DM, CKD, seizures, and recurrent UTI's who presented to the [facility] ED with reports of worsening C. diff refractory to Fidaxomicin. As per daughter at bedside, patient with multiple admissions to [facility] ([facility]) for C. diff, started 3 weeks ago, initially discharged on vancomycin, with readmission at OSH from 12/10-12/15 during which she was started on fidaxomicin and discharged with instructions to continue taking fidaxomicin 200mg BID for 11 more days (end date 12/26/23). As per daughter, patient was also discharged on keflex 250mg QID for 3 days for a recurrent UTI but states that she stopped giving her mother the Keflex due to worsening diarrhea. Patient was scheduled to establish care with Dr. Hudson for CKD in the upcoming days, which is why daughter decided to bring her to [facility] instead of OSH.   As per daughter at bedside, patient without complaints of CP, SOB, palpitations, n/v, back pain, abdominal pain, or light-headedness.    In the ED,  VS: T 97.3, HR 76, /74, RR 18, SpO2 98% on RA  Labs: WBC 7.59, Hgb 7.7 (Hgb 8.5 on 12/15/23 as per Epic records provided by daughter at bedside), MCV 96, Plt 234, Na 134, K 4.9, bicarb 20, BUN 28, Cr 2.82 (2.10 on 12/15/23), RVP negative, GI PCR negative  EKG: sinus bradycardia   Imaging: None  Orders: 1L NS bolus, maintenance fluids NS at 125cc/hr

## 2023-12-18 NOTE — H&P ADULT - NSHPLABSRESULTS_GEN_ALL_CORE
LABS:                         7.7    7.59  )-----------( 234      ( 17 Dec 2023 22:37 )             25.4     12-17    134<L>  |  104  |  28<H>  ----------------------------<  92  4.9   |  20<L>  |  2.82<H>    Ca    9.5      17 Dec 2023 22:37  Mg     1.6     12-17    Urinalysis Basic - ( 17 Dec 2023 22:37 )    Color: x / Appearance: x / SG: x / pH: x  Gluc: 92 mg/dL / Ketone: x  / Bili: x / Urobili: x   Blood: x / Protein: x / Nitrite: x   Leuk Esterase: x / RBC: x / WBC x   Sq Epi: x / Non Sq Epi: x / Bacteria: x    RADIOLOGY, EKG & ADDITIONAL TESTS: Reviewed.

## 2023-12-18 NOTE — H&P ADULT - PROBLEM SELECTOR PLAN 11
F: s/p 1L NS bolus in the ED; maintenance fluids NS at 125cc/hr   E: replete as needed  N: pending bedside dysphagia   DVT ppx: hep subQ   GI ppx: pantoprazole 40mg qd    DISPO: [facility] F: s/p 1L NS bolus in the ED; maintenance fluids NS at 125cc/hr   E: replete as needed  N: pending bedside dysphagia   DVT ppx: hep subQ   GI ppx: pantoprazole 40mg qd    DISPO: [facility]

## 2023-12-18 NOTE — H&P ADULT - ATTENDING COMMENTS
Patient seen with daughter at the bedside. Daughter reports recurrent episodes of C diff infections, last discharged Friday with Keflex for UTI and Fidaxomicin. Daughter reports noticing continuous diarrhea so brought patient to hospital. No reports of vomiting, no blood in stool, no other symptoms.     General: AOX1, NAD, lying in stretcher, no labored breathing on RA  HEENT: AT/NC, no facial asymmetry   Lungs: poor inspiration, no crackles, no wheezes  Heart: RRR  Abdomen: soft, no tenderness to palpation, no rigidity, hyperactive BS  Extremities:  warm, no edema, no tenderness, no gross focal deficit    Labs reviewed    Diarrhea  Recurrent C diff  Acute kidney injury on CKD  Dementia  HTN/CAD    Patient presenting with persistent diarrhea, no leucocytosis, afebrile, GI PCR negative, follow-up CT abdomen/pelvis. Continue Fidaxomicin  Probably some pre-renal component, gentle IVF, hold nephrotoxics.

## 2023-12-18 NOTE — ED PROVIDER NOTE - PHYSICAL EXAMINATION
Constitutional : Well appearing, non-toxic, no acute distress. awake, alert, oriented to person, place, time/situation.  Head : head normocephalic, atraumatic  EENMT : eyes clear bilaterally, PERRL, EOMI. airway patent. dry mucous membranes. neck supple.  Cardiac : Normal rate, regular rhythm. No murmur appreciated, no LE edema.  Resp : Breath sounds clear and equal bilaterally. Respirations even and unlabored.   Gastro : abdomen soft, nontender, nondistended. no rebound or guarding. no CVAT.  MSK :  range of motion is not limited, no muscle or joint tenderness  Back : No evidence of trauma. No spinal or CVA tenderness.  Vasc : Extremities warm and well perfused. 2+ radial and DP pulses. cap refill <2 seconds  Neuro : Alert and oriented, CNII-XII grossly intact, no focal deficits, no motor or sensory deficits.  Skin : Skin normal color for race, warm, dry and intact. No evidence of rash.  Psych : Alert and oriented to person, place, time/situation. normal mood and affect. no apparent risk to self or others.

## 2023-12-18 NOTE — H&P ADULT - ASSESSMENT
Patient is a 79 y/o F with pmhx of HTN, CAD, IVC filter placement, dementia (AAOx1 at baseline), DM, CKD, seizures, and recurrent UTI's who presented to the [facility] ED with reports of worsening C. diff refractory to Fidaxomicin, admitted for management.  Patient is a 79 y/o F with pmhx of HTN, CAD, IVC filter placement, dementia (AAOx1 at baseline), DM, CKD, seizures, and recurrent UTI's who presented to the [facility] ED with reports of worsening C. diff refractory to Fidaxomicin, admitted for management.

## 2023-12-18 NOTE — H&P ADULT - NSHPPHYSICALEXAM_GEN_ALL_CORE
PHYSICAL EXAM:    Vital Signs Last 24 Hrs  T(C): 36.4 (17 Dec 2023 23:47), Max: 36.4 (17 Dec 2023 23:47)  T(F): 97.5 (17 Dec 2023 23:47), Max: 97.5 (17 Dec 2023 23:47)  HR: 55 (17 Dec 2023 23:47) (55 - 76)  BP: 164/71 (17 Dec 2023 23:47) (132/74 - 164/71)  BP(mean): --  RR: 17 (17 Dec 2023 23:47) (17 - 18)  SpO2: 98% (17 Dec 2023 23:47) (98% - 98%)    Parameters below as of 17 Dec 2023 23:47  Patient On (Oxygen Delivery Method): room air      I&O's Summary    General: NAD, appears stated age  HEENT: NC/AT; anicteric sclera  Cardiovascular: bradycardic   Respiratory: CTAB   Gastrointestinal: soft, NTND abdomen   Genitourinary: no CVA tenderness b/l   Extremities: cool extremities   Vascular: 2+ radial pulses b/l  Neurological: AAOx1 to person only   Dermatological: no rashes, skin intact PHYSICAL EXAM:    Vital Signs Last 24 Hrs  T(C): 36.4 (17 Dec 2023 23:47), Max: 36.4 (17 Dec 2023 23:47)  T(F): 97.5 (17 Dec 2023 23:47), Max: 97.5 (17 Dec 2023 23:47)  HR: 55 (17 Dec 2023 23:47) (55 - 76)  BP: 164/71 (17 Dec 2023 23:47) (132/74 - 164/71)  BP(mean): --  RR: 17 (17 Dec 2023 23:47) (17 - 18)  SpO2: 98% (17 Dec 2023 23:47) (98% - 98%)    Parameters below as of 17 Dec 2023 23:47  Patient On (Oxygen Delivery Method): room air      I&O's Summary    General: NAD, appears stated age  HEENT: NC/AT; anicteric sclera  Cardiovascular: bradycardic   Respiratory: CTAB   Gastrointestinal: soft, NTND abdomen   Genitourinary: no suprapubic tenderness, no CVA tenderness b/l   Extremities: cool extremities   Vascular: 2+ radial pulses b/l  Neurological: AAOx1 to person only   Dermatological: no rashes, skin intact

## 2023-12-18 NOTE — ED PROVIDER NOTE - OBJECTIVE STATEMENT
78 yr old female, history of     diarrhea x 2 weeks. was seen at outside hospital, dx w kimberlyff, started on vanco. symptoms improved and then returned. went back to hospital, admitted from 12/10-12/15 and switched to dificid. discharged 12/15, diarrhea had improved at that time but daughter did not feel pt was improving. now w increased diarrhea, dec po intake, 78 yr old female, history of HTN, CAD, IVC filter placement, dementia (AAOx1 at baseline), DM, CKD, seizures, and recurrent UTI's presents to the Emergency Department w diarrhea. weakness. initially diarrhea 3 weeks ago seen at OSH diagnosed w CDiff and initially discharged on vancomycin, with readmission at OSH from 12/10-12/15 switched to dificid, discharged w 11 day course dificid on 12/15, diarrhea had improved at that time but daughter did not feel pt was improving. now w increased diarrhea, dec po intake, generalized weakness.   no fever, abd pain, vomiting.

## 2023-12-18 NOTE — CHART NOTE - NSCHARTNOTEFT_GEN_A_CORE
Infectious Diseases Anti-infective Approval Note    Medication: Fidaxomicin  Dose*: 200mg  Route: PO  Frequency: q12h  Duration**: 7 days    *Dose may be adjusted as needed for alterations in renal function.    **Reflects duration of approval and not necessarily duration of therapy     THIS IS NOT AN INFECTIOUS DISEASES CONSULTATION

## 2023-12-18 NOTE — PATIENT PROFILE ADULT - NSPROGENPREVTRANSF_GEN_A_NUR
-- DO NOT REPLY / DO NOT REPLY ALL --  -- Message is from the [facility]--    COVID-19 Universal Screening: N/A - Not about scheduling    General Patient Message      Reason for Call: Patient needs medication; zpak to be called into [facility]. Please call patient to confirm so she can pick it up.    Caller Information       Type Contact Phone    11/25/2020 02:32 PM CST Phone (Incoming) Radha Barker (Self) [phone] (H)          Alternative phone number: none    Turnaround time given to caller:   \"This message will be sent to [state Provider's name]. The clinical team will fulfill your request as soon as they review your message.\"     no

## 2023-12-18 NOTE — H&P ADULT - NSICDXPASTMEDICALHX_GEN_ALL_CORE_FT
PAST MEDICAL HISTORY:  Anemia     Atrial fibrillation     CAD (coronary artery disease)     Chronic GERD     Dementia     Depression     Diabetes     Gout     History of hypotension     HLD (hyperlipidemia)     HTN (hypertension)     Seizure

## 2023-12-18 NOTE — H&P ADULT - PROBLEM SELECTOR PLAN 2
Patient with Cr 2.82 on admission was 2.10 on 12/15/23 at OSH (per Epic records obtained via daughter). Patient with known CKD, has an upcoming appointment with Dr. Hudson to establish care for CKD. Etiologies of ALE likely pre-renal iso diarrhea for 3 weeks 2/2 C. diff. s/p 1L NS bolus    - c/w maintenance fluids NS at 125cc/hr   - trend Cr  - avoid nephrotoxic drugs   - can notify Dr. Hudson that patient was admitted

## 2023-12-18 NOTE — ED PROVIDER NOTE - NS ED ATTENDING STATEMENT MOD
This was a shared visit with the YUDELKA. I reviewed and verified the documentation and independently performed the documented:

## 2023-12-18 NOTE — H&P ADULT - PROBLEM SELECTOR PLAN 3
Patient with hx of recurrent UTI's, was recently discharged from OSH on Keflex 250mg QID for 3 days, however daughter states she stopped giving patient Keflex iso worsening C. diff diarrhea. Patient denies any urinary sx's but patient also demented. As per daughter, patient's HHA's constantly changer her adult diapers due to frequent urination.     - f/u UA with reflex Cx

## 2023-12-18 NOTE — H&P ADULT - PROBLEM SELECTOR PLAN 5
Patient on metoprolol succinate ER 25mg qd, BP on admission  /74--> 164/71    - c/w metoprolol succinate ER 25mg qd with BP and HR parameters given bradycardia

## 2023-12-18 NOTE — H&P ADULT - PROBLEM SELECTOR PLAN 4
Patient with known dementia, baseline AAOx1 to person as per daughter, has 24 hr HHA. Patient on donepezil 10mg qhs, memantine 5mg BID, and trazodone 50mg qhs     - c/w donepezil 10mg qhs, memantine 5mg BID, and trazodone 50mg qhs

## 2023-12-18 NOTE — ED PROVIDER NOTE - CLINICAL SUMMARY MEDICAL DECISION MAKING FREE TEXT BOX
history of HTN, CAD, IVC filter placement, dementia (AAOx1 at baseline), DM, CKD, seizures, and recurrent UTI's. 3 weeks diarrhea,. dx w CDiff and on vanco then switched to dificid after no improvement. most recently discharged 12/15 on dificid. now w increased diarrhea, dec po intake, generalized weakness. no fever, abd pain, vomiting.   pt nontoxic appearing, stable vitals, exam reassuring - no abd tenderness  suspect diarrhea from known cdiff infection.   r/o anemia, electrolyte derangement, dehydration.  has not complained of abd pain and benign abd. do not feel need for abd imaging  plan - labs, urine  iv hydration  anticipate admission for FTT

## 2023-12-18 NOTE — H&P ADULT - PROBLEM SELECTOR PLAN 7
Not on any meds    - FGS q6hrs   - f/u A1c with AM labs   - caution with insulin given CKD and old age

## 2023-12-18 NOTE — ED PROVIDER NOTE - PROGRESS NOTE DETAILS
hgb 7.7 / hct 25.4 - similar in past but slightly lower than last labs.   creatinine 2.8 - has hx of ckd but last labs 2.2.   give 1L bolus and started on maintenance fluids.   admitted to medicine for cdiff, ftt, dehydration

## 2023-12-19 ENCOUNTER — TRANSCRIPTION ENCOUNTER (OUTPATIENT)
Age: 78
End: 2023-12-19

## 2023-12-19 VITALS
HEART RATE: 61 BPM | OXYGEN SATURATION: 97 % | TEMPERATURE: 99 F | DIASTOLIC BLOOD PRESSURE: 79 MMHG | SYSTOLIC BLOOD PRESSURE: 138 MMHG | RESPIRATION RATE: 17 BRPM

## 2023-12-19 LAB
ALBUMIN SERPL ELPH-MCNC: 3 G/DL — LOW (ref 3.3–5)
ALBUMIN SERPL ELPH-MCNC: 3 G/DL — LOW (ref 3.3–5)
ALP SERPL-CCNC: 114 U/L — SIGNIFICANT CHANGE UP (ref 40–120)
ALP SERPL-CCNC: 114 U/L — SIGNIFICANT CHANGE UP (ref 40–120)
ALT FLD-CCNC: 7 U/L — LOW (ref 10–45)
ALT FLD-CCNC: 7 U/L — LOW (ref 10–45)
ANION GAP SERPL CALC-SCNC: 10 MMOL/L — SIGNIFICANT CHANGE UP (ref 5–17)
ANION GAP SERPL CALC-SCNC: 10 MMOL/L — SIGNIFICANT CHANGE UP (ref 5–17)
AST SERPL-CCNC: 11 U/L — SIGNIFICANT CHANGE UP (ref 10–40)
AST SERPL-CCNC: 11 U/L — SIGNIFICANT CHANGE UP (ref 10–40)
BASOPHILS # BLD AUTO: 0.02 K/UL — SIGNIFICANT CHANGE UP (ref 0–0.2)
BASOPHILS # BLD AUTO: 0.02 K/UL — SIGNIFICANT CHANGE UP (ref 0–0.2)
BASOPHILS NFR BLD AUTO: 0.4 % — SIGNIFICANT CHANGE UP (ref 0–2)
BASOPHILS NFR BLD AUTO: 0.4 % — SIGNIFICANT CHANGE UP (ref 0–2)
BILIRUB SERPL-MCNC: 0.2 MG/DL — SIGNIFICANT CHANGE UP (ref 0.2–1.2)
BILIRUB SERPL-MCNC: 0.2 MG/DL — SIGNIFICANT CHANGE UP (ref 0.2–1.2)
BLD GP AB SCN SERPL QL: NEGATIVE — SIGNIFICANT CHANGE UP
BLD GP AB SCN SERPL QL: NEGATIVE — SIGNIFICANT CHANGE UP
BUN SERPL-MCNC: 23 MG/DL — SIGNIFICANT CHANGE UP (ref 7–23)
BUN SERPL-MCNC: 23 MG/DL — SIGNIFICANT CHANGE UP (ref 7–23)
CALCIUM SERPL-MCNC: 8.6 MG/DL — SIGNIFICANT CHANGE UP (ref 8.4–10.5)
CALCIUM SERPL-MCNC: 8.6 MG/DL — SIGNIFICANT CHANGE UP (ref 8.4–10.5)
CHLORIDE SERPL-SCNC: 115 MMOL/L — HIGH (ref 96–108)
CHLORIDE SERPL-SCNC: 115 MMOL/L — HIGH (ref 96–108)
CO2 SERPL-SCNC: 18 MMOL/L — LOW (ref 22–31)
CO2 SERPL-SCNC: 18 MMOL/L — LOW (ref 22–31)
CREAT SERPL-MCNC: 2.38 MG/DL — HIGH (ref 0.5–1.3)
CREAT SERPL-MCNC: 2.38 MG/DL — HIGH (ref 0.5–1.3)
EGFR: 20 ML/MIN/1.73M2 — LOW
EGFR: 20 ML/MIN/1.73M2 — LOW
EOSINOPHIL # BLD AUTO: 0.04 K/UL — SIGNIFICANT CHANGE UP (ref 0–0.5)
EOSINOPHIL # BLD AUTO: 0.04 K/UL — SIGNIFICANT CHANGE UP (ref 0–0.5)
EOSINOPHIL NFR BLD AUTO: 0.8 % — SIGNIFICANT CHANGE UP (ref 0–6)
EOSINOPHIL NFR BLD AUTO: 0.8 % — SIGNIFICANT CHANGE UP (ref 0–6)
GLUCOSE BLDC GLUCOMTR-MCNC: 101 MG/DL — HIGH (ref 70–99)
GLUCOSE BLDC GLUCOMTR-MCNC: 108 MG/DL — HIGH (ref 70–99)
GLUCOSE BLDC GLUCOMTR-MCNC: 108 MG/DL — HIGH (ref 70–99)
GLUCOSE BLDC GLUCOMTR-MCNC: 113 MG/DL — HIGH (ref 70–99)
GLUCOSE BLDC GLUCOMTR-MCNC: 113 MG/DL — HIGH (ref 70–99)
GLUCOSE SERPL-MCNC: 93 MG/DL — SIGNIFICANT CHANGE UP (ref 70–99)
GLUCOSE SERPL-MCNC: 93 MG/DL — SIGNIFICANT CHANGE UP (ref 70–99)
HCT VFR BLD CALC: 26.3 % — LOW (ref 34.5–45)
HCT VFR BLD CALC: 26.3 % — LOW (ref 34.5–45)
HGB BLD-MCNC: 7.5 G/DL — LOW (ref 11.5–15.5)
HGB BLD-MCNC: 7.5 G/DL — LOW (ref 11.5–15.5)
IMM GRANULOCYTES NFR BLD AUTO: 0.6 % — SIGNIFICANT CHANGE UP (ref 0–0.9)
IMM GRANULOCYTES NFR BLD AUTO: 0.6 % — SIGNIFICANT CHANGE UP (ref 0–0.9)
LYMPHOCYTES # BLD AUTO: 0.88 K/UL — LOW (ref 1–3.3)
LYMPHOCYTES # BLD AUTO: 0.88 K/UL — LOW (ref 1–3.3)
LYMPHOCYTES # BLD AUTO: 17.3 % — SIGNIFICANT CHANGE UP (ref 13–44)
LYMPHOCYTES # BLD AUTO: 17.3 % — SIGNIFICANT CHANGE UP (ref 13–44)
MAGNESIUM SERPL-MCNC: 1.6 MG/DL — SIGNIFICANT CHANGE UP (ref 1.6–2.6)
MAGNESIUM SERPL-MCNC: 1.6 MG/DL — SIGNIFICANT CHANGE UP (ref 1.6–2.6)
MCHC RBC-ENTMCNC: 28.5 GM/DL — LOW (ref 32–36)
MCHC RBC-ENTMCNC: 28.5 GM/DL — LOW (ref 32–36)
MCHC RBC-ENTMCNC: 29.4 PG — SIGNIFICANT CHANGE UP (ref 27–34)
MCHC RBC-ENTMCNC: 29.4 PG — SIGNIFICANT CHANGE UP (ref 27–34)
MCV RBC AUTO: 103.1 FL — HIGH (ref 80–100)
MCV RBC AUTO: 103.1 FL — HIGH (ref 80–100)
MONOCYTES # BLD AUTO: 0.32 K/UL — SIGNIFICANT CHANGE UP (ref 0–0.9)
MONOCYTES # BLD AUTO: 0.32 K/UL — SIGNIFICANT CHANGE UP (ref 0–0.9)
MONOCYTES NFR BLD AUTO: 6.3 % — SIGNIFICANT CHANGE UP (ref 2–14)
MONOCYTES NFR BLD AUTO: 6.3 % — SIGNIFICANT CHANGE UP (ref 2–14)
NEUTROPHILS # BLD AUTO: 3.8 K/UL — SIGNIFICANT CHANGE UP (ref 1.8–7.4)
NEUTROPHILS # BLD AUTO: 3.8 K/UL — SIGNIFICANT CHANGE UP (ref 1.8–7.4)
NEUTROPHILS NFR BLD AUTO: 74.6 % — SIGNIFICANT CHANGE UP (ref 43–77)
NEUTROPHILS NFR BLD AUTO: 74.6 % — SIGNIFICANT CHANGE UP (ref 43–77)
NRBC # BLD: 0 /100 WBCS — SIGNIFICANT CHANGE UP (ref 0–0)
NRBC # BLD: 0 /100 WBCS — SIGNIFICANT CHANGE UP (ref 0–0)
PHOSPHATE SERPL-MCNC: 3.3 MG/DL — SIGNIFICANT CHANGE UP (ref 2.5–4.5)
PHOSPHATE SERPL-MCNC: 3.3 MG/DL — SIGNIFICANT CHANGE UP (ref 2.5–4.5)
PLATELET # BLD AUTO: 196 K/UL — SIGNIFICANT CHANGE UP (ref 150–400)
PLATELET # BLD AUTO: 196 K/UL — SIGNIFICANT CHANGE UP (ref 150–400)
POTASSIUM SERPL-MCNC: 4.9 MMOL/L — SIGNIFICANT CHANGE UP (ref 3.5–5.3)
POTASSIUM SERPL-MCNC: 4.9 MMOL/L — SIGNIFICANT CHANGE UP (ref 3.5–5.3)
POTASSIUM SERPL-SCNC: 4.9 MMOL/L — SIGNIFICANT CHANGE UP (ref 3.5–5.3)
POTASSIUM SERPL-SCNC: 4.9 MMOL/L — SIGNIFICANT CHANGE UP (ref 3.5–5.3)
PROT SERPL-MCNC: 5.6 G/DL — LOW (ref 6–8.3)
PROT SERPL-MCNC: 5.6 G/DL — LOW (ref 6–8.3)
RBC # BLD: 2.55 M/UL — LOW (ref 3.8–5.2)
RBC # BLD: 2.55 M/UL — LOW (ref 3.8–5.2)
RBC # FLD: 15.9 % — HIGH (ref 10.3–14.5)
RBC # FLD: 15.9 % — HIGH (ref 10.3–14.5)
RH IG SCN BLD-IMP: POSITIVE — SIGNIFICANT CHANGE UP
RH IG SCN BLD-IMP: POSITIVE — SIGNIFICANT CHANGE UP
SODIUM SERPL-SCNC: 143 MMOL/L — SIGNIFICANT CHANGE UP (ref 135–145)
SODIUM SERPL-SCNC: 143 MMOL/L — SIGNIFICANT CHANGE UP (ref 135–145)
WBC # BLD: 5.09 K/UL — SIGNIFICANT CHANGE UP (ref 3.8–10.5)
WBC # BLD: 5.09 K/UL — SIGNIFICANT CHANGE UP (ref 3.8–10.5)
WBC # FLD AUTO: 5.09 K/UL — SIGNIFICANT CHANGE UP (ref 3.8–10.5)
WBC # FLD AUTO: 5.09 K/UL — SIGNIFICANT CHANGE UP (ref 3.8–10.5)

## 2023-12-19 PROCEDURE — 83036 HEMOGLOBIN GLYCOSYLATED A1C: CPT

## 2023-12-19 PROCEDURE — 82728 ASSAY OF FERRITIN: CPT

## 2023-12-19 PROCEDURE — 87324 CLOSTRIDIUM AG IA: CPT

## 2023-12-19 PROCEDURE — 36415 COLL VENOUS BLD VENIPUNCTURE: CPT

## 2023-12-19 PROCEDURE — 87449 NOS EACH ORGANISM AG IA: CPT

## 2023-12-19 PROCEDURE — 80048 BASIC METABOLIC PNL TOTAL CA: CPT

## 2023-12-19 PROCEDURE — 74018 RADEX ABDOMEN 1 VIEW: CPT | Mod: 26

## 2023-12-19 PROCEDURE — 83550 IRON BINDING TEST: CPT

## 2023-12-19 PROCEDURE — 84100 ASSAY OF PHOSPHORUS: CPT

## 2023-12-19 PROCEDURE — 87493 C DIFF AMPLIFIED PROBE: CPT

## 2023-12-19 PROCEDURE — 86901 BLOOD TYPING SEROLOGIC RH(D): CPT

## 2023-12-19 PROCEDURE — 99285 EMERGENCY DEPT VISIT HI MDM: CPT

## 2023-12-19 PROCEDURE — 84466 ASSAY OF TRANSFERRIN: CPT

## 2023-12-19 PROCEDURE — 85610 PROTHROMBIN TIME: CPT

## 2023-12-19 PROCEDURE — 87637 SARSCOV2&INF A&B&RSV AMP PRB: CPT

## 2023-12-19 PROCEDURE — 83540 ASSAY OF IRON: CPT

## 2023-12-19 PROCEDURE — 85025 COMPLETE CBC W/AUTO DIFF WBC: CPT

## 2023-12-19 PROCEDURE — 80053 COMPREHEN METABOLIC PANEL: CPT

## 2023-12-19 PROCEDURE — 86850 RBC ANTIBODY SCREEN: CPT

## 2023-12-19 PROCEDURE — 83735 ASSAY OF MAGNESIUM: CPT

## 2023-12-19 PROCEDURE — 86900 BLOOD TYPING SEROLOGIC ABO: CPT

## 2023-12-19 PROCEDURE — 87507 IADNA-DNA/RNA PROBE TQ 12-25: CPT

## 2023-12-19 PROCEDURE — 93005 ELECTROCARDIOGRAM TRACING: CPT

## 2023-12-19 PROCEDURE — 74018 RADEX ABDOMEN 1 VIEW: CPT

## 2023-12-19 PROCEDURE — 85730 THROMBOPLASTIN TIME PARTIAL: CPT

## 2023-12-19 PROCEDURE — 87040 BLOOD CULTURE FOR BACTERIA: CPT

## 2023-12-19 PROCEDURE — 80061 LIPID PANEL: CPT

## 2023-12-19 PROCEDURE — 82962 GLUCOSE BLOOD TEST: CPT

## 2023-12-19 PROCEDURE — 99233 SBSQ HOSP IP/OBS HIGH 50: CPT | Mod: GC

## 2023-12-19 RX ORDER — SODIUM BICARBONATE 1 MEQ/ML
1 SYRINGE (ML) INTRAVENOUS
Qty: 40 | Refills: 0
Start: 2023-12-19 | End: 2024-01-07

## 2023-12-19 RX ORDER — MAGNESIUM SULFATE 500 MG/ML
2 VIAL (ML) INJECTION ONCE
Refills: 0 | Status: COMPLETED | OUTPATIENT
Start: 2023-12-19 | End: 2023-12-19

## 2023-12-19 RX ORDER — FIDAXOMICIN 200 MG/5ML
1 GRANULE, FOR SUSPENSION ORAL
Qty: 15 | Refills: 0
Start: 2023-12-19

## 2023-12-19 RX ADMIN — SODIUM CHLORIDE 125 MILLILITER(S): 9 INJECTION INTRAMUSCULAR; INTRAVENOUS; SUBCUTANEOUS at 12:16

## 2023-12-19 RX ADMIN — PREGABALIN 500 MICROGRAM(S): 225 CAPSULE ORAL at 09:23

## 2023-12-19 RX ADMIN — PANTOPRAZOLE SODIUM 40 MILLIGRAM(S): 20 TABLET, DELAYED RELEASE ORAL at 17:34

## 2023-12-19 RX ADMIN — Medication 25 MILLIGRAM(S): at 12:14

## 2023-12-19 RX ADMIN — FIDAXOMICIN 200 MILLIGRAM(S): 200 GRANULE, FOR SUSPENSION ORAL at 17:33

## 2023-12-19 RX ADMIN — FIDAXOMICIN 200 MILLIGRAM(S): 200 GRANULE, FOR SUSPENSION ORAL at 06:22

## 2023-12-19 RX ADMIN — HEPARIN SODIUM 5000 UNIT(S): 5000 INJECTION INTRAVENOUS; SUBCUTANEOUS at 06:22

## 2023-12-19 RX ADMIN — Medication 650 MILLIGRAM(S): at 06:22

## 2023-12-19 RX ADMIN — Medication 1 DROP(S): at 09:24

## 2023-12-19 RX ADMIN — Medication 1 DROP(S): at 17:34

## 2023-12-19 RX ADMIN — PANTOPRAZOLE SODIUM 40 MILLIGRAM(S): 20 TABLET, DELAYED RELEASE ORAL at 06:22

## 2023-12-19 RX ADMIN — Medication 81 MILLIGRAM(S): at 06:22

## 2023-12-19 RX ADMIN — LEVETIRACETAM 500 MILLIGRAM(S): 250 TABLET, FILM COATED ORAL at 09:23

## 2023-12-19 RX ADMIN — Medication 25 GRAM(S): at 12:16

## 2023-12-19 RX ADMIN — Medication 650 MILLIGRAM(S): at 17:33

## 2023-12-19 RX ADMIN — Medication 100 MILLIGRAM(S): at 06:22

## 2023-12-19 RX ADMIN — MEMANTINE HYDROCHLORIDE 5 MILLIGRAM(S): 10 TABLET ORAL at 09:23

## 2023-12-19 RX ADMIN — HEPARIN SODIUM 5000 UNIT(S): 5000 INJECTION INTRAVENOUS; SUBCUTANEOUS at 12:14

## 2023-12-19 NOTE — CONSULT NOTE ADULT - ASSESSMENT
77 yo F w/ PMH of CKD (b/l possibly around 2.1 per prior labs), HTN, CAD, DM2, IVC filter, dementia, recurrent UTIs with history of nephrolithiasis who presents with worsening diarrhea in the setting of refractory c diff. Here c diff PCR negative. Creatinine initially 2.9, improved to 2.4 with fluids. Daughter reports patient voiding spontaneously. Planned to establish care with Dr. Hudson for CKD in January. Daughter states patient has longstanding history of renal impairment but is unsure of baseline values or etiology. Has also had recurrent UTIs recently requiring antibiotics. Nephrology consulted for additional evaluation and management of ALE on CKD.    ALE on CKD - suspect pre-renal etiology in setting of diarrhea and volume depletion, improving with fluids. Baseline reportedly around 2.1 per daughter although she is unsure. Patient reportedly voiding well spontaneously. Daughter is unsure of etiology of CKD but states she has longstanding renal issues and history of nephrolithiasis.  - Creatinine improving with fluids, can continue hydration, encourage PO fluid intake  - Please obtain renal/bladder US  - Check urine studies - UA, urine protein/creatinine, urine lytes  - Check CK, uric acid  - Monitor UOP, ensure patient is not retaining urine  - Can continue bicarb supplementation  - Allopurinol dose higher than recommended initial dose given degree of renal impairment. Unclear if dose was titrated up for clinical efficacy or was started when patient had improved renal function. Can consider reducing dose to 50mg every other day or follow up with PCP  - If planned for discharge, would have repeat labs done within 3-5 days from discharge to monitor creatinine with close PCP follow up as well as with Dr. Hudson to establish care.

## 2023-12-19 NOTE — CONSULT NOTE ADULT - SUBJECTIVE AND OBJECTIVE BOX
NEPHROLOGY SERVICE INITIAL CONSULT NOTE    Nesha Hillman is a 79 yo F w/ PMH of CKD (b/l possibly around 2.1 per prior labs), HTN, CAD, DM2, IVC filter, dementia, recurrent UTIs with history of nephrolithiasis who presents with worsening diarrhea in the setting of refractory c diff. Here c diff PCR negative. Creatinine initially 2.9, improved to 2.4 with fluids. Daughter reports patient voiding spontaneously. Planned to establish care with Dr. Hudson for CKD in January. Daughter states patient has longstanding history of renal impairment but is unsure of baseline values or etiology. Has also had recurrent UTIs recently requiring antibiotics. Nephrology consulted for additional evaluation and management of ALE on CKD.    REVIEW OF SYSTEMS:   Otherwise negative except as specified in HPI    PAST MEDICAL AND SURGICAL HISTORY:   PAST MEDICAL & SURGICAL HISTORY:  CAD (coronary artery disease)      HTN (hypertension)      HLD (hyperlipidemia)      Anemia      Seizure      Dementia      Depression      Diabetes      Atrial fibrillation      Gout      History of hypotension      Chronic GERD      H/O thyroidectomy      S/P partial gastrectomy          FAMILY HISTORY:  FAMILY HISTORY:  Family history of diabetes mellitus (Mother)    Family history of hypertension (Mother)        Otherwise Noncontributory to current admission    SOCIAL HISTORY:  Tobacco use: Denies  EtOH use: Denies  Illicit drug use: Denies    HOME MEDICATIONS:      ACTIVE MEDICATIONS:  MEDICATIONS  (STANDING):  allopurinol 100 milliGRAM(s) Oral every 24 hours  aspirin  chewable 81 milliGRAM(s) Oral every 24 hours  atorvastatin 40 milliGRAM(s) Oral at bedtime  carboxymethylcellulose 0.5% (Preservative-Free) Ophthalmic Solution 1 Drop(s) Right EYE every 8 hours  cyanocobalamin 500 MICROGram(s) Oral every 24 hours  donepezil 10 milliGRAM(s) Oral at bedtime  fidaxomicin 200 milliGRAM(s) Oral two times a day  heparin   Injectable 5000 Unit(s) SubCutaneous every 8 hours  levETIRAcetam 500 milliGRAM(s) Oral every 12 hours  memantine 5 milliGRAM(s) Oral every 12 hours  metoprolol succinate ER 25 milliGRAM(s) Oral every 24 hours  pantoprazole    Tablet 40 milliGRAM(s) Oral every 12 hours  sodium bicarbonate 650 milliGRAM(s) Oral every 12 hours  sodium chloride 0.9%. 1000 milliLiter(s) (125 mL/Hr) IV Continuous <Continuous>  traZODone 50 milliGRAM(s) Oral at bedtime    MEDICATIONS  (PRN):      ALLERGIES:  Allergies    No Known Allergies    Intolerances        VITAL SIGNS:  Vital Signs Last 24 Hrs  T(C): 36.8 (19 Dec 2023 11:38), Max: 37.8 (18 Dec 2023 15:43)  T(F): 98.3 (19 Dec 2023 11:38), Max: 100.1 (18 Dec 2023 15:43)  HR: 61 (19 Dec 2023 11:38) (61 - 72)  BP: 122/75 (19 Dec 2023 11:38) (122/75 - 165/79)  BP(mean): 102 (18 Dec 2023 18:55) (102 - 102)  RR: 17 (19 Dec 2023 11:38) (16 - 18)  SpO2: 96% (19 Dec 2023 11:38) (94% - 99%)    Parameters below as of 19 Dec 2023 11:38  Patient On (Oxygen Delivery Method): room air          PHYSICAL EXAM:  Constitutional: NAD, lying in bed  Head: NCAT, EOMI  Respiratory: CTAB, no crackles  Cardiac: Regular rate  Gastrointestinal: non-tender, mildly distended  Extremities: no peripheral edema  Neurologic: Awake, confused but interactive, answers questions appropriately    LABS:                        7.5    5.09  )-----------( 196      ( 19 Dec 2023 05:30 )             26.3     12-19    143  |  115<H>  |  23  ----------------------------<  93  4.9   |  18<L>  |  2.38<H>    Ca    8.6      19 Dec 2023 05:30  Phos  3.3     12-19  Mg     1.6     12-19    TPro  5.6<L>  /  Alb  3.0<L>  /  TBili  0.2  /  DBili  x   /  AST  11  /  ALT  7<L>  /  AlkPhos  114  12-19    PT/INR - ( 18 Dec 2023 10:00 )   PT: 12.6 sec;   INR: 1.11          PTT - ( 18 Dec 2023 10:00 )  PTT:28.2 sec  Urinalysis Basic - ( 19 Dec 2023 05:30 )    Color: x / Appearance: x / SG: x / pH: x  Gluc: 93 mg/dL / Ketone: x  / Bili: x / Urobili: x   Blood: x / Protein: x / Nitrite: x   Leuk Esterase: x / RBC: x / WBC x   Sq Epi: x / Non Sq Epi: x / Bacteria: x          CAPILLARY BLOOD GLUCOSE      POCT Blood Glucose.: 113 mg/dL (19 Dec 2023 12:09)  POCT Blood Glucose.: 101 mg/dL (19 Dec 2023 05:56)  POCT Blood Glucose.: 108 mg/dL (19 Dec 2023 00:08)          RADIOLOGY & ADDITIONAL TESTS: Reviewed.

## 2023-12-19 NOTE — DISCHARGE NOTE NURSING/CASE MANAGEMENT/SOCIAL WORK - NSDCPEFALRISK_GEN_ALL_CORE
For information on Fall & Injury Prevention, visit: [email or web address] OR  [email or web address] OR  https://www.cdc.gov/steadi/patient.html For information on Fall & Injury Prevention, visit: [email or web address] OR  [email or web address] OR  https://www.cdc.gov/steadi/patient.html

## 2023-12-19 NOTE — DISCHARGE NOTE PROVIDER - CARE PROVIDER_API CALL
Jose Reina  Internal Medicine  [address] [address]  Phone: [phone]  Fax: [phone]  Established Patient  Follow Up Time: 2 weeks   Jose Reina  Internal Medicine  [address] [address]  Phone: [phone]  Fax: [phone]  Established Patient  Follow Up Time: 2 weeks

## 2023-12-19 NOTE — DISCHARGE NOTE PROVIDER - NSDCMRMEDTOKEN_GEN_ALL_CORE_FT
allopurinol 100 mg oral tablet: 1 tab(s) orally once a day  cyanocobalamin 1000 mcg oral tablet: 0.5 tab(s) orally once a day  Dificid 200 mg oral tablet: 1 tab(s) orally 2 times a day  donepezil 5 mg oral tablet: 2 tab(s) orally once a day (at bedtime)  Keppra 500 mg oral tablet: 1 tab(s) orally 2 times a day  Lipitor 10 mg oral tablet: 1 tab(s) orally once a day (at bedtime)  memantine 10 mg oral tablet: 1 tab(s) orally once a day  Metoprolol Succinate ER 25 mg oral tablet, extended release: 1 tab(s) orally once a day  pantoprazole 40 mg oral delayed release tablet: 1 tab(s) orally once a day (before a meal)  traZODone 50 mg oral tablet: 1 tab(s) orally once a day (at bedtime)   allopurinol 100 mg oral tablet: 1 tab(s) orally once a day  cyanocobalamin 1000 mcg oral tablet: 0.5 tab(s) orally once a day  Dificid 200 mg oral tablet: 1 tab(s) orally 2 times a day  donepezil 5 mg oral tablet: 2 tab(s) orally once a day (at bedtime)  Keppra 500 mg oral tablet: 1 tab(s) orally 2 times a day  Lipitor 10 mg oral tablet: 1 tab(s) orally once a day (at bedtime)  memantine 10 mg oral tablet: 1 tab(s) orally once a day  Metoprolol Succinate ER 25 mg oral tablet, extended release: 1 tab(s) orally once a day  pantoprazole 40 mg oral delayed release tablet: 1 tab(s) orally once a day (before a meal)  sodium bicarbonate 650 mg oral tablet: 1 tab(s) orally every 12 hours  traZODone 50 mg oral tablet: 1 tab(s) orally once a day (at bedtime)

## 2023-12-19 NOTE — DISCHARGE NOTE PROVIDER - PROVIDER TOKENS
PROVIDER:[TOKEN:[[id number]],FOLLOWUP:[2 weeks],ESTABLISHEDPATIENT:[T]] PROVIDER:[TOKEN:[[id number]],FOLLOWUP:[2 weeks],ESTABLISHEDPATIENT:[T]]

## 2023-12-19 NOTE — PROGRESS NOTE ADULT - ASSESSMENT
Patient is a 79 y/o F with pmhx of HTN, CAD, IVC filter placement, dementia (AAOx1 at baseline), DM, CKD, seizures, and recurrent UTI's who presented to the [facility] ED with reports of worsening C. diff refractory to Fidaxomicin, admitted for management.  Patient is a 79 y/o F with pmhx of HTN, CAD, IVC filter placement, dementia (AAOx1 at baseline), DM, CKD, seizures, and recurrent UTI's who presented to the [facility] ED with reports of worsening C. diff refractory to Fidaxomicin, admitted for management.

## 2023-12-19 NOTE — DISCHARGE NOTE PROVIDER - NSDCCPCAREPLAN_GEN_ALL_CORE_FT
PRINCIPAL DISCHARGE DIAGNOSIS  Diagnosis: Clostridium difficile infection  Assessment and Plan of Treatment: You were found to previously have C. Diff diarrhea. Inflammation of the colon caused by the bacteria Clostridium difficile. Clostridium difficile colitis results from disruption of normal healthy bacteria in the colon, often from antibiotics. C. difficile can also be transmitted from person to person by spores. It can cause severe damage to the colon and even be fatal. Symptoms include diarrhea, belly pain, and fever.Treatment includes antibiotics. Even when treated with antibiotics, the infection may come back. You are currently taking Fidoxamicin 200mg twice a day. Please continue taking this medication till 12/26. If you have any other symptoms or feel unwell, please return to the emergency department. Follow up with your primary care physician in 2 weeks.

## 2023-12-19 NOTE — PROGRESS NOTE ADULT - PROBLEM SELECTOR PLAN 10
F: s/p 1L NS bolus in the ED; maintenance fluids NS at 125cc/hr   E: replete as needed  N: pending bedside dysphagia   DVT ppx: hep subQ   GI ppx: pantoprazole 40mg qd    DISPO: [facility] F: s/p 1L NS bolus in the ED; maintenance fluids NS at 125cc/hr   E: replete as needed  N: pending bedside dysphagia   DVT ppx: hep subQ   GI ppx: pantoprazole 40mg qd    DISPO: [facility]

## 2023-12-19 NOTE — CONSULT NOTE ADULT - ATTENDING COMMENTS
Case d/w renal fellow during rounds.  Labs/notes appreciated.  ALE on CKD i/s/o diarrhea.  Improved Cr post IVF.  Agree with [place].  Pt to f/u with Dr. Hudson as outpt.

## 2023-12-19 NOTE — DISCHARGE NOTE PROVIDER - NSDCFUSCHEDAPPT_GEN_ALL_CORE_FT
Jason Hudson  [facility] Physician [facility]  [address] [facility] [address]  Scheduled Appointment: 01/04/2024     Jason Hudson  [facility] Physician [facility]  [address] [facility] [address]  Scheduled Appointment: 01/04/2024

## 2023-12-19 NOTE — PROGRESS NOTE ADULT - PROBLEM SELECTOR PLAN 2
Patient with Cr 2.82 on admission was 2.10 on 12/15/23 at OSH (per Epic records obtained via daughter). Patient with known CKD, has an upcoming appointment with Dr. Hudson to establish care for CKD. Etiologies of ALE likely pre-renal iso diarrhea for 3 weeks 2/2 C. diff. s/p 1L NS bolus  - c/w maintenance fluids NS at 125cc/hr   - trend Cr  - avoid nephrotoxic drugs

## 2023-12-19 NOTE — DISCHARGE NOTE NURSING/CASE MANAGEMENT/SOCIAL WORK - PATIENT PORTAL LINK FT
You can access the FollowMyHealth Patient Portal offered by [facility] by registering at the following website: [email or web address]. By joining [employer]’s FollowMyHealth portal, you will also be able to view your health information using other applications (apps) compatible with our system. You can access the FollowMyHealth Patient Portal offered by [facility] by registering at the following website: [email or web address]. By joining [employer]’s FollowMyHealth portal, you will also be able to view your health information using other applications (apps) compatible with our system.

## 2023-12-19 NOTE — PROGRESS NOTE ADULT - PROBLEM SELECTOR PLAN 1
As per daughter at bedside, patient's sx's started 3 weeks ago, she was seen at an OSH initially discharged on vancomycin, with readmission at OSH from 12/10-12/15 during which she was started on fidaxomicin and discharged with instructions to continue taking fidaxomicin 200mg BID for 11 more days (end date 12/26/23). As per daughter, patient was also discharged on keflex 250mg QID for 3 days for a recurrent UTI but states that she stopped giving her mother the Keflex due to worsening diarrhea. GI PCR negative.  - c/w fidaxomicin 200mg BID   - monitor BM's while on fidaxomicin  - f/u CTAP

## 2023-12-19 NOTE — PROGRESS NOTE ADULT - PROBLEM SELECTOR PLAN 9
Hgb 7.7 (Hgb 8.5 on 12/15/23 as per Epic records provided by daughter at bedside), MCV 96 likely AoCD iso known CKD. No signs of active bleeding (melena, hematochezia, hemoptysis, hematemesis)  - maintain active T&S  - trend Hgb   - transfuse for Hgb < 7

## 2023-12-19 NOTE — PROGRESS NOTE ADULT - SUBJECTIVE AND OBJECTIVE BOX
INTERVAL HPI/OVERNIGHT EVENTS:  As per night team, no overnight events. According to nurse, patient had one bowel movement overnight that was formed. Patient seen and examined at bedside. Patient denies fever, chills, dizziness, weakness, HA, CP, SOB, N/V/D/C    VITALS  Vital Signs Last 24 Hrs  T(C): 36.8 (19 Dec 2023 05:35), Max: 37.8 (18 Dec 2023 15:43)  T(F): 98.3 (19 Dec 2023 05:35), Max: 100.1 (18 Dec 2023 15:43)  HR: 63 (19 Dec 2023 05:35) (63 - 93)  BP: 151/78 (19 Dec 2023 05:35) (132/75 - 165/79)  BP(mean): 102 (18 Dec 2023 18:55) (102 - 102)  RR: 18 (19 Dec 2023 05:35) (16 - 18)  SpO2: 94% (19 Dec 2023 05:35) (94% - 99%)    Parameters below as of 19 Dec 2023 05:35  Patient On (Oxygen Delivery Method): room air    CAPILLARY BLOOD GLUCOSE  POCT Blood Glucose.: 101 mg/dL (19 Dec 2023 05:56)  POCT Blood Glucose.: 108 mg/dL (19 Dec 2023 00:08)    PHYSICAL EXAM  General: NAD, sitting comfortably in bed   HEENT: PERRL/ EOMI, no scleral icterus, MMM  Neck: Supple, no JVD  Respiratory: lungs CTA b/l, no wheezes/crackles, poor inspiration  Cardiovascular: Regular rhythm/rate; +S1 +S2, no murmurs  Gastrointestinal: Soft, NTND, hyperactive BS, no rebound, no guarding  Genitourinary: no suprapubic tenderness  Extremities: WWP, no cyanosis, no clubbing, no edema, pulses equal  Neurological: A&Ox1, no gross focal deficits, follows commands  Skin: Normal temperature, warm, dry    MEDICATIONS  (STANDING):  allopurinol 100 milliGRAM(s) Oral every 24 hours  aspirin  chewable 81 milliGRAM(s) Oral every 24 hours  atorvastatin 40 milliGRAM(s) Oral at bedtime  carboxymethylcellulose 0.5% (Preservative-Free) Ophthalmic Solution 1 Drop(s) Right EYE every 8 hours  cyanocobalamin 500 MICROGram(s) Oral every 24 hours  donepezil 10 milliGRAM(s) Oral at bedtime  fidaxomicin 200 milliGRAM(s) Oral two times a day  heparin   Injectable 5000 Unit(s) SubCutaneous every 8 hours  levETIRAcetam 500 milliGRAM(s) Oral every 12 hours  memantine 5 milliGRAM(s) Oral every 12 hours  metoprolol succinate ER 25 milliGRAM(s) Oral every 24 hours  pantoprazole    Tablet 40 milliGRAM(s) Oral every 12 hours  sodium bicarbonate 650 milliGRAM(s) Oral every 12 hours  sodium chloride 0.9%. 1000 milliLiter(s) (125 mL/Hr) IV Continuous <Continuous>  traZODone 50 milliGRAM(s) Oral at bedtime    No Known Allergies    LABS                        7.6    6.38  )-----------( 225      ( 18 Dec 2023 05:30 )             26.3     12-18    138  |  112<H>  |  26<H>  ----------------------------<  81  4.4   |  17<L>  |  2.59<H>    Ca    9.1      18 Dec 2023 05:30  Phos  2.8     12-18  Mg     1.9     12-18    TPro  5.8<L>  /  Alb  3.0<L>  /  TBili  0.3  /  DBili  x   /  AST  14  /  ALT  10  /  AlkPhos  122<H>  12-18    PT/INR - ( 18 Dec 2023 10:00 )   PT: 12.6 sec;   INR: 1.11          PTT - ( 18 Dec 2023 10:00 )  PTT:28.2 sec  Urinalysis Basic - ( 18 Dec 2023 05:30 )    Color: x / Appearance: x / SG: x / pH: x  Gluc: 81 mg/dL / Ketone: x  / Bili: x / Urobili: x   Blood: x / Protein: x / Nitrite: x   Leuk Esterase: x / RBC: x / WBC x   Sq Epi: x / Non Sq Epi: x / Bacteria: x      RADIOLOGY & ADDITIONAL TESTS: Reviewed

## 2023-12-19 NOTE — DISCHARGE NOTE PROVIDER - HOSPITAL COURSE
#Discharge: do not delete    Patient is 77 y/o F with pmhx of HTN, CAD, IVC filter placement, dementia (AAOx1 at baseline), DM, CKD, seizures, and recurrent UTI's who presented to the [facility] ED with reports of worsening C. diff refractory to Fidaxomicin, admitted for management of diarrhea.      Hospital course (by problem):   Clostridium difficile infection.   As per daughter at bedside, patient's sx's started 3 weeks ago, she was seen at an OSH initially discharged on vancomycin, with readmission at OSH from 12/10-12/15 during which she was started on fidaxomicin and discharged with instructions to continue taking fidaxomicin 200mg BID for 11 more days (end date 12/26/23). As per daughter, patient was also discharged on keflex 250mg QID for 3 days for a recurrent UTI but states that she stopped giving her mother the Keflex due to worsening diarrhea. No leucocytosis, afebrile, GI PCR negative. Patient no longer has any watery diarrhea. Patient with one formed stool overnight. Continue Fidaxomicin 200mg BID till 12/26.    Acute kidney injury superimposed on CKD.   Patient with Cr 2.82 on admission was 2.10 on 12/15/23 at OSH (per Epic records obtained via daughter). Patient with known CKD, has an upcoming appointment with Dr. Hudson to establish care for CKD. Etiologies of ALE likely pre-renal iso diarrhea for 3 weeks 2/2 C. diff. s/p 1L NS bolus. Patient's creatinine improved with IV fluids.     History of recurrent UTIs.   Patient with hx of recurrent UTI's, was recently discharged from OSH on Keflex 250mg QID for 3 days, however daughter states she stopped giving patient Keflex iso worsening C. diff diarrhea. Patient denies any urinary sx's but patient also demented. As per daughter, patient's HHA's constantly changer her adult diapers due to frequent urination. Patient asymptomatic.     Dementia.   Patient with known dementia, baseline AAOx1 to person as per daughter, has 24 hr HHA. Patient on donepezil 10mg qhs, memantine 5mg BID, and trazodone 50mg qhs. Continue with home medications.      Hypertension.   Patient on metoprolol succinate ER 25mg qd, BP on admission  /74--> 164/71. Continue with home medications.     Seizure.   Patient with seizures on keppra 500mg BID. Continue with home medications.     Hyperlipidemia.   Patient on atorvastatin 40mg qhs. Continue home medications.    CAD (coronary artery disease).   Patient on aspirin 81mg qd. Continue with home medications.    Anemia of chronic disease.   Hgb 7.7 (Hgb 8.5 on 12/15/23 as per Epic records provided by daughter at bedside), MCV 96 likely AoCD iso known CKD. No signs of active bleeding (melena, hematochezia, hemoptysis, hematemesis). Follow up with your outpatient provider.     Patient was discharged to: home    New medications: Fidaxomicin   Changes to old medications: None  Medications that were stopped: None      Physical exam at the time of discharge:  General: NAD, sitting comfortably in bed   HEENT: PERRL/ EOMI, no scleral icterus, MMM  Neck: Supple, no JVD  Respiratory: lungs CTA b/l, no wheezes/crackles, poor inspiration  Cardiovascular: Regular rhythm/rate; +S1 +S2, no murmurs  Gastrointestinal: Soft, NTND, hyperactive BS, no rebound, no guarding  Genitourinary: no suprapubic tenderness  Extremities: WWP, no cyanosis, no clubbing, no edema, pulses equal  Neurological: A&Ox1, no gross focal deficits, follows commands  Skin: Normal temperature, warm, dry     #Discharge: do not delete    Patient is 77 y/o F with pmhx of HTN, CAD, IVC filter placement, dementia (AAOx1 at baseline), DM, CKD, seizures, and recurrent UTI's who presented to the [facility] ED with reports of worsening C. diff refractory to Fidaxomicin, admitted for management of diarrhea.      Hospital course (by problem):   Clostridium difficile infection.   As per daughter at bedside, patient's sx's started 3 weeks ago, she was seen at an OSH initially discharged on vancomycin, with readmission at OSH from 12/10-12/15 during which she was started on fidaxomicin and discharged with instructions to continue taking fidaxomicin 200mg BID for 11 more days (end date 12/26/23). As per daughter, patient was also discharged on keflex 250mg QID for 3 days for a recurrent UTI but states that she stopped giving her mother the Keflex due to worsening diarrhea. No leucocytosis, afebrile, GI PCR negative. Patient no longer has any watery diarrhea. Patient with one formed stool overnight. Continue Fidaxomicin 200mg BID till 12/26.    Acute kidney injury superimposed on CKD.   Patient with Cr 2.82 on admission was 2.10 on 12/15/23 at OSH (per Epic records obtained via daughter). Patient with known CKD, has an upcoming appointment with Dr. Hudson to establish care for CKD. Etiologies of ALE likely pre-renal iso diarrhea for 3 weeks 2/2 C. diff. s/p 1L NS bolus. Patient's creatinine improved with IV fluids.     History of recurrent UTIs.   Patient with hx of recurrent UTI's, was recently discharged from OSH on Keflex 250mg QID for 3 days, however daughter states she stopped giving patient Keflex iso worsening C. diff diarrhea. Patient denies any urinary sx's but patient also demented. As per daughter, patient's HHA's constantly changer her adult diapers due to frequent urination. Patient asymptomatic.     Dementia.   Patient with known dementia, baseline AAOx1 to person as per daughter, has 24 hr HHA. Patient on donepezil 10mg qhs, memantine 5mg BID, and trazodone 50mg qhs. Continue with home medications.      Hypertension.   Patient on metoprolol succinate ER 25mg qd, BP on admission  /74--> 164/71. Continue with home medications.     Seizure.   Patient with seizures on keppra 500mg BID. Continue with home medications.     Hyperlipidemia.   Patient on atorvastatin 40mg qhs. Continue home medications.    CAD (coronary artery disease).   Patient on aspirin 81mg qd. Continue with home medications.    Anemia of chronic disease.   Hgb 7.7 (Hgb 8.5 on 12/15/23 as per Epic records provided by daughter at bedside), MCV 96 likely AoCD iso known CKD. No signs of active bleeding (melena, hematochezia, hemoptysis, hematemesis). Follow up with your outpatient provider.     Patient was discharged to: home    New medications: Fidaxomicin   Changes to old medications: None  Medications that were stopped: None      Physical exam at the time of discharge:  General: NAD, sitting comfortably in bed   HEENT: PERRL/ EOMI, no scleral icterus, MMM  Neck: Supple, no JVD  Respiratory: lungs CTA b/l, no wheezes/crackles, poor inspiration  Cardiovascular: Regular rhythm/rate; +S1 +S2, no murmurs  Gastrointestinal: Soft, NTND, hyperactive BS, no rebound, no guarding  Genitourinary: no suprapubic tenderness  Extremities: WWP, no cyanosis, no clubbing, no edema, pulses equal  Neurological: A&Ox1, no gross focal deficits, follows commands  Skin: Normal temperature, warm, dry     #Discharge: do not delete    Patient is 77 y/o F with pmhx of HTN, CAD, IVC filter placement, dementia (AAOx1 at baseline), DM, CKD, seizures, and recurrent UTI's who presented to the [facility] ED with reports of worsening C. diff refractory to Fidaxomicin, admitted for management of diarrhea.      Hospital course (by problem):   Clostridium difficile infection.   As per daughter at bedside, patient's sx's started 3 weeks ago, she was seen at an OSH initially discharged on vancomycin, with readmission at OSH from 12/10-12/15 during which she was started on fidaxomicin and discharged with instructions to continue taking fidaxomicin 200mg BID for 11 more days (end date 12/26/23). As per daughter, patient was also discharged on keflex 250mg QID for 3 days for a recurrent UTI but states that she stopped giving her mother the Keflex due to worsening diarrhea. No leucocytosis, afebrile, GI PCR negative. Patient no longer has any watery diarrhea. Patient with one formed stool overnight. Abdominal X-Ray clear. Continue Fidaxomicin 200mg BID till 12/26.    Acute kidney injury superimposed on CKD.   Patient with Cr 2.82 on admission was 2.10 on 12/15/23 at OSH (per Epic records obtained via daughter). Patient with known CKD, has an upcoming appointment with Dr. Hudson to establish care for CKD. Etiologies of ALE likely pre-renal iso diarrhea for 3 weeks 2/2 C. diff. s/p 1L NS bolus. Patient's creatinine improved with IV fluids.     History of recurrent UTIs.   Patient with hx of recurrent UTI's, was recently discharged from OSH on Keflex 250mg QID for 3 days, however daughter states she stopped giving patient Keflex iso worsening C. diff diarrhea. Patient denies any urinary sx's but patient also demented. As per daughter, patient's HHA's constantly changer her adult diapers due to frequent urination. Patient asymptomatic.     Dementia.   Patient with known dementia, baseline AAOx1 to person as per daughter, has 24 hr HHA. Patient on donepezil 10mg qhs, memantine 5mg BID, and trazodone 50mg qhs. Continue with home medications.      Hypertension.   Patient on metoprolol succinate ER 25mg qd, BP on admission  /74--> 164/71. Continue with home medications.     Seizure.   Patient with seizures on keppra 500mg BID. Continue with home medications.     Hyperlipidemia.   Patient on atorvastatin 40mg qhs. Continue home medications.    CAD (coronary artery disease).   Patient on aspirin 81mg qd. Continue with home medications.    Anemia of chronic disease.   Hgb 7.7 (Hgb 8.5 on 12/15/23 as per Epic records provided by daughter at bedside), MCV 96 likely AoCD iso known CKD. No signs of active bleeding (melena, hematochezia, hemoptysis, hematemesis). Follow up with your outpatient provider.     Patient was discharged to: home    New medications: Fidaxomicin   Changes to old medications: None  Medications that were stopped: None      Physical exam at the time of discharge:  General: NAD, sitting comfortably in bed   HEENT: PERRL/ EOMI, no scleral icterus, MMM  Neck: Supple, no JVD  Respiratory: lungs CTA b/l, no wheezes/crackles, poor inspiration  Cardiovascular: Regular rhythm/rate; +S1 +S2, no murmurs  Gastrointestinal: Soft, NTND, hyperactive BS, no rebound, no guarding  Genitourinary: no suprapubic tenderness  Extremities: WWP, no cyanosis, no clubbing, no edema, pulses equal  Neurological: A&Ox1, no gross focal deficits, follows commands  Skin: Normal temperature, warm, dry     #Discharge: do not delete    Patient is 77 y/o F with pmhx of HTN, CAD, IVC filter placement, dementia (AAOx1 at baseline), DM, CKD, seizures, and recurrent UTI's who presented to the [facility] ED with reports of worsening C. diff refractory to Fidaxomicin, admitted for management of diarrhea.      Hospital course (by problem):   Clostridium difficile infection.   As per daughter at bedside, patient's sx's started 3 weeks ago, she was seen at an OSH initially discharged on vancomycin, with readmission at OSH from 12/10-12/15 during which she was started on fidaxomicin and discharged with instructions to continue taking fidaxomicin 200mg BID for 11 more days (end date 12/26/23). As per daughter, patient was also discharged on keflex 250mg QID for 3 days for a recurrent UTI but states that she stopped giving her mother the Keflex due to worsening diarrhea. No leucocytosis, afebrile, GI PCR negative. Patient no longer has any watery diarrhea. Patient with one formed stool overnight. Abdominal X-Ray clear. Continue Fidaxomicin 200mg BID till 12/26.    Acute kidney injury superimposed on CKD.   Patient with Cr 2.82 on admission was 2.10 on 12/15/23 at OSH (per Epic records obtained via daughter). Patient with known CKD, has an upcoming appointment with Dr. Hudson to establish care for CKD. Etiologies of ALE likely pre-renal iso diarrhea for 3 weeks 2/2 C. diff. s/p 1L NS bolus. Patient's creatinine improved with IV fluids.     History of recurrent UTIs.   Patient with hx of recurrent UTI's, was recently discharged from OSH on Keflex 250mg QID for 3 days, however daughter states she stopped giving patient Keflex iso worsening C. diff diarrhea. Patient denies any urinary sx's but patient also demented. As per daughter, patient's HHA's constantly changer her adult diapers due to frequent urination. Patient asymptomatic.     Dementia.   Patient with known dementia, baseline AAOx1 to person as per daughter, has 24 hr HHA. Patient on donepezil 10mg qhs, memantine 5mg BID, and trazodone 50mg qhs. Continue with home medications.      Hypertension.   Patient on metoprolol succinate ER 25mg qd, BP on admission  /74--> 164/71. Continue with home medications.     Seizure.   Patient with seizures on keppra 500mg BID. Continue with home medications.     Hyperlipidemia.   Patient on atorvastatin 40mg qhs. Continue home medications.    CAD (coronary artery disease).   Patient on aspirin 81mg qd. Continue with home medications.    Anemia of chronic disease.   Hgb 7.7 (Hgb 8.5 on 12/15/23 as per Epic records provided by daughter at bedside), MCV 96 likely AoCD iso known CKD. No signs of active bleeding (melena, hematochezia, hemoptysis, hematemesis). Follow up with your outpatient provider.     Patient was discharged to: home    New medications: Fidaxomicin   Changes to old medications: None  Medications that were stopped: None      Physical exam at the time of discharge:  General: NAD, sitting comfortably in bed   HEENT: PERRL/ EOMI, no scleral icterus, MMM  Neck: Supple, no JVD  Respiratory: lungs CTA b/l, no wheezes/crackles, poor inspiration  Cardiovascular: Regular rhythm/rate; +S1 +S2, no murmurs  Gastrointestinal: Soft, NTND, hyperactive BS, no rebound, no guarding  Genitourinary: no suprapubic tenderness  Extremities: WWP, no cyanosis, no clubbing, no edema, pulses equal  Neurological: A&Ox1, no gross focal deficits, follows commands  Skin: Normal temperature, warm, dry     #Discharge: do not delete    Patient is 77 y/o F with pmhx of HTN, CAD, IVC filter placement, dementia (AAOx1 at baseline), DM, CKD, seizures, and recurrent UTI's who presented to the [facility] ED with reports of worsening C. diff refractory to Fidaxomicin, admitted for management of diarrhea.      Hospital course (by problem):   Clostridium difficile infection.   As per daughter at bedside, patient's sx's started 3 weeks ago, she was seen at an OSH initially discharged on vancomycin, with readmission at OSH from 12/10-12/15 during which she was started on fidaxomicin and discharged with instructions to continue taking fidaxomicin 200mg BID for 11 more days (end date 12/26/23). As per daughter, patient was also discharged on keflex 250mg QID for 3 days for a recurrent UTI but states that she stopped giving her mother the Keflex due to worsening diarrhea. No leucocytosis, afebrile, GI PCR negative. Patient no longer has any watery diarrhea. Patient with one formed stool overnight. Abdominal X-Ray clear. Continue Fidaxomicin 200mg BID till 12/26. Continue with bicarbonate BID.    Acute kidney injury superimposed on CKD.   Patient with Cr 2.82 on admission was 2.10 on 12/15/23 at OSH (per Epic records obtained via daughter). Patient with known CKD, has an upcoming appointment with Dr. Hudson to establish care for CKD. Etiologies of ALE likely pre-renal iso diarrhea for 3 weeks 2/2 C. diff. s/p 1L NS bolus. Patient's creatinine improved with IV fluids.     History of recurrent UTIs.   Patient with hx of recurrent UTI's, was recently discharged from OSH on Keflex 250mg QID for 3 days, however daughter states she stopped giving patient Keflex iso worsening C. diff diarrhea. Patient denies any urinary sx's but patient also demented. As per daughter, patient's HHA's constantly changer her adult diapers due to frequent urination. Patient asymptomatic.     Dementia.   Patient with known dementia, baseline AAOx1 to person as per daughter, has 24 hr HHA. Patient on donepezil 10mg qhs, memantine 5mg BID, and trazodone 50mg qhs. Continue with home medications.      Hypertension.   Patient on metoprolol succinate ER 25mg qd, BP on admission  /74--> 164/71. Continue with home medications.     Seizure.   Patient with seizures on keppra 500mg BID. Continue with home medications.     Hyperlipidemia.   Patient on atorvastatin 40mg qhs. Continue home medications.    CAD (coronary artery disease).   Patient on aspirin 81mg qd. Continue with home medications.    Anemia of chronic disease.   Hgb 7.7 (Hgb 8.5 on 12/15/23 as per Epic records provided by daughter at bedside), MCV 96 likely AoCD iso known CKD. No signs of active bleeding (melena, hematochezia, hemoptysis, hematemesis). Follow up with your outpatient provider.     Patient was discharged to: home    New medications: Fidaxomicin, bicarbonate  Changes to old medications: None  Medications that were stopped: None      Physical exam at the time of discharge:  General: NAD, sitting comfortably in bed   HEENT: PERRL/ EOMI, no scleral icterus, MMM  Neck: Supple, no JVD  Respiratory: lungs CTA b/l, no wheezes/crackles, poor inspiration  Cardiovascular: Regular rhythm/rate; +S1 +S2, no murmurs  Gastrointestinal: Soft, NTND, hyperactive BS, no rebound, no guarding  Genitourinary: no suprapubic tenderness  Extremities: WWP, no cyanosis, no clubbing, no edema, pulses equal  Neurological: A&Ox1, no gross focal deficits, follows commands  Skin: Normal temperature, warm, dry     #Discharge: do not delete    Patient is 79 y/o F with pmhx of HTN, CAD, IVC filter placement, dementia (AAOx1 at baseline), DM, CKD, seizures, and recurrent UTI's who presented to the [facility] ED with reports of worsening C. diff refractory to Fidaxomicin, admitted for management of diarrhea.      Hospital course (by problem):   Clostridium difficile infection.   As per daughter at bedside, patient's sx's started 3 weeks ago, she was seen at an OSH initially discharged on vancomycin, with readmission at OSH from 12/10-12/15 during which she was started on fidaxomicin and discharged with instructions to continue taking fidaxomicin 200mg BID for 11 more days (end date 12/26/23). As per daughter, patient was also discharged on keflex 250mg QID for 3 days for a recurrent UTI but states that she stopped giving her mother the Keflex due to worsening diarrhea. No leucocytosis, afebrile, GI PCR negative. Patient no longer has any watery diarrhea. Patient with one formed stool overnight. Abdominal X-Ray clear. Continue Fidaxomicin 200mg BID till 12/26. Continue with bicarbonate BID.    Acute kidney injury superimposed on CKD.   Patient with Cr 2.82 on admission was 2.10 on 12/15/23 at OSH (per Epic records obtained via daughter). Patient with known CKD, has an upcoming appointment with Dr. Hudson to establish care for CKD. Etiologies of ALE likely pre-renal iso diarrhea for 3 weeks 2/2 C. diff. s/p 1L NS bolus. Patient's creatinine improved with IV fluids.     History of recurrent UTIs.   Patient with hx of recurrent UTI's, was recently discharged from OSH on Keflex 250mg QID for 3 days, however daughter states she stopped giving patient Keflex iso worsening C. diff diarrhea. Patient denies any urinary sx's but patient also demented. As per daughter, patient's HHA's constantly changer her adult diapers due to frequent urination. Patient asymptomatic.     Dementia.   Patient with known dementia, baseline AAOx1 to person as per daughter, has 24 hr HHA. Patient on donepezil 10mg qhs, memantine 5mg BID, and trazodone 50mg qhs. Continue with home medications.      Hypertension.   Patient on metoprolol succinate ER 25mg qd, BP on admission  /74--> 164/71. Continue with home medications.     Seizure.   Patient with seizures on keppra 500mg BID. Continue with home medications.     Hyperlipidemia.   Patient on atorvastatin 40mg qhs. Continue home medications.    CAD (coronary artery disease).   Patient on aspirin 81mg qd. Continue with home medications.    Anemia of chronic disease.   Hgb 7.7 (Hgb 8.5 on 12/15/23 as per Epic records provided by daughter at bedside), MCV 96 likely AoCD iso known CKD. No signs of active bleeding (melena, hematochezia, hemoptysis, hematemesis). Follow up with your outpatient provider.     Patient was discharged to: home    New medications: Fidaxomicin, bicarbonate  Changes to old medications: None  Medications that were stopped: None      Physical exam at the time of discharge:  General: NAD, sitting comfortably in bed   HEENT: PERRL/ EOMI, no scleral icterus, MMM  Neck: Supple, no JVD  Respiratory: lungs CTA b/l, no wheezes/crackles, poor inspiration  Cardiovascular: Regular rhythm/rate; +S1 +S2, no murmurs  Gastrointestinal: Soft, NTND, hyperactive BS, no rebound, no guarding  Genitourinary: no suprapubic tenderness  Extremities: WWP, no cyanosis, no clubbing, no edema, pulses equal  Neurological: A&Ox1, no gross focal deficits, follows commands  Skin: Normal temperature, warm, dry

## 2023-12-23 LAB
CULTURE RESULTS: SIGNIFICANT CHANGE UP
SPECIMEN SOURCE: SIGNIFICANT CHANGE UP

## 2023-12-27 DIAGNOSIS — R19.7 DIARRHEA, UNSPECIFIED: ICD-10-CM

## 2023-12-27 DIAGNOSIS — D63.1 ANEMIA IN CHRONIC KIDNEY DISEASE: ICD-10-CM

## 2023-12-27 DIAGNOSIS — Z90.89 ACQUIRED ABSENCE OF OTHER ORGANS: ICD-10-CM

## 2023-12-27 DIAGNOSIS — R56.9 UNSPECIFIED CONVULSIONS: ICD-10-CM

## 2023-12-27 DIAGNOSIS — Z95.828 PRESENCE OF OTHER VASCULAR IMPLANTS AND GRAFTS: ICD-10-CM

## 2023-12-27 DIAGNOSIS — I25.10 ATHEROSCLEROTIC HEART DISEASE OF NATIVE CORONARY ARTERY WITHOUT ANGINA PECTORIS: ICD-10-CM

## 2023-12-27 DIAGNOSIS — Z11.52 ENCOUNTER FOR SCREENING FOR COVID-19: ICD-10-CM

## 2023-12-27 DIAGNOSIS — I12.9 HYPERTENSIVE CHRONIC KIDNEY DISEASE WITH STAGE 1 THROUGH STAGE 4 CHRONIC KIDNEY DISEASE, OR UNSPECIFIED CHRONIC KIDNEY DISEASE: ICD-10-CM

## 2023-12-27 DIAGNOSIS — Z79.82 LONG TERM (CURRENT) USE OF ASPIRIN: ICD-10-CM

## 2023-12-27 DIAGNOSIS — K21.9 GASTRO-ESOPHAGEAL REFLUX DISEASE WITHOUT ESOPHAGITIS: ICD-10-CM

## 2023-12-27 DIAGNOSIS — N17.9 ACUTE KIDNEY FAILURE, UNSPECIFIED: ICD-10-CM

## 2023-12-27 DIAGNOSIS — N18.9 CHRONIC KIDNEY DISEASE, UNSPECIFIED: ICD-10-CM

## 2023-12-27 DIAGNOSIS — F03.90 UNSPECIFIED DEMENTIA WITHOUT BEHAVIORAL DISTURBANCE: ICD-10-CM

## 2023-12-27 DIAGNOSIS — E86.0 DEHYDRATION: ICD-10-CM

## 2023-12-27 DIAGNOSIS — Z90.3 ACQUIRED ABSENCE OF STOMACH [PART OF]: ICD-10-CM

## 2023-12-27 DIAGNOSIS — E78.5 HYPERLIPIDEMIA, UNSPECIFIED: ICD-10-CM

## 2023-12-27 DIAGNOSIS — Z87.440 PERSONAL HISTORY OF URINARY (TRACT) INFECTIONS: ICD-10-CM

## 2023-12-27 DIAGNOSIS — A04.71 ENTEROCOLITIS DUE TO CLOSTRIDIUM DIFFICILE, RECURRENT: ICD-10-CM

## 2023-12-27 DIAGNOSIS — M10.9 GOUT, UNSPECIFIED: ICD-10-CM

## 2023-12-27 DIAGNOSIS — Z79.899 OTHER LONG TERM (CURRENT) DRUG THERAPY: ICD-10-CM

## 2024-01-04 ENCOUNTER — APPOINTMENT (OUTPATIENT)
Dept: NEPHROLOGY | Facility: CLINIC | Age: 79
End: 2024-01-04
Payer: MEDICARE

## 2024-01-04 ENCOUNTER — TRANSCRIPTION ENCOUNTER (OUTPATIENT)
Age: 79
End: 2024-01-04

## 2024-01-04 VITALS — RESPIRATION RATE: 14 BRPM | HEART RATE: 68 BPM | SYSTOLIC BLOOD PRESSURE: 100 MMHG | DIASTOLIC BLOOD PRESSURE: 78 MMHG

## 2024-01-04 DIAGNOSIS — A49.8 OTHER BACTERIAL INFECTIONS OF UNSPECIFIED SITE: ICD-10-CM

## 2024-01-04 DIAGNOSIS — I10 ESSENTIAL (PRIMARY) HYPERTENSION: ICD-10-CM

## 2024-01-04 DIAGNOSIS — Z99.3 DEPENDENCE ON WHEELCHAIR: ICD-10-CM

## 2024-01-04 DIAGNOSIS — N18.4 CHRONIC KIDNEY DISEASE, STAGE 4 (SEVERE): ICD-10-CM

## 2024-01-04 DIAGNOSIS — N39.0 URINARY TRACT INFECTION, SITE NOT SPECIFIED: ICD-10-CM

## 2024-01-04 DIAGNOSIS — E87.5 HYPERKALEMIA: ICD-10-CM

## 2024-01-04 PROCEDURE — G2211 COMPLEX E/M VISIT ADD ON: CPT

## 2024-01-04 PROCEDURE — 99205 OFFICE O/P NEW HI 60 MIN: CPT

## 2024-01-08 NOTE — ASSESSMENT
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 avoiding prophylactic abx given recurrent c diff, risk outweighs benefit  RTC in 2-3 months, continue labs and close followup with PCP

## 2024-01-08 NOTE — PHYSICAL EXAM
[General Appearance - Alert] : alert [General Appearance - In No Acute Distress] : in no acute distress [Sclera] : the sclera and conjunctiva were normal [PERRL With Normal Accommodation] : pupils were equal in size, round, and reactive to light [Extraocular Movements] : extraocular movements were intact [Outer Ear] : the ears and nose were normal in appearance [Oropharynx] : the oropharynx was normal [Neck Appearance] : the appearance of the neck was normal [Neck Cervical Mass (___cm)] : no neck mass was observed [Jugular Venous Distention Increased] : there was no jugular-venous distention [Respiration, Rhythm And Depth] : normal respiratory rhythm and effort [Exaggerated Use Of Accessory Muscles For Inspiration] : no accessory muscle use [Auscultation Breath Sounds / Voice Sounds] : lungs were clear to auscultation bilaterally [Heart Rate And Rhythm] : heart rate was normal and rhythm regular [Heart Sounds] : normal S1 and S2 [Heart Sounds Gallop] : no gallops [Murmurs] : no murmurs [Heart Sounds Pericardial Friction Rub] : no pericardial rub [Edema] : there was no peripheral edema [Veins - Varicosity Changes] : there were no varicosital changes [Bowel Sounds] : normal bowel sounds [Abdomen Soft] : soft [Abdomen Tenderness] : non-tender [Abdomen Mass (___ Cm)] : no abdominal mass palpated [No CVA Tenderness] : no ~M costovertebral angle tenderness [No Spinal Tenderness] : no spinal tenderness [Involuntary Movements] : no involuntary movements were seen [FreeTextEntry1] : In Wheelchair [Skin Color & Pigmentation] : normal skin color and pigmentation [Skin Turgor] : normal skin turgor [] : no rash [Cranial Nerves] : cranial nerves 2-12 were intact [No Focal Deficits] : no focal deficits

## 2024-01-08 NOTE — HISTORY OF PRESENT ILLNESS
[FreeTextEntry1] : Patient is a 77 yo F with CKD baseline likely 2.1, HTN, CAD, DM2, IVC fitler, dementia, recurrent UTIs, nephrolithiasis, refractory c diff who presents to establish care  CKD has been ongoing, repeat AKIs with infections and dehydration baseline   MEds:  allopurinol 100 mg oral tablet--1 tab(s) by mouth once a day cyanocobalamin 1000 mcg oral tablet--0.5 tab(s) by mouth once a day Dificid 200 mg oral tablet--1 tab(s) by mouth 2 times a dayIndication: Clostridium difficile infection donepezil 5 mg oral tablet--2 tab(s) by mouth once a day (at bedtime)Indication: Dementia Keppra 500 mg oral tablet--1 tab(s) by mouth 2 times a day Lipitor 10 mg oral tablet--1 tab(s) by mouth once a day (at bedtime)Indication: HLD (hyperlipidemia memantine 10 mg oral tablet--1 tab(s) by mouth once a da Metoprolol Succinate ER 25 mg oral tablet, extended release-- pantoprazole 40 mg oral delayed release tablet--1 tab(s sodium bicarbonate 650 mg oral tablet--1 tab(s) by mouth every 12 hours traZODone 50 mg oral tablet  [phone] - Dr Resendiz, discussed, on procrit  Nephrologic History: Stones: Once, had ithotripsy NSAID use: None HTN: At goal on meds DM: at goal on oral meds Prior nephrologist: Unknown but had many years ago Kidney biopsy: None Reduced kidney mass (prematurity): Not Pre-eclampsia: Unclear but took a while to concieve Urination history: Incontinent, difficult to tell  Family Hx: No first degree relatives with kidney disease Surgical Hx: Appendicitis Social Hx: Former smoker, stopped 65 Allergies: NKDA Meds:  As above  Increase sodium bicarbonate to 2 pills THREE times a day - prior to meals, may help with increasing intake as well.

## 2024-09-16 NOTE — ED ADULT TRIAGE NOTE - AS O2 DELIVERY
-Increase fluids and rest.  -Cool-mist humidifier for nighttime use.   -Practice good hand hygiene.  -Zinc 75 mg daily has been shown to be effective in reducing the duration of cold symptoms.   -You may use either acetaminophen or ibuprofen over-the-counter every 6-8 hours for pain or fever if that is not contraindicated with your body.    -Saline Nasal Spray and Flonase may be used for congestion. Nasal saline in the nose helps to help break up nasal secretions, and is beneficial for nasal symptoms and throat pain.  -Saline Nasal Rinse with a Neti pot or Manpreet med rinse can be used multiple times a day. Be sure to use distilled water or boil tap water for 10 mins. Add a saline packet. Be sure the water is not too hot (around your body temp of 98 degrees)  -Decongestants are not recommended as they can have a rebound congestion effect along with many side effects (especially if you have high blood pressure).   -Chloraseptic lozenge, warm tea with honey or  throat spray to help with discomfort.  -Salt water gargles for sore throat several times a day.      room air

## 2025-01-06 NOTE — DISCHARGE NOTE PROVIDER - NSDCADMDATE_GEN_ALL_CORE_FT
[FreeTextEntry1] : Documented by Melissa Ricks acting as a scribe for Dr. Mcarthur and Dexter Burnett PA-C on 01/06/2025. All medical record entries made by the Scribe were at my, Dr. Mcarthur, and Dexter Burnett's, direction and personally dictated by me on 01/06/2025. I have reviewed the chart and agree that the record accurately reflects my personal performance of the history, physical exam, procedure and imaging.  18-Dec-2023 01:15

## 2025-02-04 NOTE — H&P ADULT - NSICDXFAMILYHX_GEN_ALL_CORE_FT
----- Message from Joceline sent at 2/4/2025  8:37 AM CST -----  Patient is requesting a call back at [phone]   FAMILY HISTORY:  Mother  Still living? Unknown  Family history of diabetes mellitus, Age at diagnosis: Age Unknown  Family history of hypertension, Age at diagnosis: Age Unknown

## 2025-02-27 NOTE — ED PROVIDER NOTE - NS ED MD DISPO DIVISION
Patient presents to ED due to multiple c/o     States having HA for the past 2-3 weeks . Non compliant with BP medication, reports stopped taking approx 3 months ago.     Seen at [facility] today     Now c/o blurry vision on and off .     RUQ pain started 2 days ago . Denies n/v/d     A/ox 4     ABC intact    Render In Strict Bullet Format?: No Detail Level: Zone Initiate Treatment: Klisyri 1 % topical ointment [facility]
